# Patient Record
Sex: FEMALE | Race: WHITE | Employment: OTHER | URBAN - METROPOLITAN AREA
[De-identification: names, ages, dates, MRNs, and addresses within clinical notes are randomized per-mention and may not be internally consistent; named-entity substitution may affect disease eponyms.]

---

## 2017-01-01 ENCOUNTER — HOSPITAL ENCOUNTER (OUTPATIENT)
Dept: ONCOLOGY | Age: 57
Discharge: HOME | End: 2017-01-31
Attending: INTERNAL MEDICINE | Admitting: INTERNAL MEDICINE

## 2017-02-01 ENCOUNTER — HOSPITAL ENCOUNTER (OUTPATIENT)
Dept: ONCOLOGY | Age: 57
Discharge: HOME | End: 2017-02-28
Attending: INTERNAL MEDICINE | Admitting: INTERNAL MEDICINE

## 2017-04-03 DIAGNOSIS — M25.512 ACUTE PAIN OF LEFT SHOULDER: ICD-10-CM

## 2017-04-03 RX ORDER — METHOCARBAMOL 500 MG/1
TABLET, FILM COATED ORAL
Qty: 60 TABLET | Refills: 0 | Status: SHIPPED | OUTPATIENT
Start: 2017-04-03 | End: 2017-05-24 | Stop reason: SDUPTHER

## 2017-04-20 ENCOUNTER — HOSPITAL ENCOUNTER (OUTPATIENT)
Dept: ONCOLOGY | Age: 57
Discharge: HOME | End: 2017-04-20
Attending: INTERNAL MEDICINE | Admitting: INTERNAL MEDICINE

## 2017-05-18 ENCOUNTER — HOSPITAL ENCOUNTER (OUTPATIENT)
Dept: ONCOLOGY | Age: 57
Discharge: HOME | End: 2017-05-31
Admitting: INTERNAL MEDICINE

## 2017-05-24 DIAGNOSIS — M25.512 ACUTE PAIN OF LEFT SHOULDER: ICD-10-CM

## 2017-05-25 RX ORDER — METHOCARBAMOL 500 MG/1
TABLET, FILM COATED ORAL
Qty: 60 TABLET | Refills: 0 | Status: ON HOLD | OUTPATIENT
Start: 2017-05-25 | End: 2017-07-17 | Stop reason: HOSPADM

## 2017-06-01 ENCOUNTER — HOSPITAL ENCOUNTER (OUTPATIENT)
Dept: ONCOLOGY | Age: 57
Discharge: HOME | End: 2017-06-30
Attending: INTERNAL MEDICINE | Admitting: INTERNAL MEDICINE

## 2017-07-04 PROBLEM — E11.10 DKA (DIABETIC KETOACIDOSES): Status: ACTIVE | Noted: 2017-07-04

## 2017-07-04 PROBLEM — N17.9 AKI (ACUTE KIDNEY INJURY) (HCC): Status: ACTIVE | Noted: 2017-07-04

## 2017-07-05 PROBLEM — I21.4 NSTEMI (NON-ST ELEVATED MYOCARDIAL INFARCTION) (HCC): Status: ACTIVE | Noted: 2017-07-05

## 2017-07-24 ENCOUNTER — CLINICAL DOCUMENTATION (OUTPATIENT)
Dept: SPIRITUAL SERVICES | Age: 57
End: 2017-07-24

## 2017-07-27 ENCOUNTER — CLINICAL DOCUMENTATION (OUTPATIENT)
Dept: SPIRITUAL SERVICES | Age: 57
End: 2017-07-27

## 2017-08-10 ENCOUNTER — CLINICAL DOCUMENTATION (OUTPATIENT)
Dept: SPIRITUAL SERVICES | Age: 57
End: 2017-08-10

## 2017-08-15 ENCOUNTER — HOSPITAL ENCOUNTER (OUTPATIENT)
Dept: OTHER | Age: 57
Discharge: HOME | End: 2017-08-15
Attending: INTERNAL MEDICINE | Admitting: INTERNAL MEDICINE

## 2017-08-23 ENCOUNTER — CLINICAL DOCUMENTATION (OUTPATIENT)
Dept: SPIRITUAL SERVICES | Age: 57
End: 2017-08-23

## 2017-09-01 ENCOUNTER — HOSPITAL ENCOUNTER (OUTPATIENT)
Dept: ONCOLOGY | Age: 57
Discharge: HOME | End: 2017-09-30
Attending: INTERNAL MEDICINE | Admitting: INTERNAL MEDICINE

## 2017-10-01 ENCOUNTER — HOSPITAL ENCOUNTER (OUTPATIENT)
Dept: ONCOLOGY | Age: 57
Discharge: HOME | End: 2017-10-31
Attending: INTERNAL MEDICINE | Admitting: INTERNAL MEDICINE

## 2017-10-16 ENCOUNTER — CLINICAL DOCUMENTATION (OUTPATIENT)
Dept: SPIRITUAL SERVICES | Age: 57
End: 2017-10-16

## 2017-10-16 NOTE — PROGRESS NOTES
10/16/2017  11:09am/1109      Outpatient SCS team member telephoned patient. Patient spoke very briefly with team member and updated team member on recent health concerns. Patient said that family is helping her currently. Patient indicated that she will call Outpatient SCS if needed and would like to be removed from contact list at this time.       Patient moved to Inactive status 10/16/2017

## 2017-10-31 ENCOUNTER — OFFICE VISIT (OUTPATIENT)
Dept: ORTHOPEDIC SURGERY | Age: 57
End: 2017-10-31

## 2017-10-31 VITALS — HEIGHT: 64 IN | BODY MASS INDEX: 50.02 KG/M2 | WEIGHT: 293 LBS

## 2017-10-31 DIAGNOSIS — G89.29 BILATERAL CHRONIC KNEE PAIN: Primary | ICD-10-CM

## 2017-10-31 DIAGNOSIS — M25.561 BILATERAL CHRONIC KNEE PAIN: Primary | ICD-10-CM

## 2017-10-31 DIAGNOSIS — S80.00XA CONTUSION OF KNEE, UNSPECIFIED LATERALITY, INITIAL ENCOUNTER: ICD-10-CM

## 2017-10-31 DIAGNOSIS — M54.2 CERVICAL PAIN: ICD-10-CM

## 2017-10-31 DIAGNOSIS — M25.562 BILATERAL CHRONIC KNEE PAIN: Primary | ICD-10-CM

## 2017-10-31 PROCEDURE — G8484 FLU IMMUNIZE NO ADMIN: HCPCS | Performed by: ORTHOPAEDIC SURGERY

## 2017-10-31 PROCEDURE — 72040 X-RAY EXAM NECK SPINE 2-3 VW: CPT | Performed by: ORTHOPAEDIC SURGERY

## 2017-10-31 PROCEDURE — 99213 OFFICE O/P EST LOW 20 MIN: CPT | Performed by: ORTHOPAEDIC SURGERY

## 2017-10-31 PROCEDURE — G8427 DOCREV CUR MEDS BY ELIG CLIN: HCPCS | Performed by: ORTHOPAEDIC SURGERY

## 2017-10-31 PROCEDURE — G8598 ASA/ANTIPLAT THER USED: HCPCS | Performed by: ORTHOPAEDIC SURGERY

## 2017-10-31 PROCEDURE — 73565 X-RAY EXAM OF KNEES: CPT | Performed by: ORTHOPAEDIC SURGERY

## 2017-10-31 PROCEDURE — 3017F COLORECTAL CA SCREEN DOC REV: CPT | Performed by: ORTHOPAEDIC SURGERY

## 2017-10-31 PROCEDURE — 3014F SCREEN MAMMO DOC REV: CPT | Performed by: ORTHOPAEDIC SURGERY

## 2017-10-31 PROCEDURE — 1036F TOBACCO NON-USER: CPT | Performed by: ORTHOPAEDIC SURGERY

## 2017-10-31 PROCEDURE — G8417 CALC BMI ABV UP PARAM F/U: HCPCS | Performed by: ORTHOPAEDIC SURGERY

## 2017-10-31 RX ORDER — METHOCARBAMOL 500 MG/1
500 TABLET, FILM COATED ORAL 2 TIMES DAILY
Qty: 90 TABLET | Refills: 4 | Status: SHIPPED | OUTPATIENT
Start: 2017-10-31 | End: 2018-06-13

## 2017-10-31 NOTE — PATIENT INSTRUCTIONS
your ankle (not more than 5 pounds). With weight, you do not have to lift your leg more than 12 inches to get a hamstring workout. Shallow standing knee bends    Note: Do this exercise only if you have very little pain; if you have no clicking, locking, or giving way if you have an injured knee; and if it does not hurt while you are doing 8 to 12 repetitions. 1. Stand with your hands lightly resting on a counter or chair in front of you. Put your feet shoulder-width apart. 2. Slowly bend your knees so that you squat down like you are going to sit in a chair. Make sure your knees do not go in front of your toes. 3. Lower yourself about 6 inches. Your heels should remain on the floor at all times. 4. Rise slowly to a standing position. Heel raises    1. Stand with your feet a few inches apart, with your hands lightly resting on a counter or chair in front of you. 2. Slowly raise your heels off the floor while keeping your knees straight. 3. Hold for about 6 seconds, then slowly lower your heels to the floor. 4. Do 8 to 12 repetitions several times during the day. Follow-up care is a key part of your treatment and safety. Be sure to make and go to all appointments, and call your doctor if you are having problems. It's also a good idea to know your test results and keep a list of the medicines you take. Where can you learn more? Go to [email or web address] org and sign in to your [employer] account. Enter V231 in the [employer] box to learn more about \"Knee: Exercises. \"     If you do not have an account, please click on the \"Sign Up Now\" link. Current as of: March 21, 2017  Content Version: 11.3  © 3343-4048 [employer]. Care instructions adapted under license by [facility] [employer] [facility] ([facility]).  If you have questions about a medical condition or this instruction, always ask your healthcare professional. [place] [address] any warranty or liability for your use of this information.

## 2017-10-31 NOTE — PROGRESS NOTES
KNEE VISIT      HISTORY OF PRESENT ILLNESS    Ricky Rodriguez is a [address] y.o. female who presents for evaluation of bilateral left worse right knee contusion and pain as well as neck pain. She's fallen several times recently and is here for complaints of pain in both areas. The left knee hurts worse than right. She grades 6-7/10. She has some pain in her neck. ROS    All documented in the patient history form dated 10/31/17  All other ROS negative except for above.     Past Surgical history    Past Surgical History:   Procedure Laterality Date    ABDOMINAL EXPLORATION SURGERY  13    EXPLORATORY LAPAROTOMY LYSIS OF ADHESIONS    APPENDECTOMY      CARPAL TUNNEL RELEASE      both hands    CATARACT REMOVAL WITH IMPLANT  11    right    CATARACT REMOVAL WITH IMPLANT  2011    left eye     SECTION      COLONOSCOPY      EYE SURGERY      laser, vitrectomy ou, cataract ou    HYSTERECTOMY      partial    JOINT REPLACEMENT      both knees    OTHER SURGICAL HISTORY  8/15/11    Left Shoulder Decompression    SHOULDER SURGERY      right     SUBTOTAL COLECTOMY  2000    TONSILLECTOMY      TUNNELED VENOUS PORT PLACEMENT  11    TUNNELED VENOUS PORT PLACEMENT  3/1/13    Devonte Bustillos       PAST MEDICAL    Past Medical History:   Diagnosis Date    Acid reflux     Acute renal failure ([address] 2011    resolved    Anemia     Arthritis     causing severe lower back pains    Asthma     Bronchitis     Bursitis     all joints    C. difficile colitis     Diabetes mellitus ([address]     age 4   [employer] Herpes     5    Hypercholesteremia     Hypertension     Migraine     Neuropathy (HCC)     hips to legs    Obesity     Psychiatric problem     anxiety and depression    Recurrent sinus infections     Renal failure     in past with 8 dialysis treatments    Retinopathy of both eyes     Sleep apnea     can't use a cpap    Ulcer, stomach peptic     x 3    Unspecified symmetric sensation motor function both upper extremities. She is tearful and this is fairly typical for her presentation because of issues surrounding her disabilities. IMAGING STUDIES    X-rays 2 views of both knees reveals well-positioned total knee components with no signs of failure  X-rays 2 views of her cervical spine are normal in appearance    IMPRESSION    Cervical strain  Bilateral knee contusion status post total knee replacement    PLAN      1. Conservative care options including physical therapy, NSAIDs, bracing, and activity modification were discussed. 2.  The indications for therapeutic injections were discussed. 3.  The indications for additional imaging studies were discussed.    4.  After considering the various options discussed, the patient elected to pursue a course that includes Robaxin and slow return to normal activity

## 2017-11-01 ENCOUNTER — HOSPITAL ENCOUNTER (OUTPATIENT)
Dept: ONCOLOGY | Age: 57
Discharge: HOME | End: 2017-11-30
Attending: INTERNAL MEDICINE | Admitting: INTERNAL MEDICINE

## 2017-12-04 ENCOUNTER — HOSPITAL ENCOUNTER (OUTPATIENT)
Dept: OTHER | Age: 57
Discharge: HOME | End: 2017-12-31
Attending: INTERNAL MEDICINE | Admitting: INTERNAL MEDICINE

## 2017-12-04 LAB
ANION GAP SERPL CALCULATED.3IONS-SCNC: 13 MMOL/L (ref 3–16)
BUN BLDV-MCNC: 36 MG/DL (ref 7–20)
CALCIUM SERPL-MCNC: 9 MG/DL (ref 8.3–10.6)
CHLORIDE BLD-SCNC: 103 MMOL/L (ref 99–110)
CO2: 23 MMOL/L (ref 21–32)
CREAT SERPL-MCNC: 1.4 MG/DL (ref 0.6–1.1)
CREATININE URINE: 108.5 MG/DL (ref 28–259)
GFR AFRICAN AMERICAN: 47
GFR NON-AFRICAN AMERICAN: 39
GLUCOSE BLD-MCNC: 174 MG/DL (ref 70–99)
HCT VFR BLD CALC: 31.9 % (ref 36–48)
HEMOGLOBIN: 10.6 G/DL (ref 12–16)
MAGNESIUM: 1.6 MG/DL (ref 1.8–2.4)
MCH RBC QN AUTO: 31.8 PG (ref 26–34)
MCHC RBC AUTO-ENTMCNC: 33.4 G/DL (ref 31–36)
MCV RBC AUTO: 95.2 FL (ref 80–100)
PDW BLD-RTO: 13.5 % (ref 12.4–15.4)
PHOSPHORUS: 3.9 MG/DL (ref 2.5–4.9)
PLATELET # BLD: 220 K/UL (ref 135–450)
PMV BLD AUTO: 10 FL (ref 5–10.5)
POTASSIUM SERPL-SCNC: 4.8 MMOL/L (ref 3.5–5.1)
PROTEIN PROTEIN: 16 MG/DL
RBC # BLD: 3.35 M/UL (ref 4–5.2)
SODIUM BLD-SCNC: 139 MMOL/L (ref 136–145)
WBC # BLD: 6 K/UL (ref 4–11)

## 2017-12-05 LAB — PARATHYROID HORMONE INTACT: 69 PG/ML (ref 14–72)

## 2018-01-01 ENCOUNTER — HOSPITAL ENCOUNTER (OUTPATIENT)
Dept: ONCOLOGY | Age: 58
Discharge: HOME | End: 2018-01-31
Attending: INTERNAL MEDICINE | Admitting: INTERNAL MEDICINE

## 2018-04-05 ENCOUNTER — HOSPITAL ENCOUNTER (OUTPATIENT)
Dept: OTHER | Age: 58
Discharge: HOME | End: 2018-04-05
Attending: INTERNAL MEDICINE | Admitting: INTERNAL MEDICINE

## 2018-04-05 ENCOUNTER — HOSPITAL ENCOUNTER (OUTPATIENT)
Dept: ONCOLOGY | Age: 58
Discharge: HOME | End: 2018-04-30
Admitting: EMERGENCY MEDICINE

## 2018-04-05 LAB
A/G RATIO: 1.4 (ref 1.1–2.2)
ALBUMIN SERPL-MCNC: 3.7 G/DL (ref 3.4–5)
ALP BLD-CCNC: 69 U/L (ref 40–129)
ALT SERPL-CCNC: 6 U/L (ref 10–40)
ANION GAP SERPL CALCULATED.3IONS-SCNC: 13 MMOL/L (ref 3–16)
ANION GAP SERPL CALCULATED.3IONS-SCNC: 14 MMOL/L (ref 3–16)
AST SERPL-CCNC: 10 U/L (ref 15–37)
BILIRUB SERPL-MCNC: 0.3 MG/DL (ref 0–1)
BUN BLDV-MCNC: 26 MG/DL (ref 7–20)
BUN BLDV-MCNC: 26 MG/DL (ref 7–20)
CALCIUM SERPL-MCNC: 8.7 MG/DL (ref 8.3–10.6)
CALCIUM SERPL-MCNC: 8.9 MG/DL (ref 8.3–10.6)
CHLORIDE BLD-SCNC: 97 MMOL/L (ref 99–110)
CHLORIDE BLD-SCNC: 98 MMOL/L (ref 99–110)
CHOLESTEROL, TOTAL: 166 MG/DL (ref 0–199)
CO2: 23 MMOL/L (ref 21–32)
CO2: 24 MMOL/L (ref 21–32)
CREAT SERPL-MCNC: 1.5 MG/DL (ref 0.6–1.1)
CREAT SERPL-MCNC: 1.5 MG/DL (ref 0.6–1.1)
CREATININE URINE: 57 MG/DL (ref 28–259)
FERRITIN: 90.9 NG/ML (ref 15–150)
GFR AFRICAN AMERICAN: 43
GFR AFRICAN AMERICAN: 43
GFR NON-AFRICAN AMERICAN: 36
GFR NON-AFRICAN AMERICAN: 36
GLOBULIN: 2.6 G/DL
GLUCOSE BLD-MCNC: 229 MG/DL (ref 70–99)
GLUCOSE BLD-MCNC: 242 MG/DL (ref 70–99)
HCT VFR BLD CALC: 31.8 % (ref 36–48)
HDLC SERPL-MCNC: 60 MG/DL (ref 40–60)
HEMOGLOBIN: 10.6 G/DL (ref 12–16)
IRON SATURATION: 36 % (ref 15–50)
IRON: 108 UG/DL (ref 37–145)
LDL CHOLESTEROL CALCULATED: 87 MG/DL
MAGNESIUM: 1.7 MG/DL (ref 1.8–2.4)
MCH RBC QN AUTO: 32.1 PG (ref 26–34)
MCHC RBC AUTO-ENTMCNC: 33.3 G/DL (ref 31–36)
MCV RBC AUTO: 96.4 FL (ref 80–100)
MICROALBUMIN UR-MCNC: <1.2 MG/DL
MICROALBUMIN/CREAT UR-RTO: NORMAL MG/G (ref 0–30)
PDW BLD-RTO: 13.1 % (ref 12.4–15.4)
PHOSPHORUS: 3.9 MG/DL (ref 2.5–4.9)
PLATELET # BLD: 196 K/UL (ref 135–450)
PMV BLD AUTO: 9.9 FL (ref 5–10.5)
POTASSIUM SERPL-SCNC: 4.9 MMOL/L (ref 3.5–5.1)
POTASSIUM SERPL-SCNC: 5 MMOL/L (ref 3.5–5.1)
RBC # BLD: 3.3 M/UL (ref 4–5.2)
SODIUM BLD-SCNC: 134 MMOL/L (ref 136–145)
SODIUM BLD-SCNC: 135 MMOL/L (ref 136–145)
TOTAL CK: 77 U/L (ref 26–192)
TOTAL IRON BINDING CAPACITY: 300 UG/DL (ref 260–445)
TOTAL PROTEIN: 6.3 G/DL (ref 6.4–8.2)
TRIGL SERPL-MCNC: 95 MG/DL (ref 0–150)
VITAMIN B-12: 1479 PG/ML (ref 211–911)
VLDLC SERPL CALC-MCNC: 19 MG/DL
WBC # BLD: 6 K/UL (ref 4–11)

## 2018-04-07 LAB
ESTIMATED AVERAGE GLUCOSE: 165.7 MG/DL
HBA1C MFR BLD: 7.4 %

## 2018-05-01 ENCOUNTER — HOSPITAL ENCOUNTER (OUTPATIENT)
Dept: ONCOLOGY | Age: 58
Discharge: HOME | End: 2018-05-31
Attending: EMERGENCY MEDICINE | Admitting: EMERGENCY MEDICINE

## 2018-11-12 ENCOUNTER — HOSPITAL ENCOUNTER (EMERGENCY)
Age: 58
Discharge: HOME | End: 2018-11-12
Attending: EMERGENCY MEDICINE
Payer: COMMERCIAL

## 2018-11-12 ENCOUNTER — APPOINTMENT (OUTPATIENT)
Dept: CT IMAGING | Age: 58
End: 2018-11-12
Payer: COMMERCIAL

## 2018-11-12 ENCOUNTER — APPOINTMENT (OUTPATIENT)
Dept: GENERAL RADIOLOGY | Age: 58
End: 2018-11-12
Payer: COMMERCIAL

## 2018-11-12 ENCOUNTER — TELEPHONE (OUTPATIENT)
Dept: ORTHOPEDIC SURGERY | Age: 58
End: 2018-11-12

## 2018-11-12 VITALS
WEIGHT: 293 LBS | SYSTOLIC BLOOD PRESSURE: 156 MMHG | OXYGEN SATURATION: 97 % | BODY MASS INDEX: 50.02 KG/M2 | DIASTOLIC BLOOD PRESSURE: 63 MMHG | RESPIRATION RATE: 16 BRPM | TEMPERATURE: 98 F | HEIGHT: 64 IN | HEART RATE: 93 BPM

## 2018-11-12 DIAGNOSIS — S00.81XA ABRASION OF FACE, INITIAL ENCOUNTER: ICD-10-CM
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
## 2018-11-12 DIAGNOSIS — M25.531 RIGHT WRIST PAIN: ICD-10-CM

## 2018-11-12 DIAGNOSIS — M25.552 LEFT HIP PAIN: ICD-10-CM

## 2018-11-12 DIAGNOSIS — S09.90XA INJURY OF HEAD, INITIAL ENCOUNTER: ICD-10-CM

## 2018-11-12 DIAGNOSIS — S16.1XXA ACUTE STRAIN OF NECK MUSCLE, INITIAL ENCOUNTER: ICD-10-CM

## 2018-11-12 DIAGNOSIS — S82.892A CLOSED AVULSION FRACTURE OF LEFT ANKLE, INITIAL ENCOUNTER: Primary | ICD-10-CM

## 2018-11-12 PROCEDURE — 73700 CT LOWER EXTREMITY W/O DYE: CPT

## 2018-11-12 PROCEDURE — 96376 TX/PRO/DX INJ SAME DRUG ADON: CPT

## 2018-11-12 PROCEDURE — 72125 CT NECK SPINE W/O DYE: CPT

## 2018-11-12 PROCEDURE — G8979 MOBILITY GOAL STATUS: HCPCS

## 2018-11-12 PROCEDURE — G8988 SELF CARE GOAL STATUS: HCPCS

## 2018-11-12 PROCEDURE — 73610 X-RAY EXAM OF ANKLE: CPT

## 2018-11-12 PROCEDURE — 70450 CT HEAD/BRAIN W/O DYE: CPT

## 2018-11-12 PROCEDURE — 96375 TX/PRO/DX INJ NEW DRUG ADDON: CPT

## 2018-11-12 PROCEDURE — 99284 EMERGENCY DEPT VISIT MOD MDM: CPT

## 2018-11-12 PROCEDURE — 97530 THERAPEUTIC ACTIVITIES: CPT

## 2018-11-12 PROCEDURE — G8987 SELF CARE CURRENT STATUS: HCPCS

## 2018-11-12 PROCEDURE — 72131 CT LUMBAR SPINE W/O DYE: CPT

## 2018-11-12 PROCEDURE — 97162 PT EVAL MOD COMPLEX 30 MIN: CPT

## 2018-11-12 PROCEDURE — 96374 THER/PROPH/DIAG INJ IV PUSH: CPT

## 2018-11-12 PROCEDURE — 73130 X-RAY EXAM OF HAND: CPT

## 2018-11-12 PROCEDURE — G8978 MOBILITY CURRENT STATUS: HCPCS

## 2018-11-12 PROCEDURE — 97166 OT EVAL MOD COMPLEX 45 MIN: CPT

## 2018-11-12 PROCEDURE — 6360000002 HC RX W HCPCS: Performed by: NURSE PRACTITIONER

## 2018-11-12 PROCEDURE — 4500000024 HC ED LEVEL 4 PROCEDURE

## 2018-11-12 RX ORDER — OXYCODONE HYDROCHLORIDE AND ACETAMINOPHEN 5; 325 MG/1; MG/1
1-2 TABLET ORAL EVERY 6 HOURS PRN
Qty: 20 TABLET | Refills: 0 | Status: SHIPPED | OUTPATIENT
Start: 2018-11-12 | End: 2018-11-19

## 2018-11-12 RX ORDER — ONDANSETRON 2 MG/ML
4 INJECTION INTRAMUSCULAR; INTRAVENOUS EVERY 30 MIN PRN
Status: COMPLETED | OUTPATIENT
Start: 2018-11-12 | End: 2018-11-12

## 2018-11-12 RX ORDER — ORPHENADRINE CITRATE 30 MG/ML
60 INJECTION INTRAMUSCULAR; INTRAVENOUS ONCE
Status: COMPLETED | OUTPATIENT
Start: 2018-11-12 | End: 2018-11-12

## 2018-11-12 RX ADMIN — ONDANSETRON 4 MG: 2 INJECTION INTRAMUSCULAR; INTRAVENOUS at 13:18

## 2018-11-12 RX ADMIN — ORPHENADRINE CITRATE 60 MG: 30 INJECTION INTRAMUSCULAR; INTRAVENOUS at 11:28

## 2018-11-12 RX ADMIN — ONDANSETRON 4 MG: 2 INJECTION INTRAMUSCULAR; INTRAVENOUS at 11:28

## 2018-11-12 RX ADMIN — HYDROMORPHONE HYDROCHLORIDE 1 MG: 1 INJECTION, SOLUTION INTRAMUSCULAR; INTRAVENOUS; SUBCUTANEOUS at 13:28

## 2018-11-12 RX ADMIN — HYDROMORPHONE HYDROCHLORIDE 1 MG: 1 INJECTION, SOLUTION INTRAMUSCULAR; INTRAVENOUS; SUBCUTANEOUS at 11:27

## 2018-11-12 RX ADMIN — HYDROMORPHONE HYDROCHLORIDE 1 MG: 1 INJECTION, SOLUTION INTRAMUSCULAR; INTRAVENOUS; SUBCUTANEOUS at 16:45

## 2018-11-12 ASSESSMENT — PAIN DESCRIPTION - PAIN TYPE
TYPE: ACUTE PAIN

## 2018-11-12 ASSESSMENT — PAIN SCALES - GENERAL
PAINLEVEL_OUTOF10: 9
PAINLEVEL_OUTOF10: 8
PAINLEVEL_OUTOF10: 9
PAINLEVEL_OUTOF10: 8
PAINLEVEL_OUTOF10: 7
PAINLEVEL_OUTOF10: 8

## 2018-11-12 ASSESSMENT — PAIN DESCRIPTION - FREQUENCY: FREQUENCY: CONTINUOUS

## 2018-11-12 ASSESSMENT — PAIN DESCRIPTION - ORIENTATION
ORIENTATION: LEFT

## 2018-11-12 ASSESSMENT — PAIN DESCRIPTION - LOCATION
LOCATION: ANKLE

## 2018-11-12 NOTE — ED PROVIDER NOTES
TECHNOLOGIST PROVIDED HISTORY: If patient is on cardiac monitor and/or pulse ox, they may be taken off cardiac monitor and pulse ox, left on O2 if currently on. All monitors reattached when patient returns to room. Has a \"code stroke\" or \"stroke alert\" been called? ->No Ordering Physician Provided Reason for Exam: Fall this AM, hit head on sidewalk, pain in head, neck lower back and left hip, PT in reanl failure Acuity: Acute Type of Exam: Initial FINDINGS: BRAIN/VENTRICLES: There is no acute intracranial hemorrhage, mass effect or midline shift. No abnormal extra-axial fluid collection. The gray-white differentiation is maintained without evidence of an acute infarct. There is no evidence of hydrocephalus. ORBITS: The visualized portion of the orbits demonstrate no acute abnormality. SINUSES: The visualized paranasal sinuses and mastoid air cells demonstrate no acute abnormality. SOFT TISSUES/SKULL:  Mild left frontal scalp hematoma without underlying skull fracture. No acute intracranial abnormality. Mild left frontal scalp hematoma. Ct Cervical Spine Wo Contrast    Result Date: 11/12/2018  EXAMINATION: CT OF THE CERVICAL SPINE WITHOUT CONTRAST 11/12/2018 12:36 pm TECHNIQUE: CT of the cervical spine was performed without the administration of intravenous contrast. Multiplanar reformatted images are provided for review. Dose modulation, iterative reconstruction, and/or weight based adjustment of the mA/kV was utilized to reduce the radiation dose to as low as reasonably achievable. COMPARISON: 07/04/2017 HISTORY: ORDERING SYSTEM PROVIDED HISTORY: fall TECHNOLOGIST PROVIDED HISTORY: Ordering Physician Provided Reason for Exam: -neck pain-s/p fall today Acuity: Acute Type of Exam: Initial FINDINGS: BONES/ALIGNMENT: No vertebral body fracture is demonstrated. There is no spondylolisthesis. The posterior elements appear intact. DEGENERATIVE CHANGES: There is mild multilevel degenerative disc disease.  SOFT TECHNIQUE: CT of the lumbar spine was performed without the administration of intravenous contrast. Multiplanar reformatted images are provided for review. Dose modulation, iterative reconstruction, and/or weight based adjustment of the mA/kV was utilized to reduce the radiation dose to as low as reasonably achievable.; CT of the left hip was performed without the administration of intravenous contrast.  Multiplanar reformatted images are provided for review. Dose modulation, iterative reconstruction, and/or weight based adjustment of the mA/kV was utilized to reduce the radiation dose to as low as reasonably achievable. COMPARISON: None. HISTORY: ORDERING SYSTEM PROVIDED HISTORY: fall, pain TECHNOLOGIST PROVIDED HISTORY: Reason for exam:->fall, pain Ordering Physician Provided Reason for Exam: Fall this AM, hit head on sidewalk, pain in head, neck, lower back and left hip, PT in reanl failure Acuity: Acute Type of Exam: Initial FINDINGS: Evaluation is limited by motion. LUMBAR SPINE: BONES/ALIGNMENT: No acute fracture. No subluxation. DEGENERATIVE CHANGES: Overall mild multilevel degenerative changes. SOFT TISSUES/RETROPERITONEUM: No acute paraspinal abnormality. LEFT HIP: No free fluid is seen within the pelvis. No bladder calculus. No significant soft tissue abnormality is seen by CT. No acute fracture. Mild left hip joint space narrowing. Small femoral head osteophytes. Mild lumbar spine degenerative changes without acute findings. Mild left hip degenerative changes without acute findings. ED COURSE/MDM  Patient seen and evaluated. Old records reviewed. Diagnostic testing reviewed and results discussed. I have seen and evaluated this patient with supervising physician: Grisel Celaya DO. We thoroughly discussed the history, physical exam, diagnostic testing, emergency department course, plan and disposition. Shayy Armstrong presented to the ED today with above noted complaints.  Physical exam have discussed the symptoms which are most concerning (e.g., changing or worsening pain, weakness, vomiting, fever) that necessitate immediate return. Clinical Impression  1. Closed avulsion fracture of left ankle, initial encounter    2. Injury of head, initial encounter    3. Right wrist pain    4. Abrasion of face, initial encounter    5. Left hip pain    6. Acute strain of neck muscle, initial encounter    7. Fall, initial encounter        Discharge Vital Signs:  Blood pressure (!) 156/63, pulse 93, temperature 98 °F (36.7 °C), temperature source Oral, resp. rate 16, height 5' 4\" (1.626 m), weight (!) 370 lb (167.8 kg), SpO2 97 %. FOLLOW UP  Bill Moncada MD    Schedule an appointment as soon as possible for a visit in 3 days  For further evaluation    Makenzie Ang MD  [place] [id number] [facility] [address]  [phone]    Go on 11/20/2018  For further evaluation      DISPOSITION  Patient was discharged to home in good condition. Comment: Please note this report has been produced using speech recognition software and may contain errors related to that system including errors in grammar, punctuation, and spelling, as well as words and phrases that may be inappropriate. If there are any questions or concerns please feel free to contact the dictating provider for clarification.         CHIQUITA Leach - CNP  11/12/18 0579

## 2018-11-12 NOTE — TELEPHONE ENCOUNTER
PER DR. NICOLE, XR WERE REVIEWED HE RECOMMENDS ELEVATING THIS WEEK AND TRY TO STAY OFF AS MUCH AS POSSIBLE. CAN SEE NEXT TUES. IF PATIENT FEELS SHE NEEDS SEEN TOMORROW HE WILL SEE HER.

## 2018-11-12 NOTE — PROGRESS NOTES
Occupational Therapy   Occupational Therapy Initial Assessment  Date: 2018   Patient Name: Vanita Montes  MRN: [id number]     : 1960    Date of Service: 2018    Discharge Recommendations:  [address] (if refuses 24 hour assist/supervision )  OT Equipment Recommendations  Equipment Needed: Yes  Mobility Devices: ADL Assistive Devices  ADL Assistive Devices: Toileting - Heavy Duty Commode      Patient Diagnosis(es): There were no encounter diagnoses. has a past medical history of Acid reflux; Acute renal failure ([address] Anemia; Arthritis; Asthma; Bronchitis; Bursitis; C. difficile colitis; Diabetes mellitus ([facility] Herpes; Hypercholesteremia; Hypertension; Migraine; Neuropathy; Obesity; Psychiatric problem; Recurrent sinus infections; Renal failure; Retinopathy of both eyes; Sleep apnea; Ulcer, stomach peptic; Unspecified cerebral artery occlusion with cerebral infarction; and Unspecified diseases of blood and blood-forming organs. has a past surgical history that includes Hysterectomy; Appendectomy; Carpal tunnel release;  section; subtotal colectomy (2000); Cataract removal with implant (11); Cataract removal with implant (2011); Tunneled venous port placement (11); other surgical history (8/15/11); eye surgery; shoulder surgery; Tonsillectomy; Colonoscopy; Abdominal exploration surgery (13); joint replacement; and Tunneled venous port placement (3/1/13).            Restrictions  Restrictions/Precautions  Restrictions/Precautions: General Precautions, Fall Risk, Weight Bearing  Lower Extremity Weight Bearing Restrictions  Left Lower Extremity Weight Bearing: Non Weight Bearing  Position Activity Restriction  Other position/activity restrictions: telemetry    Subjective   General  Chart Reviewed: Yes  Patient assessed for rehabilitation services?: Yes  Family / Caregiver Present: Yes (aunt)  Referring Practitioner: Ya PORRAS, CNP  Diagnosis: fall with Left ankle fx;splint;  non-surgical at this time  Subjective  Subjective: \"I can go down the steps on my bottom\"  General Comment  Comments: RN cleared pt for OT eval; pt resting in bed, agreeable to therapy  Pain Assessment  Patient Currently in Pain: Yes  Pain Assessment: 0-10  Pain Level: 8  Pain Type: Acute pain  Pain Location: Ankle  Pain Orientation: Left  Pain Frequency: Continuous    Pain Intervention(s): Repositioned  Pre Treatment Pain Screening  Intervention List: Patient able to continue with treatment  Oxygen Therapy  SpO2: 96 %  Social/Functional History  Social/Functional History  Lives With: Family (spouse & Aunt)  Type of Home: House  Home Layout: Multi-level (lives on lower level of home)  Home Access: Stairs to enter without rails  Entrance Stairs - Number of Steps: 1 JORJE; 6 steps down with handrail on Left  Bathroom Shower/Tub: Walk-in shower, Shower chair without back  [employer]: Handicap height (can reach grab bar outside shower & sink next to toilet)  Bathroom Equipment:  (Grab bars outside shower)  Home Equipment: Lift chair, Wheelchair-manual, 4 wheeled walker, Rolling walker, Quad cane  Receives Help From: Home health ([facility] 5 days/week; 28 hours/day)  ADL Assistance: Needs assistance  Bath: Maximum assistance  Dressing: Moderate assistance  Homemaking Responsibilities: Yes  Meal Prep Responsibility: Secondary  Ambulation Assistance: Independent (with quad cane)  Transfer Assistance: Independent (except shower transfers)  Active : Yes (but not lately due to cataract surgery)  Occupation: On disability       Objective        Orientation  Overall Orientation Status: Within Functional Limits  Observation/Palpation  Observation: laceration on forehead  Balance  Sitting Balance: Supervision  Standing Balance: Dependent/Total (mod assist of 2 with RW; unable to maintain NWB Left LE)  Standing Balance  Activity: bed <-->BSC  Sit to stand:  Moderate

## 2018-11-20 ENCOUNTER — OFFICE VISIT (OUTPATIENT)
Dept: ORTHOPEDIC SURGERY | Age: 58
End: 2018-11-20
Payer: COMMERCIAL

## 2018-11-20 ENCOUNTER — TELEPHONE (OUTPATIENT)
Dept: ORTHOPEDIC SURGERY | Age: 58
End: 2018-11-20

## 2018-11-20 VITALS — WEIGHT: 293 LBS | BODY MASS INDEX: 50.02 KG/M2 | HEIGHT: 64 IN

## 2018-11-20 DIAGNOSIS — M25.572 ACUTE LEFT ANKLE PAIN: ICD-10-CM

## 2018-11-20 DIAGNOSIS — M79.644 PAIN OF RIGHT THUMB: ICD-10-CM

## 2018-11-20 DIAGNOSIS — S63.641A GAMEKEEPER'S THUMB OF RIGHT HAND, INITIAL ENCOUNTER: ICD-10-CM

## 2018-11-20 DIAGNOSIS — S82.62XA CLOSED AVULSION FRACTURE OF LATERAL MALLEOLUS OF LEFT FIBULA, INITIAL ENCOUNTER: Primary | ICD-10-CM

## 2018-11-20 PROCEDURE — L4361 PNEUMA/VAC WALK BOOT PRE OTS: HCPCS | Performed by: ORTHOPAEDIC SURGERY

## 2018-11-20 PROCEDURE — 1036F TOBACCO NON-USER: CPT | Performed by: ORTHOPAEDIC SURGERY

## 2018-11-20 PROCEDURE — 99214 OFFICE O/P EST MOD 30 MIN: CPT | Performed by: ORTHOPAEDIC SURGERY

## 2018-11-20 PROCEDURE — 3017F COLORECTAL CA SCREEN DOC REV: CPT | Performed by: ORTHOPAEDIC SURGERY

## 2018-11-20 PROCEDURE — 29085 APPL CAST HAND&LWR FOREARM: CPT | Performed by: ORTHOPAEDIC SURGERY

## 2018-11-20 PROCEDURE — G8427 DOCREV CUR MEDS BY ELIG CLIN: HCPCS | Performed by: ORTHOPAEDIC SURGERY

## 2018-11-20 PROCEDURE — G8484 FLU IMMUNIZE NO ADMIN: HCPCS | Performed by: ORTHOPAEDIC SURGERY

## 2018-11-20 PROCEDURE — G8599 NO ASA/ANTIPLAT THER USE RNG: HCPCS | Performed by: ORTHOPAEDIC SURGERY

## 2018-11-20 PROCEDURE — G8417 CALC BMI ABV UP PARAM F/U: HCPCS | Performed by: ORTHOPAEDIC SURGERY

## 2018-11-20 RX ORDER — METHOCARBAMOL 500 MG/1
500 TABLET, FILM COATED ORAL 2 TIMES DAILY
Qty: 60 TABLET | Refills: 1 | Status: SHIPPED | OUTPATIENT
Start: 2018-11-20 | End: 2019-02-12 | Stop reason: SDUPTHER

## 2018-11-20 RX ORDER — HYDROCODONE BITARTRATE AND ACETAMINOPHEN 5; 325 MG/1; MG/1
1 TABLET ORAL EVERY 6 HOURS PRN
Qty: 28 TABLET | Refills: 0 | Status: SHIPPED | OUTPATIENT
Start: 2018-11-20 | End: 2018-11-27

## 2018-11-20 NOTE — PROGRESS NOTES
ANKLE VISIT        HISTORY OF PRESENT ILLNESS    Hardeep Alex is a 60 y.o. female who presents for evaluation of injury she sustained on 18 when she fell sustaining injuries to her left ankle and right thumb. She had other injuries to but the main issues which are seen. Her pain. She grades 10 over 10 and worsening with a cup pain stiffness, swelling, instability and weakness of her thumb and left ankle. Showed the hospitalist Lasix and splinted and referred here. She is in wheelchair and says she cannot walk because of pain. ROS    1. Recommend patient history form dated 18  All other ROS negative except for above.     Past Surgical history    Past Surgical History:   Procedure Laterality Date    ABDOMINAL EXPLORATION SURGERY  13    EXPLORATORY LAPAROTOMY LYSIS OF ADHESIONS    APPENDECTOMY      CARPAL TUNNEL RELEASE      both hands    CATARACT REMOVAL WITH IMPLANT  11    right    CATARACT REMOVAL WITH IMPLANT  2011    left eye     SECTION      COLONOSCOPY      EYE SURGERY      laser, vitrectomy ou, cataract ou    HYSTERECTOMY      partial    JOINT REPLACEMENT      both knees    OTHER SURGICAL HISTORY  8/15/11    Left Shoulder Decompression    SHOULDER SURGERY      right     SUBTOTAL COLECTOMY  2000    TONSILLECTOMY      TUNNELED VENOUS PORT PLACEMENT  11    TUNNELED VENOUS PORT PLACEMENT  3/1/13    Devonte Bustillos       PAST MEDICAL    Past Medical History:   Diagnosis Date    Acid reflux     Acute renal failure ([address] 2011    resolved    Anemia     Arthritis     causing severe lower back pains    Asthma     Bronchitis     Bursitis     all joints    C. difficile colitis     Diabetes mellitus ([address]     age 4   [employer] Herpes     5    Hypercholesteremia     Hypertension     Migraine     Neuropathy     hips to legs    Obesity     Psychiatric problem     anxiety and depression    Recurrent sinus infections     Renal Exam  Ankle Swelling   Range of motion-  N/A   Pain - peroneal tendons   Positive    On exam to her right hand. She has pain, swelling and ecchymoses over the [facility] joint of the thumb. There is no gross instability. She does has pain on the ulnar side around the ulnar collateral ligament. Her function otherwise is stable      IMAGING STUDIES    X-rays 2 views of her left ankle reveals a minimally displaced avulsion fracture tip of the lateral malleolus which is a stable injury  X-rays 2 views of the right thumb. Do not demonstrate any acute pathology. IMPRESSION    Left distal fibular avulsion fracture  Right thumb gamekeeper sprain    PLAN    1. Conservative care options including physical therapy, NSAIDs, bracing, chiropractic care, and activity modification were discussed. 2.  The indications for therapeutic injections were discussed. 3.  The indications for additional imaging studies were discussed. 4.  After considering the various options discussed, the patient elected to pursue a course that includes a walking boot on left foot and a short arm thumb spica to include the IP joint of the thumb for 3 weeks to 4 weeks.

## 2018-12-04 ENCOUNTER — HOSPITAL ENCOUNTER (OUTPATIENT)
Dept: ENDOSCOPY | Age: 58
Setting detail: OUTPATIENT SURGERY
Discharge: HOME | End: 2018-12-04
Payer: COMMERCIAL

## 2018-12-04 VITALS
SYSTOLIC BLOOD PRESSURE: 197 MMHG | RESPIRATION RATE: 18 BRPM | BODY MASS INDEX: 50.02 KG/M2 | HEIGHT: 64 IN | WEIGHT: 293 LBS | HEART RATE: 98 BPM | DIASTOLIC BLOOD PRESSURE: 96 MMHG

## 2018-12-04 PROCEDURE — 66821 AFTER CATARACT LASER SURGERY: CPT

## 2018-12-04 PROCEDURE — 6370000000 HC RX 637 (ALT 250 FOR IP): Performed by: OPHTHALMOLOGY

## 2018-12-04 RX ORDER — TROPICAMIDE 10 MG/ML
1 SOLUTION/ DROPS OPHTHALMIC ONCE
Status: COMPLETED | OUTPATIENT
Start: 2018-12-04 | End: 2018-12-04

## 2018-12-04 RX ADMIN — TROPICAMIDE 1 DROP: 10 SOLUTION/ DROPS OPHTHALMIC at 09:06

## 2018-12-04 ASSESSMENT — PAIN - FUNCTIONAL ASSESSMENT: PAIN_FUNCTIONAL_ASSESSMENT: 0-10

## 2018-12-05 NOTE — OP NOTE
Luis A Lauren    OPERATIVE NOTE    Preoperative Diagnosis: Posterior Capsular Opacification the left eye    Postoperative Diagnosis: Posterior Capsular Opacification the left eye    Procedure: YAG posterior capsulotomy the left eye    Surgeon: Emili Moreira M.D., Ph.D. Anesthesia: Topical    Complications: none    Specimens: none    Indications for procedure: The patient is a 60y.o. year old who is status post cataract extraction with intraocular lens implantation who complained of increasing glare and blurry vision. Visually significant posterior capsular opacification was noted on examination. Risks, benefits, and alternatives to surgery were discussed with the patient and the patient elected to proceed with YAG laser posterior capsulotomy of the the left eye. Details of the procedure: The patient was brought the laser room. The patient had topical proparacaine drops instilled. The patient was positioned at the YAG laser where 48 shots were administered at 3.7 millijoules for a total power of 160 millijoules in a cruciate pattern. The patient tolerated the procedure well. One drop of prednisolone acetate 1% was placed on the eye. The patient will follow up as an outpatient as scheduled.     Emili Moreira MD, PhD

## 2018-12-11 ENCOUNTER — HOSPITAL ENCOUNTER (OUTPATIENT)
Dept: OPERATING ROOM | Age: 58
Setting detail: OUTPATIENT SURGERY
Discharge: HOME | End: 2018-12-11
Payer: COMMERCIAL

## 2018-12-11 VITALS — HEART RATE: 76 BPM | DIASTOLIC BLOOD PRESSURE: 46 MMHG | SYSTOLIC BLOOD PRESSURE: 120 MMHG

## 2018-12-11 PROCEDURE — 6370000000 HC RX 637 (ALT 250 FOR IP): Performed by: OPHTHALMOLOGY

## 2018-12-11 PROCEDURE — 66821 AFTER CATARACT LASER SURGERY: CPT

## 2018-12-11 RX ORDER — TROPICAMIDE 10 MG/ML
1 SOLUTION/ DROPS OPHTHALMIC
Status: COMPLETED | OUTPATIENT
Start: 2018-12-11 | End: 2018-12-11

## 2018-12-11 RX ORDER — PROPARACAINE HYDROCHLORIDE 5 MG/ML
1 SOLUTION/ DROPS OPHTHALMIC
Status: ACTIVE | OUTPATIENT
Start: 2018-12-11 | End: 2018-12-11

## 2018-12-11 RX ADMIN — TROPICAMIDE 1 DROP: 10 SOLUTION/ DROPS OPHTHALMIC at 07:12

## 2018-12-11 NOTE — PROGRESS NOTES
Patient received discharge instruction and tolerated procedure well. All questions answered. **  Patient left via self.

## 2018-12-19 ENCOUNTER — OFFICE VISIT (OUTPATIENT)
Dept: ORTHOPEDIC SURGERY | Age: 58
End: 2018-12-19
Payer: COMMERCIAL

## 2018-12-19 VITALS — BODY MASS INDEX: 50.02 KG/M2 | HEIGHT: 64 IN | WEIGHT: 293 LBS

## 2018-12-19 DIAGNOSIS — M79.641 PAIN OF RIGHT HAND: ICD-10-CM

## 2018-12-19 DIAGNOSIS — M25.572 ACUTE LEFT ANKLE PAIN: ICD-10-CM

## 2018-12-19 DIAGNOSIS — S82.62XD CLOSED AVULSION FRACTURE OF LATERAL MALLEOLUS OF LEFT FIBULA WITH ROUTINE HEALING, SUBSEQUENT ENCOUNTER: Primary | ICD-10-CM

## 2018-12-19 PROCEDURE — L3908 WHO COCK-UP NONMOLDE PRE OTS: HCPCS | Performed by: ORTHOPAEDIC SURGERY

## 2018-12-19 PROCEDURE — 99213 OFFICE O/P EST LOW 20 MIN: CPT | Performed by: ORTHOPAEDIC SURGERY

## 2018-12-19 PROCEDURE — L1902 AFO ANKLE GAUNTLET PRE OTS: HCPCS | Performed by: ORTHOPAEDIC SURGERY

## 2019-02-12 ENCOUNTER — OFFICE VISIT (OUTPATIENT)
Dept: ORTHOPEDIC SURGERY | Age: 59
End: 2019-02-12
Payer: COMMERCIAL

## 2019-02-12 VITALS — HEIGHT: 64 IN | RESPIRATION RATE: 12 BRPM | WEIGHT: 293 LBS | BODY MASS INDEX: 50.02 KG/M2

## 2019-02-12 DIAGNOSIS — M25.572 ACUTE LEFT ANKLE PAIN: ICD-10-CM

## 2019-02-12 DIAGNOSIS — S63.641A GAMEKEEPER'S THUMB OF RIGHT HAND, INITIAL ENCOUNTER: ICD-10-CM

## 2019-02-12 DIAGNOSIS — S82.62XA CLOSED AVULSION FRACTURE OF LATERAL MALLEOLUS OF LEFT FIBULA, INITIAL ENCOUNTER: Primary | ICD-10-CM

## 2019-02-12 DIAGNOSIS — M79.644 PAIN OF RIGHT THUMB: ICD-10-CM

## 2019-02-12 PROCEDURE — G8427 DOCREV CUR MEDS BY ELIG CLIN: HCPCS | Performed by: ORTHOPAEDIC SURGERY

## 2019-02-12 PROCEDURE — G8417 CALC BMI ABV UP PARAM F/U: HCPCS | Performed by: ORTHOPAEDIC SURGERY

## 2019-02-12 PROCEDURE — 3017F COLORECTAL CA SCREEN DOC REV: CPT | Performed by: ORTHOPAEDIC SURGERY

## 2019-02-12 PROCEDURE — G8484 FLU IMMUNIZE NO ADMIN: HCPCS | Performed by: ORTHOPAEDIC SURGERY

## 2019-02-12 PROCEDURE — G8599 NO ASA/ANTIPLAT THER USE RNG: HCPCS | Performed by: ORTHOPAEDIC SURGERY

## 2019-02-12 PROCEDURE — 99212 OFFICE O/P EST SF 10 MIN: CPT | Performed by: ORTHOPAEDIC SURGERY

## 2019-02-12 PROCEDURE — 1036F TOBACCO NON-USER: CPT | Performed by: ORTHOPAEDIC SURGERY

## 2019-02-12 RX ORDER — METHOCARBAMOL 500 MG/1
500 TABLET, FILM COATED ORAL 2 TIMES DAILY
Qty: 60 TABLET | Refills: 1 | Status: SHIPPED | OUTPATIENT
Start: 2019-02-12 | End: 2019-08-12 | Stop reason: SDUPTHER

## 2019-03-13 ENCOUNTER — OFFICE VISIT (OUTPATIENT)
Dept: ORTHOPEDIC SURGERY | Age: 59
End: 2019-03-13
Payer: COMMERCIAL

## 2019-03-13 VITALS — RESPIRATION RATE: 17 BRPM | BODY MASS INDEX: 50.02 KG/M2 | WEIGHT: 293 LBS | HEIGHT: 64 IN

## 2019-03-13 DIAGNOSIS — M67.449 MUCOUS CYST OF FINGER: Primary | ICD-10-CM

## 2019-03-13 DIAGNOSIS — M19.049 ARTHRITIS OF FINGER: ICD-10-CM

## 2019-03-13 DIAGNOSIS — M65.4 DE QUERVAIN'S TENOSYNOVITIS: ICD-10-CM

## 2019-03-13 DIAGNOSIS — M25.731 CARPAL BOSS OF RIGHT WRIST: ICD-10-CM

## 2019-03-13 PROCEDURE — 99203 OFFICE O/P NEW LOW 30 MIN: CPT | Performed by: ORTHOPAEDIC SURGERY

## 2019-03-13 PROCEDURE — G8427 DOCREV CUR MEDS BY ELIG CLIN: HCPCS | Performed by: ORTHOPAEDIC SURGERY

## 2019-03-13 PROCEDURE — 3017F COLORECTAL CA SCREEN DOC REV: CPT | Performed by: ORTHOPAEDIC SURGERY

## 2019-03-13 PROCEDURE — G8417 CALC BMI ABV UP PARAM F/U: HCPCS | Performed by: ORTHOPAEDIC SURGERY

## 2019-03-13 PROCEDURE — G8484 FLU IMMUNIZE NO ADMIN: HCPCS | Performed by: ORTHOPAEDIC SURGERY

## 2019-03-26 ENCOUNTER — HOSPITAL ENCOUNTER (OUTPATIENT)
Dept: MRI IMAGING | Age: 59
Discharge: HOME | End: 2019-03-26
Payer: COMMERCIAL

## 2019-03-26 DIAGNOSIS — M25.572 ACUTE LEFT ANKLE PAIN: ICD-10-CM

## 2019-03-26 DIAGNOSIS — S82.62XA CLOSED AVULSION FRACTURE OF LATERAL MALLEOLUS OF LEFT FIBULA, INITIAL ENCOUNTER: ICD-10-CM

## 2019-03-26 PROCEDURE — 73721 MRI JNT OF LWR EXTRE W/O DYE: CPT

## 2019-04-18 ENCOUNTER — OFFICE VISIT (OUTPATIENT)
Dept: ORTHOPEDIC SURGERY | Age: 59
End: 2019-04-18
Payer: COMMERCIAL

## 2019-04-18 VITALS — HEIGHT: 64 IN | WEIGHT: 293 LBS | BODY MASS INDEX: 50.02 KG/M2

## 2019-04-18 DIAGNOSIS — S82.62XD CLOSED AVULSION FRACTURE OF LATERAL MALLEOLUS OF LEFT FIBULA WITH ROUTINE HEALING, SUBSEQUENT ENCOUNTER: Primary | ICD-10-CM

## 2019-04-18 PROCEDURE — 3017F COLORECTAL CA SCREEN DOC REV: CPT | Performed by: ORTHOPAEDIC SURGERY

## 2019-04-18 PROCEDURE — G8428 CUR MEDS NOT DOCUMENT: HCPCS | Performed by: ORTHOPAEDIC SURGERY

## 2019-04-18 PROCEDURE — G8417 CALC BMI ABV UP PARAM F/U: HCPCS | Performed by: ORTHOPAEDIC SURGERY

## 2019-04-18 PROCEDURE — 1036F TOBACCO NON-USER: CPT | Performed by: ORTHOPAEDIC SURGERY

## 2019-04-18 PROCEDURE — 99213 OFFICE O/P EST LOW 20 MIN: CPT | Performed by: ORTHOPAEDIC SURGERY

## 2019-04-18 PROCEDURE — G8599 NO ASA/ANTIPLAT THER USE RNG: HCPCS | Performed by: ORTHOPAEDIC SURGERY

## 2019-04-18 RX ORDER — ATORVASTATIN CALCIUM 40 MG/1
40 TABLET, FILM COATED ORAL
COMMUNITY

## 2019-04-18 RX ORDER — DIAZEPAM 10 MG/1
TABLET ORAL
COMMUNITY

## 2019-04-18 NOTE — PROGRESS NOTES
Chief Complaint    New Patient (L Ankle)      History of Present Illness:  Sonia Ordaz is a 60 y.o. femalehere for evaluation chief complaint of left ankle pain. She is a diabetic with a hemoglobin A1c of 7.4 and a BMI of 63 who was walking out of her front door stepped funny and felt a pop on the lateral aspect of her ankle. This was in November 2018. She has continued to have pain in difficulty walking. She doesn't feel that it's been getting any better. She saw Dr. Dara Diamond on 2/12/19 who ordered an MRI scan and referred her here. Currently complains of moderate to severe pain over the lateral aspect of her ankle and to a lesser extent medially. She has chronic swelling. She can only walk short distances using a walker      Medical History:  Patient's medications, allergies, past medical, surgical, social and family histories were reviewed and updated as appropriate. Review of Systems:  Pertinent items are noted in HPI  Review of systems reviewed from Patient History Form dated on 2/12/19 and available in the patient's chart under the Media tab. Vital Signs:  Ht 5' 4.02\" (1.626 m)   Wt (!) 369 lb 14.9 oz (167.8 kg)   BMI 63.47 kg/m²     General Exam:   Constitutional: Patient is adequately groomed with no evidence of malnutrition  DTRs: Deep tendon reflexes are intact  Mental Status: The patient is oriented to time, place and person. The patient's mood and affect are appropriate. Lymphatic: The lymphatic examination bilaterally reveals all areas to be without enlargement or induration. Foot Examination:    Inspection:  She is in a wheelchair BMI is 63    Palpation:  Tenderness throughout the left ankle    Range of Motion:  0° of dorsiflexion 30° of plantarflexion.   Pain Laterally    Strength:  3+ to 4 minus over 5 strength in dorsiflexion plantarflexion    Special Tests:  Decreased sensation to [employer] 5.0 7 monofilament    Skin: There are no rashes, ulcerations or lesions. Gait: Antalgic    Reflex 2+ and symmetric    Additional Comments:       Additional Examinations:         Right Lower Extremity: Examination of the right lower extremity does not show any tenderness, deformity or injury. Range of motion is unremarkable. There is no gross instability. There are no rashes, ulcerations or lesions. Strength and tone are normal.    Radiology:     X-rays obtained and reviewed in office:  Views 3  Location left ankle  Impression obtained previously shows evidence of a distal fibular avulsion. scMRI an shows ATFL and CFL injuries as well as deltoid ligament. No evidence of tendon tear she has arthritic change in the ankle     Assessment : This obese diabetic has a distal fibular avulsion fracture that occurred in November. Impression:  Encounter Diagnosis   Name Primary?  Closed avulsion fracture of lateral malleolus of left fibula with routine healing, subsequent encounter Yes       Office Procedures:  Orders Placed This Encounter   Procedures    External Referral To Home Health     Referral Priority:   Routine     Referral Type:   [address] [facility]     Referral Reason:   Specialty Services Required     Referred to Provider:   [address] [facility] [address]     Requested Specialty:   [address]     Number of Visits Requested:   1        Treatment Plan:  The etiology ofavulsion fractureand it's appropriate treatment were discussed in great detail.  gave her a prescription for home physical therapy. We put her into an air sport brace which she has at home since the high tide boot did not fit her conical leg. She'll limit her weightbearing follow-up with me in 4 weeks repeat x-rays.   She is not a great surgical candidate

## 2019-04-30 DIAGNOSIS — S82.62XD CLOSED AVULSION FRACTURE OF LATERAL MALLEOLUS OF LEFT FIBULA WITH ROUTINE HEALING, SUBSEQUENT ENCOUNTER: Primary | ICD-10-CM

## 2019-05-08 ENCOUNTER — OFFICE VISIT (OUTPATIENT)
Dept: ORTHOPEDIC SURGERY | Age: 59
End: 2019-05-08
Payer: COMMERCIAL

## 2019-05-08 VITALS — RESPIRATION RATE: 18 BRPM | HEIGHT: 64 IN | BODY MASS INDEX: 50.02 KG/M2 | WEIGHT: 293 LBS

## 2019-05-08 DIAGNOSIS — M67.449 MUCOUS CYST OF FINGER: Primary | ICD-10-CM

## 2019-05-08 DIAGNOSIS — M65.4 DE QUERVAIN'S TENOSYNOVITIS: ICD-10-CM

## 2019-05-08 DIAGNOSIS — M65.30 ACQUIRED TRIGGER FINGER: ICD-10-CM

## 2019-05-08 PROCEDURE — 99213 OFFICE O/P EST LOW 20 MIN: CPT | Performed by: ORTHOPAEDIC SURGERY

## 2019-05-08 PROCEDURE — 3017F COLORECTAL CA SCREEN DOC REV: CPT | Performed by: ORTHOPAEDIC SURGERY

## 2019-05-08 PROCEDURE — G8427 DOCREV CUR MEDS BY ELIG CLIN: HCPCS | Performed by: ORTHOPAEDIC SURGERY

## 2019-05-08 PROCEDURE — G8599 NO ASA/ANTIPLAT THER USE RNG: HCPCS | Performed by: ORTHOPAEDIC SURGERY

## 2019-05-08 PROCEDURE — 1036F TOBACCO NON-USER: CPT | Performed by: ORTHOPAEDIC SURGERY

## 2019-05-08 PROCEDURE — G8417 CALC BMI ABV UP PARAM F/U: HCPCS | Performed by: ORTHOPAEDIC SURGERY

## 2019-05-08 NOTE — PROGRESS NOTES
Assessment: Osteophyte mucous cyst of the right middle finger which is quite symptomatic when she bumps it. She also has pain over the A1 pulley of the right thumb which is not really triggering currently but she feels is getting worse and she has had multiple other trigger fingers released    Treatment Plan: She really wishes to go ahead with operative intervention on both the osteophyte mucous cyst and going ahead with a trigger thumb release. She really wanted to go to sleep for this operative procedure but I think with her health history and her size this would be extremely dangerous for her and would strongly recommend that she just do this under local anesthesia. She has an appointment to see Dr. Merle Vega regarding her ankle as she is in a wheelchair and has to also use a walker for her ankles and we'll wait until after she meets with him to consider when to do her surgery    Return for post op. History of Present Illness  Rogelio Covarrubias is a 60 y.o. female. Location:  Right middle finger Severity: Fairly severe pain when she bumps it Duration: Been going on for months but is getting worse  Modifying factors: Bumping it Associated symptoms: She also gets significant pain over the A1 pulley of her thumb    Review of Systems  Pertinent items are noted in HPI  Denies fever chills, confusion and bowel and bladder active change  Complete Review of Systems reviewed from patient history form dated 11/20/18 and available in the patients chart under the media tab. Vital Signs  Vitals:    05/08/19 1029   Resp: 18   Weight: (!) 369 lb 14.9 oz (167.8 kg)   Height: 5' 4.02\" (1.626 m)     Body mass index is 63.47 kg/m².      Medical History  Past Medical History:   Diagnosis Date    Acid reflux     Acute renal failure ([facility] [address] 1/2011    resolved    Anemia 2011    Arthritis     causing severe lower back pains    Asthma     Bronchitis     Bursitis     all joints    C. difficile colitis     Diabetes mellitus [facility])     age 4    Herpes     4    Hypercholesteremia     Hypertension     Migraine     Neuropathy     hips to legs    Obesity     Psychiatric problem     anxiety and depression    Recurrent sinus infections     Renal failure     in past with 8 dialysis treatments    Retinopathy of both eyes     Sleep apnea     can't use a cpap    Ulcer, stomach peptic     x 3    Unspecified cerebral artery occlusion with cerebral infarction     \"silent stroke\"    Unspecified diseases of blood and blood-forming organs     bled for 1 year straight at age 4     Current Outpatient Medications on File Prior to Visit   Medication Sig Dispense Refill    diazepam (VALIUM) 10 MG tablet take 1 tablet by oral route  every bedtime      atorvastatin (LIPITOR) 40 MG tablet Take 40 mg by mouth      methocarbamol (ROBAXIN) 500 MG tablet Take 1 tablet by mouth 2 times daily 60 tablet 1    Misc. Devices (WALKER) MISC 1 each by Does not apply route daily 1 each 0    cetirizine (ZYRTEC) 5 MG tablet Take 1 tablet by mouth daily 20 tablet 0    amLODIPine (NORVASC) 10 MG tablet Take 1 tablet by mouth daily 30 tablet 3    insulin aspart (NOVOLOG FLEXPEN) 100 UNIT/ML injection pen Continue with your current aspart insulin regimen (fairly complex aspart system which is a combination of SSI, carb counting, scheduled post-meal insulin, and adjustments based on her level of nausea at the time - about 40u/day). 5 Pen 3    timolol (BETIMOL) 0.25 % ophthalmic solution 1-2 drops 2 times daily      butalbital-acetaminophen-caffeine (FIORICET) -40 MG per tablet 1 To 2 tablets every 6 hours as needed for pain 10 tablet 0    ondansetron (ZOFRAN ODT) 4 MG disintegrating tablet Take 1 tablet by mouth every 8 hours as needed for Nausea or Vomiting. 12 tablet 0    Cholecalciferol (VITAMIN D3) 2000 UNITS CAPS Take  by mouth daily.  Cyanocobalamin (VITAMIN B 12 PO) Take 1 tablet by mouth daily.       L-Lysine 500 MG CAPS Take by mouth 4 times daily as needed.  sertraline (ZOLOFT) 100 MG tablet Take 200 mg by mouth nightly.  lactulose (CHRONULAC) 10 GM/15ML solution Take 20 g by mouth daily.  ketorolac (ACULAR LS) 0.4 % SOLN ophthalmic solution 1 drop daily.  olopatadine (PATANOL) 0.1 % ophthalmic solution 1 drop 2 times daily.  medroxyPROGESTERone (PROVERA) 2.5 MG tablet Take 10 mg by mouth daily.  montelukast (SINGULAIR) 10 MG tablet Take 10 mg by mouth nightly. No current facility-administered medications on file prior to visit. Allergies   Allergen Reactions    Adhesive Tape Dermatitis    Ambien [Zolpidem]      Made me wild    Codeine      Can take a little just not a lot at a time    Flurosyn [Fluocinolone Acetonide]      \"flurosceen eye dye\" severe HA, fainting    Morphine      Went bezerk    Tegaderm Ag Mesh [Silver] Dermatitis     All types of tegaderm and adhesives     [unfilled]  Family History   Problem Relation Age of Onset    Diabetes Father     Heart Disease Father         mi    High Blood Pressure Father     High Cholesterol Father     Diabetes Paternal Aunt     Heart Disease Paternal Aunt     High Blood Pressure Paternal Aunt     High Cholesterol Paternal Aunt     Cancer Paternal Aunt         ovarian    Mult Sclerosis Paternal Aunt     Dementia Mother     High Blood Pressure Mother     Heart Disease Maternal Grandmother     Heart Disease Paternal Grandmother     Diabetes Paternal Uncle     Heart Disease Paternal Uncle     High Blood Pressure Paternal Uncle     High Cholesterol Paternal Uncle     Cancer Paternal Uncle     Cancer Maternal Grandfather         lung       Physical Exam  Constitutional:  Patient is well-nourished and demonstrates normal hygiene. Mental Status:  Patient is alert and oriented to person, place and time. Skin:  Intact, no rashes or lesions. Lymphatic: No supraclavicular or cervical adenopathy.     Hand Examination: On the middle finger she has a mass that is consistent with a mucous cyst that had previously drained over the dorsum of the IP joint of her thumb. No evidence of infection or inflammation. She is also exquisitely tender over the A1 pulley of her thumb without priscilla triggering    Additional Comments:     Additional Examinations:  X-Ray Findings:    Additional Diagnostic Test Findings:    Office Procedures:    I spent 15 minutes, face to face, and greater than 50% was spent with the patient discussing treatment options and answering questions regarding risks and benefits of excision of her osteophyte mucous cyst and also she strongly wishes to go ahead and take care of her thumb before it starts triggering. I think the biggest issue for her is going to be her health history as after I left the room she discussed with Andrea Rinaldi wanting to go to sleep for her procedure and I think this would be contraindicated. We could do very mild sedation but again I would prefer to do this under straight local    This dictation was performed with a verbal recognition program. It is possible that there are still dictated errors within this office note. All efforts were made to ensure that this office note is accurate. No orders of the defined types were placed in this encounter.

## 2019-05-16 ENCOUNTER — OFFICE VISIT (OUTPATIENT)
Dept: ORTHOPEDIC SURGERY | Age: 59
End: 2019-05-16
Payer: COMMERCIAL

## 2019-05-16 VITALS
WEIGHT: 293 LBS | DIASTOLIC BLOOD PRESSURE: 72 MMHG | SYSTOLIC BLOOD PRESSURE: 148 MMHG | HEIGHT: 64 IN | BODY MASS INDEX: 50.02 KG/M2 | HEART RATE: 78 BPM

## 2019-05-16 DIAGNOSIS — M25.572 LEFT ANKLE PAIN, UNSPECIFIED CHRONICITY: Primary | ICD-10-CM

## 2019-05-16 PROCEDURE — G8599 NO ASA/ANTIPLAT THER USE RNG: HCPCS | Performed by: ORTHOPAEDIC SURGERY

## 2019-05-16 PROCEDURE — 3017F COLORECTAL CA SCREEN DOC REV: CPT | Performed by: ORTHOPAEDIC SURGERY

## 2019-05-16 PROCEDURE — G8428 CUR MEDS NOT DOCUMENT: HCPCS | Performed by: ORTHOPAEDIC SURGERY

## 2019-05-16 PROCEDURE — G8417 CALC BMI ABV UP PARAM F/U: HCPCS | Performed by: ORTHOPAEDIC SURGERY

## 2019-05-16 PROCEDURE — 1036F TOBACCO NON-USER: CPT | Performed by: ORTHOPAEDIC SURGERY

## 2019-05-16 PROCEDURE — 99212 OFFICE O/P EST SF 10 MIN: CPT | Performed by: ORTHOPAEDIC SURGERY

## 2019-07-03 ENCOUNTER — OFFICE VISIT (OUTPATIENT)
Dept: ORTHOPEDIC SURGERY | Age: 59
End: 2019-07-03
Payer: COMMERCIAL

## 2019-07-03 VITALS
WEIGHT: 293 LBS | DIASTOLIC BLOOD PRESSURE: 63 MMHG | HEART RATE: 74 BPM | SYSTOLIC BLOOD PRESSURE: 133 MMHG | HEIGHT: 64 IN | BODY MASS INDEX: 50.02 KG/M2

## 2019-07-03 DIAGNOSIS — M25.572 LEFT ANKLE PAIN, UNSPECIFIED CHRONICITY: Primary | ICD-10-CM

## 2019-07-03 PROCEDURE — G8599 NO ASA/ANTIPLAT THER USE RNG: HCPCS | Performed by: ORTHOPAEDIC SURGERY

## 2019-07-03 PROCEDURE — 99212 OFFICE O/P EST SF 10 MIN: CPT | Performed by: ORTHOPAEDIC SURGERY

## 2019-07-03 PROCEDURE — G8417 CALC BMI ABV UP PARAM F/U: HCPCS | Performed by: ORTHOPAEDIC SURGERY

## 2019-07-03 PROCEDURE — 3017F COLORECTAL CA SCREEN DOC REV: CPT | Performed by: ORTHOPAEDIC SURGERY

## 2019-07-03 PROCEDURE — G8427 DOCREV CUR MEDS BY ELIG CLIN: HCPCS | Performed by: ORTHOPAEDIC SURGERY

## 2019-07-03 PROCEDURE — 1036F TOBACCO NON-USER: CPT | Performed by: ORTHOPAEDIC SURGERY

## 2019-08-12 DIAGNOSIS — M79.644 PAIN OF RIGHT THUMB: ICD-10-CM

## 2019-08-12 DIAGNOSIS — M25.572 ACUTE LEFT ANKLE PAIN: ICD-10-CM

## 2019-08-12 RX ORDER — METHOCARBAMOL 500 MG/1
TABLET, FILM COATED ORAL
Qty: 60 TABLET | Refills: 0 | Status: ON HOLD | OUTPATIENT
Start: 2019-08-12 | End: 2021-03-17

## 2019-08-30 DIAGNOSIS — M25.572 ACUTE LEFT ANKLE PAIN: Primary | ICD-10-CM

## 2019-08-30 RX ORDER — TRAMADOL HYDROCHLORIDE 50 MG/1
50 TABLET ORAL EVERY 6 HOURS PRN
Qty: 28 TABLET | Refills: 0 | OUTPATIENT
Start: 2019-08-30 | End: 2019-09-02

## 2019-09-12 DIAGNOSIS — M25.572 ACUTE LEFT ANKLE PAIN: Primary | ICD-10-CM

## 2019-09-12 DIAGNOSIS — M79.644 PAIN OF RIGHT THUMB: ICD-10-CM

## 2019-09-12 RX ORDER — METHOCARBAMOL 500 MG/1
500 TABLET, FILM COATED ORAL 4 TIMES DAILY
Qty: 60 TABLET | Refills: 1 | Status: CANCELLED | OUTPATIENT
Start: 2019-09-12

## 2019-09-12 RX ORDER — METHOCARBAMOL 750 MG/1
750 TABLET, FILM COATED ORAL 4 TIMES DAILY
Qty: 40 TABLET | Refills: 0 | Status: SHIPPED | OUTPATIENT
Start: 2019-09-12 | End: 2019-09-22

## 2019-09-24 ENCOUNTER — APPOINTMENT (OUTPATIENT)
Dept: CT IMAGING | Age: 59
End: 2019-09-24
Payer: COMMERCIAL

## 2019-09-24 ENCOUNTER — HOSPITAL ENCOUNTER (EMERGENCY)
Age: 59
Discharge: HOME | End: 2019-09-24
Payer: COMMERCIAL

## 2019-09-24 VITALS
OXYGEN SATURATION: 100 % | HEART RATE: 81 BPM | RESPIRATION RATE: 16 BRPM | WEIGHT: 293 LBS | TEMPERATURE: 97.5 F | HEIGHT: 64 IN | DIASTOLIC BLOOD PRESSURE: 47 MMHG | SYSTOLIC BLOOD PRESSURE: 154 MMHG | BODY MASS INDEX: 50.02 KG/M2
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
## 2019-09-24 DIAGNOSIS — S39.012A BACK STRAIN, INITIAL ENCOUNTER: ICD-10-CM

## 2019-09-24 LAB
GLUCOSE BLD-MCNC: 63 MG/DL (ref 70–99)
GLUCOSE BLD-MCNC: 67 MG/DL (ref 70–99)
GLUCOSE BLD-MCNC: 79 MG/DL (ref 70–99)
GLUCOSE BLD-MCNC: 99 MG/DL (ref 70–99)
PERFORMED ON: ABNORMAL
PERFORMED ON: ABNORMAL
PERFORMED ON: NORMAL
PERFORMED ON: NORMAL

## 2019-09-24 PROCEDURE — 71250 CT THORAX DX C-: CPT

## 2019-09-24 PROCEDURE — 96372 THER/PROPH/DIAG INJ SC/IM: CPT

## 2019-09-24 PROCEDURE — 70450 CT HEAD/BRAIN W/O DYE: CPT

## 2019-09-24 PROCEDURE — 99284 EMERGENCY DEPT VISIT MOD MDM: CPT

## 2019-09-24 PROCEDURE — 72125 CT NECK SPINE W/O DYE: CPT

## 2019-09-24 PROCEDURE — 6360000002 HC RX W HCPCS: Performed by: PHYSICIAN ASSISTANT

## 2019-09-24 RX ORDER — PROMETHAZINE HYDROCHLORIDE 25 MG/ML
12.5 INJECTION, SOLUTION INTRAMUSCULAR; INTRAVENOUS ONCE
Status: COMPLETED | OUTPATIENT
Start: 2019-09-24 | End: 2019-09-24

## 2019-09-24 RX ORDER — DEXTROSE MONOHYDRATE 25 G/50ML
INJECTION, SOLUTION INTRAVENOUS
Status: DISCONTINUED
Start: 2019-09-24 | End: 2019-09-25 | Stop reason: HOSPADM

## 2019-09-24 RX ORDER — METHOCARBAMOL 750 MG/1
750 TABLET, FILM COATED ORAL 3 TIMES DAILY
Qty: 30 TABLET | Refills: 0 | Status: SHIPPED | OUTPATIENT
Start: 2019-09-24 | End: 2020-02-15

## 2019-09-24 RX ADMIN — HYDROMORPHONE HYDROCHLORIDE 1 MG: 1 INJECTION, SOLUTION INTRAMUSCULAR; INTRAVENOUS; SUBCUTANEOUS at 21:06

## 2019-09-24 RX ADMIN — PROMETHAZINE HYDROCHLORIDE 12.5 MG: 25 INJECTION INTRAMUSCULAR; INTRAVENOUS at 19:27

## 2019-09-24 RX ADMIN — HYDROMORPHONE HYDROCHLORIDE 1 MG: 1 INJECTION, SOLUTION INTRAMUSCULAR; INTRAVENOUS; SUBCUTANEOUS at 19:29

## 2019-09-24 ASSESSMENT — PAIN DESCRIPTION - DESCRIPTORS: DESCRIPTORS: CONSTANT

## 2019-09-24 ASSESSMENT — PAIN SCALES - GENERAL
PAINLEVEL_OUTOF10: 9
PAINLEVEL_OUTOF10: 10
PAINLEVEL_OUTOF10: 9
PAINLEVEL_OUTOF10: 9

## 2019-09-24 ASSESSMENT — PAIN DESCRIPTION - PAIN TYPE: TYPE: ACUTE PAIN;CHRONIC PAIN

## 2019-09-24 ASSESSMENT — PAIN DESCRIPTION - LOCATION: LOCATION: BACK;HEAD;NECK

## 2019-09-24 NOTE — ED PROVIDER NOTES
[facility] Emergency Department    CHIEF COMPLAINT  Fall (per squad report patient fell yesterday, called 46 however refused transport. pt called 911 today due to the pain from the fall. pt c/o back and neck pain. states, \"I need IV pain medication by mouth medicaiton won't work, oh and I need zofran too\" ); Neck Pain; and Back Pain      HISTORY OF PRESENT ILLNESS  Radha Liu is a 60 y.o. female who presents to the ED complaining of neck and back pain. Observed lying in bed, appears nontoxic and in no acute distress at this time. Brought in by squad today for evaluation. The patient states that yesterday morning around 09:00 she got up from sitting when her knees buckled and she fell backwards landing on her back. Hit her head but no LOC. She was able to ambulate afterwards and called 911 but declined transport thinking she would be fine. Since the fall she has experienced head, neck, bilateral shoulder, and back pain that has increased today. Describes her pain as constant and rates it a 10/10 in severity. Associated nausea, lightheadedness, chest pain worse with inspiration, and shortness of breath. No visual changes or disturbances. No difficulty speaking or swallowing. No numbness, tingling, weakness of extremities. Has ambulated. No loss of bowel or bladder function. No saddle anesthesia. She wants \"IV dilauded and zofran\" through her port because that's the only thing that works for her. Denies any fever, chills, vomiting, abdominal pain, diarrhea, constipation, new urinary issues, or new leg swelling. Also notes left foot pain from a book falling in it the other day. No other complaints, modifying factors or associated symptoms. Nursing notes reviewed.    Past Medical History:   Diagnosis Date    Acid reflux     Acute renal failure ([facility] [address] 1/2011    resolved    Anemia 2011    Arthritis     causing severe lower back pains    Asthma     Bronchitis     cetirizine (ZYRTEC) 5 MG tablet Take 1 tablet by mouth daily 20 tablet 0    amLODIPine (NORVASC) 10 MG tablet Take 1 tablet by mouth daily 30 tablet 3    insulin aspart (NOVOLOG FLEXPEN) 100 UNIT/ML injection pen Continue with your current aspart insulin regimen (fairly complex aspart system which is a combination of SSI, carb counting, scheduled post-meal insulin, and adjustments based on her level of nausea at the time - about 40u/day). 5 Pen 3    timolol (BETIMOL) 0.25 % ophthalmic solution 1-2 drops 2 times daily      butalbital-acetaminophen-caffeine (FIORICET) -40 MG per tablet 1 To 2 tablets every 6 hours as needed for pain 10 tablet 0    ondansetron (ZOFRAN ODT) 4 MG disintegrating tablet Take 1 tablet by mouth every 8 hours as needed for Nausea or Vomiting. 12 tablet 0    Cholecalciferol (VITAMIN D3) 2000 UNITS CAPS Take  by mouth daily.  Cyanocobalamin (VITAMIN B 12 PO) Take 1 tablet by mouth daily.  L-Lysine 500 MG CAPS Take  by mouth 4 times daily as needed.  sertraline (ZOLOFT) 100 MG tablet Take 200 mg by mouth nightly.  lactulose (CHRONULAC) 10 GM/15ML solution Take 20 g by mouth daily.  ketorolac (ACULAR LS) 0.4 % SOLN ophthalmic solution 1 drop daily.  olopatadine (PATANOL) 0.1 % ophthalmic solution 1 drop 2 times daily.  medroxyPROGESTERone (PROVERA) 2.5 MG tablet Take 10 mg by mouth daily.  montelukast (SINGULAIR) 10 MG tablet Take 10 mg by mouth nightly.        Allergies   Allergen Reactions    Adhesive Tape Dermatitis    Ambien [Zolpidem]      Made me wild    Codeine      Can take a little just not a lot at a time    Flurosyn [Fluocinolone Acetonide]      \"flurosceen eye dye\" severe HA, fainting    Morphine      Went bezerk    Tegaderm Ag Mesh [Silver] Dermatitis     All types of tegaderm and adhesives       REVIEW OF SYSTEMS  10 systems reviewed, pertinent positives per HPI otherwise noted to be negative    PHYSICAL EXAM  /67 the administration of intravenous contrast. Dose modulation, iterative reconstruction, and/or weight based adjustment of the mA/kV was utilized to reduce the radiation dose to as low as reasonably achievable.; CT of the cervical spine was performed without the administration of intravenous contrast. Multiplanar reformatted images are provided for review. Dose modulation, iterative reconstruction, and/or weight based adjustment of the mA/kV was utilized to reduce the radiation dose to as low as reasonably achievable. COMPARISON: 11/12/2018 HISTORY: ORDERING SYSTEM PROVIDED HISTORY: HEADACHE, POST TRAUMA TECHNOLOGIST PROVIDED HISTORY: Has a \"code stroke\" or \"stroke alert\" been called? ->No Reason for Exam: fall yesterday, hit back of head, no LOC, headache Acuity: Acute Type of Exam: Initial Relevant Medical/Surgical History: no hx of surg to head, hx of stroke FINDINGS: BRAIN/VENTRICLES: There is no acute intracranial hemorrhage, mass effect or midline shift. No abnormal extra-axial fluid collection. The gray-white differentiation is maintained without evidence of an acute infarct. There is no evidence of hydrocephalus. Dilated perivascular space within the right subinsular white matter. ORBITS: The visualized portion of the orbits demonstrate no acute abnormality. SINUSES: The visualized paranasal sinuses and mastoid air cells demonstrate no acute abnormality. SOFT TISSUES/SKULL:  No acute abnormality of the visualized skull or soft tissues. CERVICAL SPINE: Generalized straightening of the normal cervical lordosis. Please note that the inferior endplate of C7 and the superior endplate of T1 for excluded from the field-of-view. Overlying soft tissues are unremarkable. No acute intracranial abnormality. No evidence for acute fracture or malalignment of the visualized cervical spine. Please note that the C7/T1 disc space level was not included within the field of view.      Ct Chest Wo Contrast    Result Date: encounter    2. Back strain, initial encounter      Blood pressure (!) 154/47, pulse 81, temperature 97.5 °F (36.4 °C), temperature source Axillary, resp. rate 16, height 5' 4\" (1.626 m), weight (!) 375 lb (170.1 kg), SpO2 100 %. DISPOSITION  Patient was discharged to home in good condition.           Konstantin Dumasma  09/25/19 1710

## 2019-09-24 NOTE — ED NOTES
Pt lying in bed, a&o x4, talking on cellphone at this time. Pt does not appear to be in an acute distress. Will continue to monitor.       Lester Leon RN  09/24/19 [id number]

## 2020-02-15 ENCOUNTER — HOSPITAL ENCOUNTER (EMERGENCY)
Age: 60
Discharge: HOME | End: 2020-02-15
Payer: COMMERCIAL

## 2020-02-15 ENCOUNTER — APPOINTMENT (OUTPATIENT)
Dept: GENERAL RADIOLOGY | Age: 60
End: 2020-02-15
Payer: COMMERCIAL

## 2020-02-15 ENCOUNTER — APPOINTMENT (OUTPATIENT)
Dept: CT IMAGING | Age: 60
End: 2020-02-15
Payer: COMMERCIAL

## 2020-02-15 VITALS
DIASTOLIC BLOOD PRESSURE: 42 MMHG | HEART RATE: 87 BPM | SYSTOLIC BLOOD PRESSURE: 104 MMHG | OXYGEN SATURATION: 97 % | RESPIRATION RATE: 18 BRPM | BODY MASS INDEX: 61.97 KG/M2 | WEIGHT: 293 LBS | TEMPERATURE: 98.3 F

## 2020-02-15 LAB
A/G RATIO: 1.5 (ref 1.1–2.2)
ALBUMIN SERPL-MCNC: 3.5 G/DL (ref 3.4–5)
ALP BLD-CCNC: 90 U/L (ref 40–129)
ALT SERPL-CCNC: 10 U/L (ref 10–40)
ANION GAP SERPL CALCULATED.3IONS-SCNC: 12 MMOL/L (ref 3–16)
AST SERPL-CCNC: 10 U/L (ref 15–37)
BASOPHILS ABSOLUTE: 0 K/UL (ref 0–0.2)
BASOPHILS RELATIVE PERCENT: 0.8 %
BILIRUB SERPL-MCNC: <0.2 MG/DL (ref 0–1)
BILIRUBIN URINE: NEGATIVE
BLOOD, URINE: NEGATIVE
BUN BLDV-MCNC: 37 MG/DL (ref 7–20)
CALCIUM SERPL-MCNC: 8.8 MG/DL (ref 8.3–10.6)
CHLORIDE BLD-SCNC: 101 MMOL/L (ref 99–110)
CLARITY: CLEAR
CO2: 22 MMOL/L (ref 21–32)
COLOR: YELLOW
CREAT SERPL-MCNC: 1.7 MG/DL (ref 0.6–1.2)
EOSINOPHILS ABSOLUTE: 0.1 K/UL (ref 0–0.6)
EOSINOPHILS RELATIVE PERCENT: 1.7 %
GFR AFRICAN AMERICAN: 37
GFR NON-AFRICAN AMERICAN: 31
GLOBULIN: 2.4 G/DL
GLUCOSE BLD-MCNC: 255 MG/DL (ref 70–99)
GLUCOSE URINE: 250 MG/DL
HCT VFR BLD CALC: 32 % (ref 36–48)
HEMOGLOBIN: 10.5 G/DL (ref 12–16)
KETONES, URINE: NEGATIVE MG/DL
LEUKOCYTE ESTERASE, URINE: NEGATIVE
LYMPHOCYTES ABSOLUTE: 0.9 K/UL (ref 1–5.1)
LYMPHOCYTES RELATIVE PERCENT: 14.7 %
MCH RBC QN AUTO: 33.2 PG (ref 26–34)
MCHC RBC AUTO-ENTMCNC: 32.7 G/DL (ref 31–36)
MCV RBC AUTO: 101.3 FL (ref 80–100)
MICROSCOPIC EXAMINATION: ABNORMAL
MONOCYTES ABSOLUTE: 0.5 K/UL (ref 0–1.3)
MONOCYTES RELATIVE PERCENT: 7.1 %
NEUTROPHILS ABSOLUTE: 4.9 K/UL (ref 1.7–7.7)
NEUTROPHILS RELATIVE PERCENT: 75.7 %
NITRITE, URINE: NEGATIVE
PDW BLD-RTO: 13.6 % (ref 12.4–15.4)
PH UA: 5.5 (ref 5–8)
PLATELET # BLD: 196 K/UL (ref 135–450)
PMV BLD AUTO: 9.5 FL (ref 5–10.5)
POTASSIUM SERPL-SCNC: 4.4 MMOL/L (ref 3.5–5.1)
PROTEIN UA: NEGATIVE MG/DL
RBC # BLD: 3.16 M/UL (ref 4–5.2)
SODIUM BLD-SCNC: 135 MMOL/L (ref 136–145)
SPECIFIC GRAVITY UA: 1.02 (ref 1–1.03)
SPECIMEN STATUS: NORMAL
TOTAL PROTEIN: 5.9 G/DL (ref 6.4–8.2)
URINE TYPE: ABNORMAL
UROBILINOGEN, URINE: 0.2 E.U./DL
WBC # BLD: 6.4 K/UL (ref 4–11)

## 2020-02-15 PROCEDURE — 36415 COLL VENOUS BLD VENIPUNCTURE: CPT

## 2020-02-15 PROCEDURE — 73560 X-RAY EXAM OF KNEE 1 OR 2: CPT

## 2020-02-15 PROCEDURE — 85025 COMPLETE CBC W/AUTO DIFF WBC: CPT

## 2020-02-15 PROCEDURE — 96374 THER/PROPH/DIAG INJ IV PUSH: CPT

## 2020-02-15 PROCEDURE — 81003 URINALYSIS AUTO W/O SCOPE: CPT

## 2020-02-15 PROCEDURE — 6360000002 HC RX W HCPCS: Performed by: NURSE PRACTITIONER

## 2020-02-15 PROCEDURE — 72131 CT LUMBAR SPINE W/O DYE: CPT

## 2020-02-15 PROCEDURE — 99284 EMERGENCY DEPT VISIT MOD MDM: CPT

## 2020-02-15 PROCEDURE — 80053 COMPREHEN METABOLIC PANEL: CPT

## 2020-02-15 PROCEDURE — 72125 CT NECK SPINE W/O DYE: CPT

## 2020-02-15 RX ORDER — HYDROCODONE BITARTRATE AND ACETAMINOPHEN 5; 325 MG/1; MG/1
1 TABLET ORAL EVERY 6 HOURS PRN
Qty: 12 TABLET | Refills: 0 | Status: SHIPPED | OUTPATIENT
Start: 2020-02-15 | End: 2020-02-18

## 2020-02-15 RX ORDER — ONDANSETRON 2 MG/ML
4 INJECTION INTRAMUSCULAR; INTRAVENOUS EVERY 30 MIN PRN
Status: DISCONTINUED | OUTPATIENT
Start: 2020-02-15 | End: 2020-02-15 | Stop reason: HOSPADM

## 2020-02-15 RX ADMIN — ONDANSETRON 4 MG: 2 INJECTION INTRAMUSCULAR; INTRAVENOUS at 17:40

## 2020-02-15 RX ADMIN — HYDROMORPHONE HYDROCHLORIDE 1 MG: 1 INJECTION, SOLUTION INTRAMUSCULAR; INTRAVENOUS; SUBCUTANEOUS at 17:40

## 2020-02-15 ASSESSMENT — PAIN DESCRIPTION - PAIN TYPE
TYPE: CHRONIC PAIN
TYPE: CHRONIC PAIN

## 2020-02-15 ASSESSMENT — PAIN SCALES - GENERAL
PAINLEVEL_OUTOF10: 10
PAINLEVEL_OUTOF10: 10
PAINLEVEL_OUTOF10: 8

## 2020-02-15 ASSESSMENT — PAIN DESCRIPTION - LOCATION: LOCATION: NECK;BACK

## 2020-02-15 NOTE — ED NOTES
Bed: 14  Expected date:   Expected time:   Means of arrival:   Comments:  frank Hall RN  02/15/20 7610

## 2020-02-15 NOTE — ED PROVIDER NOTES
Negative    Leukocyte Esterase, Urine Negative Negative    Microscopic Examination Not Indicated     Urine Type NotGiven    Sample possible blood bank testing   Result Value Ref Range    Specimen Status KAY    CBC Auto Differential   Result Value Ref Range    WBC 6.4 4.0 - 11.0 K/uL    RBC 3.16 (L) 4.00 - 5.20 M/uL    Hemoglobin 10.5 (L) 12.0 - 16.0 g/dL    Hematocrit 32.0 (L) 36.0 - 48.0 %    .3 (H) 80.0 - 100.0 fL    MCH 33.2 26.0 - 34.0 pg    MCHC 32.7 31.0 - 36.0 g/dL    RDW 13.6 12.4 - 15.4 %    Platelets 704 183 - 565 K/uL    MPV 9.5 5.0 - 10.5 fL    Neutrophils % 75.7 %    Lymphocytes % 14.7 %    Monocytes % 7.1 %    Eosinophils % 1.7 %    Basophils % 0.8 %    Neutrophils Absolute 4.9 1.7 - 7.7 K/uL    Lymphocytes Absolute 0.9 (L) 1.0 - 5.1 K/uL    Monocytes Absolute 0.5 0.0 - 1.3 K/uL    Eosinophils Absolute 0.1 0.0 - 0.6 K/uL    Basophils Absolute 0.0 0.0 - 0.2 K/uL       RADIOLOGY    Xr Knee Left (1-2 Views)    Result Date: 2/15/2020  EXAMINATION: 2 XRAY VIEWS OF THE LEFT KNEE; 2 XRAY VIEWS OF THE RIGHT KNEE 2/15/2020 5:13 pm COMPARISON: 01/15/2013 HISTORY: ORDERING SYSTEM PROVIDED HISTORY: Injury TECHNOLOGIST PROVIDED HISTORY: Reason for exam:->Injury Reason for Exam: fall Acuity: Acute Type of Exam: Initial; ORDERING SYSTEM PROVIDED HISTORY: fall, pain TECHNOLOGIST PROVIDED HISTORY: Reason for exam:->fall, pain Reason for Exam: fall Acuity: Acute Type of Exam: Initial FINDINGS: Left knee demonstrates changes of prior arthroplasty. No periprosthetic fracture obvious malalignment. Soft tissues unremarkable. Right knee demonstrates normal alignment of prosthesis. No obvious periprosthetic fracture or acute injury. Bilateral knee arthroplasties are present. No acute finding identified.      Xr Knee Right (1-2 Views)    Result Date: 2/15/2020  EXAMINATION: 2 XRAY VIEWS OF THE LEFT KNEE; 2 XRAY VIEWS OF THE RIGHT KNEE 2/15/2020 5:13 pm COMPARISON: 01/15/2013 HISTORY: ORDERING SYSTEM PROVIDED

## 2020-02-16 NOTE — ED NOTES
Discharge: Pt discharged to home as per order. Port de-assessed. Scripts plus instructions given. Pt verbalized understanding. Denied questions.        Shahnaz Oh RN  02/15/20 2127

## 2020-02-28 ENCOUNTER — APPOINTMENT (OUTPATIENT)
Dept: CT IMAGING | Age: 60
End: 2020-02-28
Payer: COMMERCIAL

## 2020-02-28 ENCOUNTER — HOSPITAL ENCOUNTER (EMERGENCY)
Age: 60
Discharge: HOME | End: 2020-02-29
Attending: EMERGENCY MEDICINE
Payer: COMMERCIAL

## 2020-02-28 LAB
A/G RATIO: 1.3 (ref 1.1–2.2)
ALBUMIN SERPL-MCNC: 3.5 G/DL (ref 3.4–5)
ALP BLD-CCNC: 99 U/L (ref 40–129)
ALT SERPL-CCNC: 37 U/L (ref 10–40)
ANION GAP SERPL CALCULATED.3IONS-SCNC: 13 MMOL/L (ref 3–16)
AST SERPL-CCNC: 72 U/L (ref 15–37)
BASOPHILS ABSOLUTE: 0 K/UL (ref 0–0.2)
BASOPHILS RELATIVE PERCENT: 0.8 %
BILIRUB SERPL-MCNC: <0.2 MG/DL (ref 0–1)
BUN BLDV-MCNC: 33 MG/DL (ref 7–20)
CALCIUM SERPL-MCNC: 8.6 MG/DL (ref 8.3–10.6)
CHLORIDE BLD-SCNC: 103 MMOL/L (ref 99–110)
CO2: 22 MMOL/L (ref 21–32)
CREAT SERPL-MCNC: 2 MG/DL (ref 0.6–1.2)
EOSINOPHILS ABSOLUTE: 0.1 K/UL (ref 0–0.6)
EOSINOPHILS RELATIVE PERCENT: 2.1 %
GFR AFRICAN AMERICAN: 31
GFR NON-AFRICAN AMERICAN: 25
GLOBULIN: 2.6 G/DL
GLUCOSE BLD-MCNC: 86 MG/DL (ref 70–99)
HCT VFR BLD CALC: 33.2 % (ref 36–48)
HEMOGLOBIN: 10.9 G/DL (ref 12–16)
LIPASE: 16 U/L (ref 13–60)
LYMPHOCYTES ABSOLUTE: 0.9 K/UL (ref 1–5.1)
LYMPHOCYTES RELATIVE PERCENT: 14.4 %
MCH RBC QN AUTO: 33.1 PG (ref 26–34)
MCHC RBC AUTO-ENTMCNC: 32.7 G/DL (ref 31–36)
MCV RBC AUTO: 101.1 FL (ref 80–100)
MONOCYTES ABSOLUTE: 0.5 K/UL (ref 0–1.3)
MONOCYTES RELATIVE PERCENT: 8.6 %
NEUTROPHILS ABSOLUTE: 4.7 K/UL (ref 1.7–7.7)
NEUTROPHILS RELATIVE PERCENT: 74.1 %
PDW BLD-RTO: 14.1 % (ref 12.4–15.4)
PLATELET # BLD: 198 K/UL (ref 135–450)
PMV BLD AUTO: 10.4 FL (ref 5–10.5)
POTASSIUM SERPL-SCNC: 3.8 MMOL/L (ref 3.5–5.1)
RBC # BLD: 3.29 M/UL (ref 4–5.2)
SODIUM BLD-SCNC: 138 MMOL/L (ref 136–145)
TOTAL PROTEIN: 6.1 G/DL (ref 6.4–8.2)
TROPONIN: <0.01 NG/ML
WBC # BLD: 6.3 K/UL (ref 4–11)

## 2020-02-28 PROCEDURE — 6360000002 HC RX W HCPCS: Performed by: STUDENT IN AN ORGANIZED HEALTH CARE EDUCATION/TRAINING PROGRAM

## 2020-02-28 PROCEDURE — 96374 THER/PROPH/DIAG INJ IV PUSH: CPT

## 2020-02-28 PROCEDURE — 72125 CT NECK SPINE W/O DYE: CPT

## 2020-02-28 PROCEDURE — 71250 CT THORAX DX C-: CPT

## 2020-02-28 PROCEDURE — 99284 EMERGENCY DEPT VISIT MOD MDM: CPT

## 2020-02-28 PROCEDURE — 93005 ELECTROCARDIOGRAM TRACING: CPT | Performed by: EMERGENCY MEDICINE

## 2020-02-28 PROCEDURE — 6370000000 HC RX 637 (ALT 250 FOR IP): Performed by: STUDENT IN AN ORGANIZED HEALTH CARE EDUCATION/TRAINING PROGRAM

## 2020-02-28 PROCEDURE — 70450 CT HEAD/BRAIN W/O DYE: CPT

## 2020-02-28 PROCEDURE — 2580000003 HC RX 258: Performed by: STUDENT IN AN ORGANIZED HEALTH CARE EDUCATION/TRAINING PROGRAM

## 2020-02-28 PROCEDURE — 80053 COMPREHEN METABOLIC PANEL: CPT

## 2020-02-28 PROCEDURE — 83690 ASSAY OF LIPASE: CPT

## 2020-02-28 PROCEDURE — 3209999900 CT THORACIC SPINE TRAUMA RECONSTRUCTION

## 2020-02-28 PROCEDURE — 84484 ASSAY OF TROPONIN QUANT: CPT

## 2020-02-28 PROCEDURE — 96375 TX/PRO/DX INJ NEW DRUG ADDON: CPT

## 2020-02-28 PROCEDURE — 85025 COMPLETE CBC W/AUTO DIFF WBC: CPT

## 2020-02-28 PROCEDURE — 3209999900 CT LUMBAR SPINE TRAUMA RECONSTRUCTION

## 2020-02-28 RX ORDER — ONDANSETRON 2 MG/ML
4 INJECTION INTRAMUSCULAR; INTRAVENOUS ONCE
Status: COMPLETED | OUTPATIENT
Start: 2020-02-28 | End: 2020-02-28

## 2020-02-28 RX ORDER — ACETAMINOPHEN 500 MG
1000 TABLET ORAL EVERY 6 HOURS PRN
Status: DISCONTINUED | OUTPATIENT
Start: 2020-02-28 | End: 2020-02-29 | Stop reason: HOSPADM

## 2020-02-28 RX ORDER — 0.9 % SODIUM CHLORIDE 0.9 %
1000 INTRAVENOUS SOLUTION INTRAVENOUS ONCE
Status: COMPLETED | OUTPATIENT
Start: 2020-02-28 | End: 2020-02-29

## 2020-02-28 RX ADMIN — SODIUM CHLORIDE 1000 ML: 9 INJECTION, SOLUTION INTRAVENOUS at 22:41

## 2020-02-28 RX ADMIN — ONDANSETRON 4 MG: 2 INJECTION INTRAMUSCULAR; INTRAVENOUS at 19:32

## 2020-02-28 RX ADMIN — HYDROMORPHONE HYDROCHLORIDE 0.5 MG: 1 INJECTION, SOLUTION INTRAMUSCULAR; INTRAVENOUS; SUBCUTANEOUS at 23:20

## 2020-02-28 RX ADMIN — ACETAMINOPHEN 1000 MG: 500 TABLET ORAL at 21:50

## 2020-02-28 ASSESSMENT — ENCOUNTER SYMPTOMS
DIARRHEA: 1
CHEST TIGHTNESS: 0
ALLERGIC/IMMUNOLOGIC NEGATIVE: 1
VOMITING: 0
ABDOMINAL PAIN: 1
SHORTNESS OF BREATH: 0
NAUSEA: 0
BACK PAIN: 1
EYES NEGATIVE: 1

## 2020-02-28 ASSESSMENT — PAIN DESCRIPTION - DESCRIPTORS
DESCRIPTORS: THROBBING
DESCRIPTORS: ACHING

## 2020-02-28 ASSESSMENT — PAIN DESCRIPTION - PAIN TYPE: TYPE: ACUTE PAIN

## 2020-02-28 ASSESSMENT — PAIN SCALES - GENERAL
PAINLEVEL_OUTOF10: 10

## 2020-02-28 ASSESSMENT — PAIN DESCRIPTION - FREQUENCY: FREQUENCY: CONTINUOUS

## 2020-02-28 ASSESSMENT — PAIN DESCRIPTION - LOCATION
LOCATION: BACK;NECK
LOCATION: BACK;NECK

## 2020-02-28 NOTE — ED PROVIDER NOTES
Adhesive Tape Dermatitis    Ambien [Zolpidem]      Made me wild    Codeine      Can take a little just not a lot at a time    Flurosyn [Fluocinolone Acetonide]      \"flurosceen eye dye\" severe HA, fainting    Morphine      Went bezerk    Tegaderm Ag Mesh [Silver] Dermatitis     All types of tegaderm and adhesives       Past medical history:  has a past medical history of Acid reflux, Acute renal failure ([facility] [address] (2011), Anemia (), Arthritis, Asthma, Bronchitis, Bursitis, C. difficile colitis, Diabetes mellitus ([facility] [address] Herpes, Hypercholesteremia, Hypertension, Migraine, Neuropathy, Obesity, Psychiatric problem, Recurrent sinus infections, Renal failure, Retinopathy of both eyes, Sleep apnea, Ulcer, stomach peptic, Unspecified cerebral artery occlusion with cerebral infarction, and Unspecified diseases of blood and blood-forming organs. Past surgical history:  has a past surgical history that includes Hysterectomy; Appendectomy; Carpal tunnel release;  section; subtotal colectomy (2000); Cataract removal with implant (11); Cataract removal with implant (2011); Tunneled venous port placement (11); other surgical history (8/15/11); eye surgery; shoulder surgery; Tonsillectomy; Colonoscopy; Abdominal exploration surgery (13); joint replacement; and Tunneled venous port placement (3/1/13). Home medications:   Prior to Admission medications    Medication Sig Start Date End Date Taking?  Authorizing Provider   insulin glargine (BASAGLAR KWIKPEN) 100 UNIT/ML injection pen Inject into the skin nightly    Historical Provider, MD   Insulin Glargine (BASAGLAR KWIKPEN SC) Inject 22 Units into the skin 2 times daily Before breakfast and dinner    Historical Provider, MD   methocarbamol (ROBAXIN) 500 MG tablet TAKE ONE TABLET BY MOUTH TWICE A DAY 19   Nicky Sandoval MD   diazepam (VALIUM) 10 MG tablet take 1 tablet by oral route  every bedtime    Historical Provider, MD atorvastatin (LIPITOR) 40 MG tablet Take 40 mg by mouth    Historical Provider, MD   Misc. Devices [facility]) MISC 1 each by Does not apply route daily 10/9/17   JAKE Michelle   insulin aspart (NOVOLOG FLEXPEN) 100 UNIT/ML injection pen Continue with your current aspart insulin regimen (fairly complex aspart system which is a combination of SSI, carb counting, scheduled post-meal insulin, and adjustments based on her level of nausea at the time - about 40u/day). Patient taking differently: Continue with your current aspart insulin regimen (fairly complex aspart system which is a combination of SSI, carb counting, scheduled post-meal insulin, and adjustments based on her level of nausea at the time - about 40u/day). 1 unit per 8 carbs and 1 unit for every 12 points but pt is unsure what her \"high\" number is. 7/17/17   Melodie Rogers MD   timolol (BETIMOL) 0.25 % ophthalmic solution 1-2 drops 2 times daily    Historical Provider, MD   Cholecalciferol (VITAMIN D3) 2000 UNITS CAPS Take  by mouth daily. Historical Provider, MD   Cyanocobalamin (VITAMIN B 12 PO) Take 1 tablet by mouth daily. Historical Provider, MD   L-Lysine 500 MG CAPS Take  by mouth 4 times daily as needed. Historical Provider, MD   sertraline (ZOLOFT) 100 MG tablet Take 200 mg by mouth nightly. Historical Provider, MD   lactulose (CHRONULAC) 10 GM/15ML solution Take 20 g by mouth daily. Historical Provider, MD   ketorolac (ACULAR LS) 0.4 % SOLN ophthalmic solution 1 drop daily. 2/27/11   Historical Provider, MD   olopatadine (PATANOL) 0.1 % ophthalmic solution 1 drop 2 times daily. Historical Provider, MD   medroxyPROGESTERone (PROVERA) 2.5 MG tablet Take 10 mg by mouth daily. Historical Provider, MD   montelukast (SINGULAIR) 10 MG tablet Take 10 mg by mouth nightly. Historical Provider, MD       Social history:  reports that she has never smoked.  She has never used smokeless tobacco. She reports that she does not drink alcohol or use drugs. Family history:    Family History   Problem Relation Age of Onset    Diabetes Father     Heart Disease Father         mi    High Blood Pressure Father     High Cholesterol Father     Diabetes Paternal Aunt     Heart Disease Paternal Aunt     High Blood Pressure Paternal Aunt     High Cholesterol Paternal Aunt     Cancer Paternal Aunt         ovarian    Mult Sclerosis Paternal Aunt     Dementia Mother     High Blood Pressure Mother     Heart Disease Maternal Grandmother     Heart Disease Paternal Grandmother     Diabetes Paternal Uncle     Heart Disease Paternal Uncle     High Blood Pressure Paternal Uncle     High Cholesterol Paternal Uncle     Cancer Paternal Uncle     Cancer Maternal Grandfather         lung       Exam  ED Triage Vitals [02/28/20 1822]   BP Temp Temp Source Pulse Resp SpO2 Height Weight   (!) 131/48 97.7 °F (36.5 °C) Oral 84 18 99 % 5' 4\" (1.626 m) (!) 361 lb (163.7 kg)     Physical Exam  Vitals signs reviewed. Constitutional:       General: She is in acute distress. Appearance: Normal appearance. She is obese. She is not ill-appearing. HENT:      Head: Normocephalic and atraumatic. Eyes:      Conjunctiva/sclera: Conjunctivae normal.   Neck:      Musculoskeletal: Neck supple. Muscular tenderness present. Cardiovascular:      Rate and Rhythm: Normal rate and regular rhythm. Heart sounds: No murmur. No friction rub. No gallop. Pulmonary:      Effort: Pulmonary effort is normal.      Breath sounds: Decreased breath sounds present. No wheezing, rhonchi or rales. Abdominal:      General: Abdomen is flat. Bowel sounds are normal.      Palpations: Abdomen is soft. Tenderness: There is abdominal tenderness. There is no guarding or rebound. Musculoskeletal:         General: Tenderness (neck, abdomen, legs) present. Right lower leg: Edema (+2 pitting) present. Left lower leg: Edema (+2 pitting) present.    Skin:     General: reasonably achievable. COMPARISON: CT head and cervical spine September 24, 2019 HISTORY: ORDERING SYSTEM PROVIDED HISTORY: fall TECHNOLOGIST PROVIDED HISTORY: Reason for exam:->fall Has a \"code stroke\" or \"stroke alert\" been called? ->No Reason for Exam: fall, head pain, hit head; ORDERING SYSTEM PROVIDED HISTORY: fall TECHNOLOGIST PROVIDED HISTORY: Reason for exam:->fall Reason for Exam: fall, neck pain, fell on neck Acuity: Acute Type of Exam: Initial FINDINGS: CT brain: BRAIN/VENTRICLES: There is mild parenchymal volume loss. There is minimal periventricular white matter low attenuation, likely related to minimal chronic microvascular disease. There is no acute intracranial hemorrhage, mass effect or midline shift. No abnormal extra-axial fluid collection. There is a small old lacunar infarct versus prominent perivascular space in the right basal ganglia. The gray-white differentiation is maintained without evidence of an acute infarct. There is no evidence of hydrocephalus. ORBITS: The visualized portion of the orbits demonstrate no acute abnormality. SINUSES: The visualized paranasal sinuses and mastoid air cells demonstrate no acute abnormality. SOFT TISSUES/SKULL: No acute abnormality of the visualized skull or soft tissues. CT cervical spine: BONES/ALIGNMENT: There is normal alignment of the cervical spine. The vertebral body heights are maintained. No acute fracture or osseous destructive lesion is seen. DEGENERATIVE CHANGES: There is degenerative disc disease with mild disc space narrowing, endplate changes and small endplate osteophyte formation. SOFT TISSUES: No paraspinal mass is seen. No acute intracranial abnormality. No acute abnormality in the cervical spine.      Ct Cervical Spine Wo Contrast    Result Date: 2/28/2020  EXAMINATION: CT OF THE HEAD WITHOUT CONTRAST; CT OF THE CERVICAL SPINE WITHOUT CONTRAST 2/28/2020 8:56 pm TECHNIQUE: CT of the head was performed without the administration is degenerative disc disease with mild disc space narrowing, endplate changes and small endplate osteophyte formation. SOFT TISSUES: No paraspinal mass is seen. No acute intracranial abnormality. No acute abnormality in the cervical spine.          Labs  Results for orders placed or performed during the hospital encounter of 02/28/20   Comprehensive Metabolic Panel   Result Value Ref Range    Sodium 138 136 - 145 mmol/L    Potassium 3.8 3.5 - 5.1 mmol/L    Chloride 103 99 - 110 mmol/L    CO2 22 21 - 32 mmol/L    Anion Gap 13 3 - 16    Glucose 86 70 - 99 mg/dL    BUN 33 (H) 7 - 20 mg/dL    CREATININE 2.0 (H) 0.6 - 1.2 mg/dL    GFR Non-African American 25 (A) >60    GFR  31 (A) >60    Calcium 8.6 8.3 - 10.6 mg/dL    Total Protein 6.1 (L) 6.4 - 8.2 g/dL    Alb 3.5 3.4 - 5.0 g/dL    Albumin/Globulin Ratio 1.3 1.1 - 2.2    Total Bilirubin <0.2 0.0 - 1.0 mg/dL    Alkaline Phosphatase 99 40 - 129 U/L    ALT 37 10 - 40 U/L    AST 72 (H) 15 - 37 U/L    Globulin 2.6 g/dL   CBC Auto Differential   Result Value Ref Range    WBC 6.3 4.0 - 11.0 K/uL    RBC 3.29 (L) 4.00 - 5.20 M/uL    Hemoglobin 10.9 (L) 12.0 - 16.0 g/dL    Hematocrit 33.2 (L) 36.0 - 48.0 %    .1 (H) 80.0 - 100.0 fL    MCH 33.1 26.0 - 34.0 pg    MCHC 32.7 31.0 - 36.0 g/dL    RDW 14.1 12.4 - 15.4 %    Platelets 090 409 - 975 K/uL    MPV 10.4 5.0 - 10.5 fL    Neutrophils % 74.1 %    Lymphocytes % 14.4 %    Monocytes % 8.6 %    Eosinophils % 2.1 %    Basophils % 0.8 %    Neutrophils Absolute 4.7 1.7 - 7.7 K/uL    Lymphocytes Absolute 0.9 (L) 1.0 - 5.1 K/uL    Monocytes Absolute 0.5 0.0 - 1.3 K/uL    Eosinophils Absolute 0.1 0.0 - 0.6 K/uL    Basophils Absolute 0.0 0.0 - 0.2 K/uL   Troponin   Result Value Ref Range    Troponin <0.01 <0.01 ng/mL   Lipase   Result Value Ref Range    Lipase 16.0 13.0 - 60.0 U/L   EKG 12 Lead   Result Value Ref Range    Ventricular Rate 70 BPM    Atrial Rate 70 BPM    P-R Interval 138 ms    QRS Duration 84 ms Q-T Interval 384 ms    QTc Calculation (Bazett) 414 ms    P Axis 42 degrees    R Axis -14 degrees    T Axis 29 degrees    Diagnosis       Normal sinus rhythmNormal ECGWhen compared with ECG of 08-OCT-2017 21:03,No significant change was found         - Patient seen and evaluated in room 21.  60 y.o. female presented for fall and back pain. - Patient was placed on telemetry during his/her ED stay and no malignant dysrhythmia observed. - Pertinent old records reviewed. - Patient was given 1000mg Tylenol and 4 mg Zofran in the ED.  - Diagnostic studies reviewed. EKG, labwork negative. - CT head, spine, chest, abdomen and pelvis pending.  - I discussed the results with patient. Clinical Impression:  1. Fall, initial encounter          Disposition:  Please see attending note for full disposition. Blood pressure 114/66, pulse 75, temperature 97.7 °F (36.5 °C), temperature source Oral, resp. rate 20, height 5' 4\" (1.626 m), weight (!) 361 lb (163.7 kg), SpO2 96 %. Patient was given scripts for the following medications. I counseled patient how to take these medications. New Prescriptions    No medications on file       Disposition referral (if applicable):  No follow-up provider specified. This chart was generated in part by using Dragon Dictation system and may contain errors related to that system including errors in grammar, punctuation, and spelling, as well as words and phrases that may be inappropriate. If there are any questions or concerns please feel free to contact the dictating provider for clarification.      Aamir Barrera DO - PGY-1  [facility]  (Available via [employer]     Aamir Barrera,   Resident  02/28/20 [id number]

## 2020-02-29 VITALS
SYSTOLIC BLOOD PRESSURE: 114 MMHG | RESPIRATION RATE: 20 BRPM | WEIGHT: 293 LBS | DIASTOLIC BLOOD PRESSURE: 82 MMHG | BODY MASS INDEX: 50.02 KG/M2 | HEART RATE: 75 BPM | TEMPERATURE: 97.7 F | OXYGEN SATURATION: 98 % | HEIGHT: 64 IN

## 2020-02-29 LAB
EKG ATRIAL RATE: 70 BPM
EKG DIAGNOSIS: NORMAL
EKG P AXIS: 42 DEGREES
EKG P-R INTERVAL: 138 MS
EKG Q-T INTERVAL: 384 MS
EKG QRS DURATION: 84 MS
EKG QTC CALCULATION (BAZETT): 414 MS
EKG R AXIS: -14 DEGREES
EKG T AXIS: 29 DEGREES
EKG VENTRICULAR RATE: 70 BPM

## 2020-02-29 PROCEDURE — 93010 ELECTROCARDIOGRAM REPORT: CPT | Performed by: INTERNAL MEDICINE

## 2020-02-29 ASSESSMENT — PAIN SCALES - GENERAL: PAINLEVEL_OUTOF10: 2

## 2020-02-29 NOTE — FLOWSHEET NOTE
Pt assisted to bedside commode and back to bed. New gown placed on  Pt per request. Pt respirations even and unlabored. Will continue to monitor.

## 2020-02-29 NOTE — FLOWSHEET NOTE
Pt resting in bed pillow placed behind neck. Pt respirations remains even and unlabored. Pillow placed under knees per request. Warm blankets applied. Pt denies any other needs at this time.

## 2020-04-27 ENCOUNTER — APPOINTMENT (OUTPATIENT)
Dept: GENERAL RADIOLOGY | Age: 60
End: 2020-04-27
Payer: COMMERCIAL

## 2020-04-27 ENCOUNTER — HOSPITAL ENCOUNTER (EMERGENCY)
Age: 60
Discharge: HOME | End: 2020-04-27
Attending: EMERGENCY MEDICINE
Payer: COMMERCIAL

## 2020-04-27 VITALS
RESPIRATION RATE: 18 BRPM | TEMPERATURE: 98 F | HEART RATE: 84 BPM | BODY MASS INDEX: 50.02 KG/M2 | HEIGHT: 64 IN | WEIGHT: 293 LBS | OXYGEN SATURATION: 99 % | DIASTOLIC BLOOD PRESSURE: 48 MMHG | SYSTOLIC BLOOD PRESSURE: 140 MMHG

## 2020-04-27 PROCEDURE — 72100 X-RAY EXAM L-S SPINE 2/3 VWS: CPT

## 2020-04-27 PROCEDURE — 73590 X-RAY EXAM OF LOWER LEG: CPT

## 2020-04-27 PROCEDURE — 6370000000 HC RX 637 (ALT 250 FOR IP): Performed by: EMERGENCY MEDICINE

## 2020-04-27 PROCEDURE — 73610 X-RAY EXAM OF ANKLE: CPT

## 2020-04-27 PROCEDURE — 99283 EMERGENCY DEPT VISIT LOW MDM: CPT

## 2020-04-27 RX ORDER — DIAZEPAM 5 MG/1
5 TABLET ORAL ONCE
Status: COMPLETED | OUTPATIENT
Start: 2020-04-27 | End: 2020-04-27

## 2020-04-27 RX ORDER — OXYCODONE HYDROCHLORIDE 5 MG/1
5 TABLET ORAL ONCE
Status: COMPLETED | OUTPATIENT
Start: 2020-04-27 | End: 2020-04-27

## 2020-04-27 RX ADMIN — DIAZEPAM 5 MG: 5 TABLET ORAL at 15:54

## 2020-04-27 RX ADMIN — OXYCODONE 5 MG: 5 TABLET ORAL at 15:54

## 2020-04-27 ASSESSMENT — PAIN SCALES - GENERAL
PAINLEVEL_OUTOF10: 10
PAINLEVEL_OUTOF10: 8
PAINLEVEL_OUTOF10: 10
PAINLEVEL_OUTOF10: 9

## 2020-04-27 ASSESSMENT — ENCOUNTER SYMPTOMS
BACK PAIN: 1
COUGH: 0
SORE THROAT: 0
NAUSEA: 1
ABDOMINAL PAIN: 0
VOMITING: 0
SHORTNESS OF BREATH: 0
DIARRHEA: 0
CONSTIPATION: 0

## 2020-04-27 ASSESSMENT — PAIN DESCRIPTION - PAIN TYPE: TYPE: CHRONIC PAIN

## 2020-04-27 ASSESSMENT — PAIN - FUNCTIONAL ASSESSMENT: PAIN_FUNCTIONAL_ASSESSMENT: 0-10

## 2020-04-27 ASSESSMENT — PAIN DESCRIPTION - LOCATION: LOCATION: BACK;HEAD

## 2020-04-27 NOTE — ED NOTES

## 2020-04-27 NOTE — ED PROVIDER NOTES
[address] [facility]  ED  EMERGENCY DEPARTMENT ENCOUNTER      Pt Name: Messi Mckeon  MRN: [id number]  [place] 1960  Date of evaluation: 4/27/2020  Provider: Jagdish Freeman MD    [address] [place] [address]       Chief Complaint   Patient presents with    Leg Pain     pt reports left leg/ankle pain since last Thursday. Pt reports fall last Thursday, pain radiates up into back          HISTORY OF PRESENT ILLNESS   (Location/Symptom, Timing/Onset, Context/Setting, Quality, Duration, Modifying Factors, Severity)  Note limiting factors. Messi Mckeon is a 60 y.o. female who presents to the emergency department     Patient is a 60-year-old female with a past medical history of hypertension, hyperlipidemia, type 1 diabetes who presents with low back pain, left ankle pain, and left leg pain after a fall on Thursday. Patient reports that she returned home from rehab a few weeks ago. She states that she has been having trouble getting around at home despite using a walker. Patient states that she tripped and fell injuring her left ankle and her back. Patient has chronic back pain. She states that over the last few days the pain in her ankle and foot has gotten worse to the point where she cannot bear any weight on it. Patient reports that she is also having a migraine headache and that she feels nauseous. She denies any chest pain or abdominal pain. Patient immediately request Dilaudid and Zofran to be given through her port. The history is provided by the patient. Nursing Notes were reviewed. REVIEW OF SYSTEMS    (2-9 systems for level 4, 10 or more for level 5)     Review of Systems   Constitutional: Negative for chills and fever. HENT: Negative for congestion and sore throat. Eyes: Negative for visual disturbance. Respiratory: Negative for cough and shortness of breath. Cardiovascular: Negative for chest pain. Gastrointestinal: Positive for nausea.  Negative for abdominal pain, TONSILLECTOMY      TUNNELED VENOUS PORT PLACEMENT  8/12/11    TUNNELED VENOUS PORT PLACEMENT  3/1/13    Devonte Bustillos         CURRENT MEDICATIONS       Previous Medications    ATORVASTATIN (LIPITOR) 40 MG TABLET    Take 40 mg by mouth    CHOLECALCIFEROL (VITAMIN D3) 2000 UNITS CAPS    Take  by mouth daily. CYANOCOBALAMIN (VITAMIN B 12 PO)    Take 1 tablet by mouth daily. DIAZEPAM (VALIUM) 10 MG TABLET    take 1 tablet by oral route  every bedtime    INSULIN ASPART (NOVOLOG FLEXPEN) 100 UNIT/ML INJECTION PEN    Continue with your current aspart insulin regimen (fairly complex aspart system which is a combination of SSI, carb counting, scheduled post-meal insulin, and adjustments based on her level of nausea at the time - about 40u/day). INSULIN GLARGINE (BASAGLAR KWIKPEN SC)    Inject 22 Units into the skin 2 times daily Before breakfast and dinner    INSULIN GLARGINE (BASAGLAR KWIKPEN) 100 UNIT/ML INJECTION PEN    Inject into the skin nightly    KETOROLAC (ACULAR LS) 0.4 % SOLN OPHTHALMIC SOLUTION    1 drop daily. L-LYSINE 500 MG CAPS    Take  by mouth 4 times daily as needed. LACTULOSE (CHRONULAC) 10 GM/15ML SOLUTION    Take 20 g by mouth daily. MEDROXYPROGESTERONE (PROVERA) 2.5 MG TABLET    Take 10 mg by mouth daily. METHOCARBAMOL (ROBAXIN) 500 MG TABLET    TAKE ONE TABLET BY MOUTH TWICE A DAY    MISC. DEVICES (WALKER) MISC    1 each by Does not apply route daily    MONTELUKAST (SINGULAIR) 10 MG TABLET    Take 10 mg by mouth nightly. OLOPATADINE (PATANOL) 0.1 % OPHTHALMIC SOLUTION    1 drop 2 times daily. SERTRALINE (ZOLOFT) 100 MG TABLET    Take 200 mg by mouth nightly. TIMOLOL (BETIMOL) 0.25 % OPHTHALMIC SOLUTION    1-2 drops 2 times daily       ALLERGIES     Adhesive tape; Ambien [zolpidem]; Codeine; Flurosyn [fluocinolone acetonide];  Morphine; and Tegaderm ag mesh [silver]    FAMILY HISTORY       Family History   Problem Relation Age of Onset    Diabetes Father    Ashley Response: Obeys commands  Naomi Coma Scale Score: 15          PHYSICAL EXAM    (up to 7 for level 4, 8 or more for level 5)     ED Triage Vitals   BP Temp Temp src Pulse Resp SpO2 Height Weight   -- -- -- -- -- -- -- --       Physical Exam  Vitals signs and nursing note reviewed. Constitutional:       General: She is not in acute distress. Appearance: Normal appearance. HENT:      Head: Normocephalic and atraumatic. Nose: Nose normal. No congestion. Mouth/Throat:      Mouth: Mucous membranes are moist.   Eyes:      Conjunctiva/sclera: Conjunctivae normal.   Neck:      Musculoskeletal: Normal range of motion and neck supple. Muscular tenderness present. No spinous process tenderness. Cardiovascular:      Rate and Rhythm: Normal rate and regular rhythm. Pulses: Normal pulses. Heart sounds: Normal heart sounds. No murmur. Pulmonary:      Effort: Pulmonary effort is normal. No respiratory distress. Breath sounds: Normal breath sounds. Abdominal:      General: There is no distension. Palpations: Abdomen is soft. Tenderness: There is no abdominal tenderness. Musculoskeletal:      Left ankle: She exhibits swelling. She exhibits no ecchymosis, no deformity and normal pulse. Tenderness. AITFL, head of 5th metatarsal and proximal fibula tenderness found. Cervical back: She exhibits tenderness. She exhibits no bony tenderness. Thoracic back: She exhibits tenderness. She exhibits no bony tenderness. Lumbar back: She exhibits tenderness. She exhibits no bony tenderness. Comments: Patient has diffuse pain to palpation. No obvious deformity or step-offs noted. Patient remains neurovascularly intact. Skin:     General: Skin is warm and dry. Neurological:      General: No focal deficit present. Mental Status: She is alert and oriented to person, place, and time.          DIAGNOSTIC RESULTS     EKG: All EKG's are interpreted by the Emergency Department understanding and is amenable with plan. CONSULTS:  None    PROCEDURES:  Unless otherwise noted below, none     Procedures      FINAL IMPRESSION      1. Left leg pain    2. Acute left ankle pain    3. Frequent falls    4. Sprain of left ankle, unspecified ligament, initial encounter    5. Ileus [facility])          DISPOSITION/PLAN   DISPOSITION Decision To Discharge 04/27/2020 04:38:49 PM      PATIENT REFERRED TO:  Rodney Ariza MD  [address]  [phone]    Schedule an appointment as soon as possible for a visit         DISCHARGE MEDICATIONS:  New Prescriptions    No medications on file     Controlled Substances Monitoring:     No flowsheet data found.     (Please note that portions of this note were completed with a voice recognition program.  Efforts were made to edit the dictations but occasionally words are mis-transcribed.)    Lorenzo Sarmiento MD (electronically signed)  Attending Emergency Physician            Pierre Duque MD  04/27/20 2341

## 2020-06-26 ENCOUNTER — HOSPITAL ENCOUNTER (EMERGENCY)
Age: 60
Discharge: HOME | End: 2020-06-26
Attending: EMERGENCY MEDICINE
Payer: COMMERCIAL

## 2020-06-26 ENCOUNTER — APPOINTMENT (OUTPATIENT)
Dept: GENERAL RADIOLOGY | Age: 60
End: 2020-06-26
Payer: COMMERCIAL

## 2020-06-26 VITALS
HEART RATE: 91 BPM | RESPIRATION RATE: 16 BRPM | BODY MASS INDEX: 50.02 KG/M2 | OXYGEN SATURATION: 99 % | DIASTOLIC BLOOD PRESSURE: 82 MMHG | WEIGHT: 293 LBS | SYSTOLIC BLOOD PRESSURE: 136 MMHG | TEMPERATURE: 98.8 F | HEIGHT: 64 IN

## 2020-06-26 LAB
A/G RATIO: 1.6 (ref 1.1–2.2)
ALBUMIN SERPL-MCNC: 3.7 G/DL (ref 3.4–5)
ALP BLD-CCNC: 75 U/L (ref 40–129)
ALT SERPL-CCNC: 9 U/L (ref 10–40)
ANION GAP SERPL CALCULATED.3IONS-SCNC: 11 MMOL/L (ref 3–16)
AST SERPL-CCNC: 11 U/L (ref 15–37)
BACTERIA: ABNORMAL /HPF
BASOPHILS ABSOLUTE: 0.1 K/UL (ref 0–0.2)
BASOPHILS RELATIVE PERCENT: 0.8 %
BILIRUB SERPL-MCNC: <0.2 MG/DL (ref 0–1)
BILIRUBIN URINE: NEGATIVE
BLOOD, URINE: NEGATIVE
BUN BLDV-MCNC: 33 MG/DL (ref 7–20)
CALCIUM SERPL-MCNC: 9.2 MG/DL (ref 8.3–10.6)
CHLORIDE BLD-SCNC: 105 MMOL/L (ref 99–110)
CLARITY: CLEAR
CO2: 23 MMOL/L (ref 21–32)
COLOR: YELLOW
CREAT SERPL-MCNC: 1.6 MG/DL (ref 0.6–1.2)
EKG ATRIAL RATE: 94 BPM
EKG DIAGNOSIS: NORMAL
EKG P AXIS: 47 DEGREES
EKG P-R INTERVAL: 150 MS
EKG Q-T INTERVAL: 358 MS
EKG QRS DURATION: 84 MS
EKG QTC CALCULATION (BAZETT): 447 MS
EKG R AXIS: -23 DEGREES
EKG T AXIS: 56 DEGREES
EKG VENTRICULAR RATE: 94 BPM
EOSINOPHILS ABSOLUTE: 0.2 K/UL (ref 0–0.6)
EOSINOPHILS RELATIVE PERCENT: 2.9 %
EPITHELIAL CELLS, UA: ABNORMAL /HPF (ref 0–5)
GFR AFRICAN AMERICAN: 40
GFR NON-AFRICAN AMERICAN: 33
GLOBULIN: 2.3 G/DL
GLUCOSE BLD-MCNC: 104 MG/DL (ref 70–99)
GLUCOSE BLD-MCNC: 134 MG/DL (ref 70–99)
GLUCOSE URINE: 250 MG/DL
HCT VFR BLD CALC: 32.8 % (ref 36–48)
HEMOGLOBIN: 10.8 G/DL (ref 12–16)
KETONES, URINE: NEGATIVE MG/DL
LEUKOCYTE ESTERASE, URINE: ABNORMAL
LYMPHOCYTES ABSOLUTE: 1.3 K/UL (ref 1–5.1)
LYMPHOCYTES RELATIVE PERCENT: 17.3 %
MAGNESIUM: 1.6 MG/DL (ref 1.8–2.4)
MCH RBC QN AUTO: 32.7 PG (ref 26–34)
MCHC RBC AUTO-ENTMCNC: 32.9 G/DL (ref 31–36)
MCV RBC AUTO: 99.4 FL (ref 80–100)
MICROSCOPIC EXAMINATION: YES
MONOCYTES ABSOLUTE: 0.5 K/UL (ref 0–1.3)
MONOCYTES RELATIVE PERCENT: 6.4 %
NEUTROPHILS ABSOLUTE: 5.6 K/UL (ref 1.7–7.7)
NEUTROPHILS RELATIVE PERCENT: 72.6 %
NITRITE, URINE: NEGATIVE
PDW BLD-RTO: 13.3 % (ref 12.4–15.4)
PERFORMED ON: ABNORMAL
PH UA: 5.5 (ref 5–8)
PHOSPHORUS: 3.2 MG/DL (ref 2.5–4.9)
PLATELET # BLD: 219 K/UL (ref 135–450)
PMV BLD AUTO: 9.9 FL (ref 5–10.5)
POTASSIUM REFLEX MAGNESIUM: 3.7 MMOL/L (ref 3.5–5.1)
PRO-BNP: 363 PG/ML (ref 0–124)
PROTEIN UA: NEGATIVE MG/DL
RBC # BLD: 3.3 M/UL (ref 4–5.2)
RBC UA: ABNORMAL /HPF (ref 0–4)
SODIUM BLD-SCNC: 139 MMOL/L (ref 136–145)
SPECIFIC GRAVITY UA: 1.01 (ref 1–1.03)
TOTAL PROTEIN: 6 G/DL (ref 6.4–8.2)
TROPONIN: <0.01 NG/ML
URINE CULTURE, ROUTINE: NORMAL
URINE REFLEX TO CULTURE: YES
URINE TYPE: ABNORMAL
UROBILINOGEN, URINE: 0.2 E.U./DL
WBC # BLD: 7.7 K/UL (ref 4–11)
WBC UA: ABNORMAL /HPF (ref 0–5)

## 2020-06-26 PROCEDURE — 96366 THER/PROPH/DIAG IV INF ADDON: CPT

## 2020-06-26 PROCEDURE — 96375 TX/PRO/DX INJ NEW DRUG ADDON: CPT

## 2020-06-26 PROCEDURE — 93010 ELECTROCARDIOGRAM REPORT: CPT | Performed by: INTERNAL MEDICINE

## 2020-06-26 PROCEDURE — 83735 ASSAY OF MAGNESIUM: CPT

## 2020-06-26 PROCEDURE — 81001 URINALYSIS AUTO W/SCOPE: CPT

## 2020-06-26 PROCEDURE — 80053 COMPREHEN METABOLIC PANEL: CPT

## 2020-06-26 PROCEDURE — 85025 COMPLETE CBC W/AUTO DIFF WBC: CPT

## 2020-06-26 PROCEDURE — 99284 EMERGENCY DEPT VISIT MOD MDM: CPT

## 2020-06-26 PROCEDURE — 6360000002 HC RX W HCPCS: Performed by: EMERGENCY MEDICINE

## 2020-06-26 PROCEDURE — 71045 X-RAY EXAM CHEST 1 VIEW: CPT

## 2020-06-26 PROCEDURE — 83880 ASSAY OF NATRIURETIC PEPTIDE: CPT

## 2020-06-26 PROCEDURE — 2580000003 HC RX 258: Performed by: EMERGENCY MEDICINE

## 2020-06-26 PROCEDURE — 6360000002 HC RX W HCPCS

## 2020-06-26 PROCEDURE — 93005 ELECTROCARDIOGRAM TRACING: CPT | Performed by: EMERGENCY MEDICINE

## 2020-06-26 PROCEDURE — 87086 URINE CULTURE/COLONY COUNT: CPT

## 2020-06-26 PROCEDURE — 84100 ASSAY OF PHOSPHORUS: CPT

## 2020-06-26 PROCEDURE — 84484 ASSAY OF TROPONIN QUANT: CPT

## 2020-06-26 PROCEDURE — 96367 TX/PROPH/DG ADDL SEQ IV INF: CPT

## 2020-06-26 PROCEDURE — 6360000002 HC RX W HCPCS: Performed by: NURSE PRACTITIONER

## 2020-06-26 PROCEDURE — 96365 THER/PROPH/DIAG IV INF INIT: CPT

## 2020-06-26 RX ORDER — MAGNESIUM SULFATE IN WATER 40 MG/ML
2 INJECTION, SOLUTION INTRAVENOUS ONCE
Status: COMPLETED | OUTPATIENT
Start: 2020-06-26 | End: 2020-06-26

## 2020-06-26 RX ORDER — ONDANSETRON 2 MG/ML
4 INJECTION INTRAMUSCULAR; INTRAVENOUS ONCE
Status: COMPLETED | OUTPATIENT
Start: 2020-06-26 | End: 2020-06-26

## 2020-06-26 RX ORDER — MAGNESIUM SULFATE 1 G/100ML
1 INJECTION INTRAVENOUS ONCE
Status: DISCONTINUED | OUTPATIENT
Start: 2020-06-26 | End: 2020-06-26

## 2020-06-26 RX ORDER — ONDANSETRON 2 MG/ML
INJECTION INTRAMUSCULAR; INTRAVENOUS
Status: COMPLETED
Start: 2020-06-26 | End: 2020-06-26

## 2020-06-26 RX ORDER — CEFDINIR 300 MG/1
300 CAPSULE ORAL 2 TIMES DAILY
Qty: 14 CAPSULE | Refills: 0 | Status: SHIPPED | OUTPATIENT
Start: 2020-06-26 | End: 2020-07-03

## 2020-06-26 RX ADMIN — HYDROMORPHONE HYDROCHLORIDE 1 MG: 1 INJECTION, SOLUTION INTRAMUSCULAR; INTRAVENOUS; SUBCUTANEOUS at 01:24

## 2020-06-26 RX ADMIN — MAGNESIUM SULFATE IN WATER 2 G: 40 INJECTION, SOLUTION INTRAVENOUS at 03:45

## 2020-06-26 RX ADMIN — ONDANSETRON 4 MG: 2 INJECTION INTRAMUSCULAR; INTRAVENOUS at 01:23

## 2020-06-26 RX ADMIN — CEFTRIAXONE SODIUM 1 G: 1 INJECTION, POWDER, FOR SOLUTION INTRAMUSCULAR; INTRAVENOUS at 02:45

## 2020-06-26 ASSESSMENT — PAIN DESCRIPTION - PAIN TYPE: TYPE: ACUTE PAIN

## 2020-06-26 ASSESSMENT — PAIN SCALES - GENERAL
PAINLEVEL_OUTOF10: 10
PAINLEVEL_OUTOF10: 5
PAINLEVEL_OUTOF10: 3

## 2020-06-26 NOTE — ED PROVIDER NOTES
intervals, heart rate 94, no significant change from old EKG and this EKG tonight shows No significant ST elevation or depression. RADIOLOGY  X-RAYS:  I have reviewed radiologic plain film image(s). ALL OTHER NON-PLAIN FILM IMAGES SUCH AS CT, ULTRASOUND AND MRI HAVE BEEN READ BY THE RADIOLOGIST. XR CHEST PORTABLE   Final Result   Shallow inspiratory chest radiograph without evidence of acute disease. Right IJ chest port. ED COURSE/MDM  Patient seen and evaluated. Patient has a right IJ chest port due to being a hard peripheral access. Here it is noted that she has a UTI was a little concerned about her frequent falls and to discuss with her about being admitted for possible acute rehab and she may need acute rehab placement. She is not wanting to do this she wants to go back home and she says \"I am relieved at least I know what it is since I have an infection\". I told her that considering her significant medical issues her weakness getting worse may be due to a myriad of other problems. She verbalized understanding but does not want to be admitted and says she can have someone come pick her up later today from the ER. We will give her a dose of antibiotics here as well as replete her magnesium and will give her prescription for home. She can follow-up as an outpatient. All diagnostic tests reviewed and results discussed with patient. Plan of care discussed with patient. Patient in agreement with plan. CLINICAL IMPRESSION  1. Acute UTI    2. Hypomagnesemia    3. Generalized weakness        Blood pressure 138/61, pulse 83, temperature 98.8 °F (37.1 °C), temperature source Oral, resp. rate 16, height 5' 4\" (1.626 m), weight (!) 361 lb (163.7 kg), SpO2 93 %. DISPOSITION  Elle Adamson was discharged to home in stable condition.      This chart was generated in part by using Dragon Dictation system and may contain errors related to that system including errors in grammar, punctuation, and spelling, as well as words and phrases that may be inappropriate. When dictating, effort is made to correct spelling/grammar errors. If there are any questions or concerns please feel free to contact the dictating provider for clarification.      Howie Payne DO  ATTENDING, [address] [facility], DO  06/26/20 [id number]

## 2020-07-31 ENCOUNTER — APPOINTMENT (OUTPATIENT)
Dept: GENERAL RADIOLOGY | Age: 60
End: 2020-07-31
Payer: COMMERCIAL

## 2020-07-31 ENCOUNTER — HOSPITAL ENCOUNTER (EMERGENCY)
Age: 60
Discharge: HOME | End: 2020-07-31
Payer: COMMERCIAL

## 2020-07-31 VITALS
RESPIRATION RATE: 18 BRPM | SYSTOLIC BLOOD PRESSURE: 186 MMHG | OXYGEN SATURATION: 100 % | TEMPERATURE: 98.7 F | WEIGHT: 293 LBS | BODY MASS INDEX: 58.36 KG/M2 | DIASTOLIC BLOOD PRESSURE: 52 MMHG | HEART RATE: 83 BPM

## 2020-07-31 PROCEDURE — 73030 X-RAY EXAM OF SHOULDER: CPT

## 2020-07-31 PROCEDURE — 6370000000 HC RX 637 (ALT 250 FOR IP): Performed by: NURSE PRACTITIONER

## 2020-07-31 PROCEDURE — 73502 X-RAY EXAM HIP UNI 2-3 VIEWS: CPT

## 2020-07-31 PROCEDURE — 73610 X-RAY EXAM OF ANKLE: CPT

## 2020-07-31 PROCEDURE — 99283 EMERGENCY DEPT VISIT LOW MDM: CPT

## 2020-07-31 PROCEDURE — 73110 X-RAY EXAM OF WRIST: CPT

## 2020-07-31 RX ORDER — METHOCARBAMOL 750 MG/1
750 TABLET, FILM COATED ORAL ONCE
Status: COMPLETED | OUTPATIENT
Start: 2020-07-31 | End: 2020-07-31

## 2020-07-31 RX ORDER — HYDROCODONE BITARTRATE AND ACETAMINOPHEN 5; 325 MG/1; MG/1
1 TABLET ORAL EVERY 6 HOURS PRN
Qty: 5 TABLET | Refills: 0 | Status: SHIPPED | OUTPATIENT
Start: 2020-07-31 | End: 2020-08-03

## 2020-07-31 RX ORDER — OXYCODONE HYDROCHLORIDE AND ACETAMINOPHEN 5; 325 MG/1; MG/1
1 TABLET ORAL ONCE
Status: COMPLETED | OUTPATIENT
Start: 2020-07-31 | End: 2020-07-31

## 2020-07-31 RX ADMIN — METHOCARBAMOL TABLETS 750 MG: 750 TABLET, COATED ORAL at 18:35

## 2020-07-31 RX ADMIN — OXYCODONE HYDROCHLORIDE AND ACETAMINOPHEN 1 TABLET: 5; 325 TABLET ORAL at 18:35

## 2020-07-31 ASSESSMENT — PAIN SCALES - GENERAL
PAINLEVEL_OUTOF10: 10
PAINLEVEL_OUTOF10: 10

## 2020-07-31 NOTE — ED PROVIDER NOTES
treatments    Retinopathy of both eyes     Sleep apnea     can't use a cpap    Ulcer, stomach peptic     x 3    Unspecified cerebral artery occlusion with cerebral infarction     \"silent stroke\"    Unspecified diseases of blood and blood-forming organs     bled for 1 year straight at age 4     Past Surgical History:   Procedure Laterality Date    ABDOMINAL EXPLORATION SURGERY  13    EXPLORATORY LAPAROTOMY LYSIS OF ADHESIONS    APPENDECTOMY      CARPAL TUNNEL RELEASE      both hands    CATARACT REMOVAL WITH IMPLANT  11    right    CATARACT REMOVAL WITH IMPLANT  2011    left eye     SECTION      COLONOSCOPY      EYE SURGERY      laser, vitrectomy ou, cataract ou    HYSTERECTOMY      partial    JOINT REPLACEMENT      both knees    OTHER SURGICAL HISTORY  8/15/11    Left Shoulder Decompression    SHOULDER SURGERY      right     SUBTOTAL COLECTOMY  2000    TONSILLECTOMY      TUNNELED VENOUS PORT PLACEMENT  11    TUNNELED VENOUS PORT PLACEMENT  3/1/13    Devonte Bustillos     Family History   Problem Relation Age of Onset    Diabetes Father     Heart Disease Father         mi    High Blood Pressure Father     High Cholesterol Father     Diabetes Paternal Aunt     Heart Disease Paternal Aunt     High Blood Pressure Paternal Aunt     High Cholesterol Paternal Aunt     Cancer Paternal Aunt         ovarian    Mult Sclerosis Paternal Aunt     Dementia Mother     High Blood Pressure Mother     Heart Disease Maternal Grandmother     Heart Disease Paternal Grandmother     Diabetes Paternal Uncle     Heart Disease Paternal Uncle     High Blood Pressure Paternal Uncle     High Cholesterol Paternal Uncle     Cancer Paternal Uncle     Cancer Maternal Grandfather         lung     Social History     Socioeconomic History    Marital status:      Spouse name: Not on file    Number of children: Not on file    Years of education: Not on file   Ishaan Koehler education level: Not on file   Occupational History    Not on file   Social Needs    Financial resource strain: Not on file    Food insecurity     Worry: Not on file     Inability: Not on file    Transportation needs     Medical: Not on file     Non-medical: Not on file   Tobacco Use    Smoking status: Never Smoker    Smokeless tobacco: Never Used   Substance and Sexual Activity    Alcohol use: No    Drug use: No    Sexual activity: Never   Lifestyle    Physical activity     Days per week: Not on file     Minutes per session: Not on file    Stress: Not on file   Relationships    Social connections     Talks on phone: Not on file     Gets together: Not on file     Attends Christianity service: Not on file     Active member of club or organization: Not on file     Attends meetings of clubs or organizations: Not on file     Relationship status: Not on file    Intimate partner violence     Fear of current or ex partner: Not on file     Emotionally abused: Not on file     Physically abused: Not on file     Forced sexual activity: Not on file   Other Topics Concern    Not on file   Social History Narrative    Not on file     No current facility-administered medications for this encounter. Current Outpatient Medications   Medication Sig Dispense Refill    HYDROcodone-acetaminophen (NORCO) 5-325 MG per tablet Take 1 tablet by mouth every 6 hours as needed for Pain for up to 3 days. 5 tablet 0    insulin glargine (BASAGLAR KWIKPEN) 100 UNIT/ML injection pen Inject into the skin nightly      Insulin Glargine (BASAGLAR KWIKPEN SC) Inject 22 Units into the skin 2 times daily Before breakfast and dinner      methocarbamol (ROBAXIN) 500 MG tablet TAKE ONE TABLET BY MOUTH TWICE A DAY 60 tablet 0    diazepam (VALIUM) 10 MG tablet take 1 tablet by oral route  every bedtime      atorvastatin (LIPITOR) 40 MG tablet Take 40 mg by mouth      Misc.  Devices (WALKER) MISC 1 each by Does not apply route daily 1 each 0    insulin aspart (NOVOLOG FLEXPEN) 100 UNIT/ML injection pen Continue with your current aspart insulin regimen (fairly complex aspart system which is a combination of SSI, carb counting, scheduled post-meal insulin, and adjustments based on her level of nausea at the time - about 40u/day). (Patient taking differently: Continue with your current aspart insulin regimen (fairly complex aspart system which is a combination of SSI, carb counting, scheduled post-meal insulin, and adjustments based on her level of nausea at the time - about 40u/day). 1 unit per 8 carbs and 1 unit for every 12 points but pt is unsure what her \"high\" number is.) 5 Pen 3    timolol (BETIMOL) 0.25 % ophthalmic solution 1-2 drops 2 times daily      Cholecalciferol (VITAMIN D3) 2000 UNITS CAPS Take  by mouth daily.  Cyanocobalamin (VITAMIN B 12 PO) Take 1 tablet by mouth daily.  L-Lysine 500 MG CAPS Take  by mouth 4 times daily as needed.  sertraline (ZOLOFT) 100 MG tablet Take 200 mg by mouth nightly.  lactulose (CHRONULAC) 10 GM/15ML solution Take 20 g by mouth daily.  ketorolac (ACULAR LS) 0.4 % SOLN ophthalmic solution 1 drop daily.  olopatadine (PATANOL) 0.1 % ophthalmic solution 1 drop 2 times daily.  medroxyPROGESTERone (PROVERA) 2.5 MG tablet Take 10 mg by mouth daily.  montelukast (SINGULAIR) 10 MG tablet Take 10 mg by mouth nightly.        Allergies   Allergen Reactions    Adhesive Tape Dermatitis    Ambien [Zolpidem]      Made me wild    Codeine      Can take a little just not a lot at a time    Flurosyn [Fluocinolone Acetonide]      \"flurosceen eye dye\" severe HA, fainting    Morphine      Went bezerk    Tegaderm Ag Mesh [Silver] Dermatitis     All types of tegaderm and adhesives       Review of Systems  6 systems reviewed, pertinent positives per HPI otherwise noted to be negative      PHYSICAL EXAM  Vitals:    07/31/20 1754   BP: (!) 186/52   Pulse: 83   Resp: 18   Temp: 98.7 °F (37.1 °C)   SpO2: 100%       GENERAL APPEARANCE: Well developed, morbidly obese. Awake and alert. Cooperative. Observed resting in bed. No acute distress. HEAD: Normocephalic. Atraumatic. No raccoon's eyes or marquis's sign observed. EYES: Sclera is non-icteric. Conjunctiva normal. PERRL. EOMI.  ENT: External ears are normal. Bilateral TM are transparent, intact, bony landmarks are well visualized. Good cone of light reflex. There is no TM perforation. There is no drainage or hemotympanum observed. Ear canal is without swelling or erythema. Nose is without epistaxis. There is no septal deviation or septal hematoma to observation. Mucous membranes are moist. Uvula is midline and without edema. Posterior oropharynx is without edema, erythema or exudate. NECK: Supple. Normal ROM. Trachea mid-line. No cervical spine tenderness to palpation. Cardiac: Regular rate and rhythm. Capillary refill is brisk in bilateral upper extremities. Normal S1-S2 sounds. No murmurs, rubs, or gallops. LUNGS: Breathing is unlabored. Speaking comfortably in full sentences. Equal and symmetric chest rise. Speaking comfortably in full sentences. Lungs are clear bilaterally to auscultation. Without wheezing, rales, or rhonchi. No obvious bruising, trauma or deformities. Abdomen: Soft, nontender, nondistended with positive bowel sounds. No rebound tenderness, guarding or any peritoneal signs. No masses or hepatosplenomegaly. No obvious bruising, trauma or deformities. Musculoskeletal: Tenderness palpation of the left shoulder, left wrist, left hip, left ankle. Left upper and lower extremities distally neurovascularly intact. Range of motion slightly limited to the above joints due to pain. No gross deformities or trauma noted. Moving all extremities equally and appropriately. NEUROLOGICAL: Alert and oriented x3. CN II through XII are intact. Strength is normal. No focal motor or sensory deficits. SKIN: Warm and dry.  Skin is with fracture or dislocation. Ossicle distal to the tip of the lateral malleolus most consistent with remote injury. Diffuse soft tissue swelling. Xr Ankle Left (min 3 Views)    Result Date: 7/31/2020  EXAMINATION: TWO XRAY VIEWS OF THE LEFT HIP; THREE XRAY VIEWS OF THE LEFT ANKLE 7/31/2020 6:30 pm COMPARISON: None. HISTORY: ORDERING SYSTEM PROVIDED HISTORY: Injury TECHNOLOGIST PROVIDED HISTORY: Reason for exam:->Injury Reason for Exam: injury Acuity: Acute Type of Exam: Initial; ORDERING SYSTEM PROVIDED HISTORY: injury TECHNOLOGIST PROVIDED HISTORY: Reason for exam:->injury Reason for Exam: injury Acuity: Acute Type of Exam: Initial FINDINGS: AP pelvis and left hip: Bone detail is limited by patient's body habitus. No acute fracture is seen. The SI joints are maintained. No acute osseous abnormality of the hips bilaterally. No focal soft tissue abnormality. Left ankle, three views: No acute fracture or dislocation. The ankle mortise and talar dome are maintained. Ossicle distal to the tip of the lateral malleolus, most consistent with remote injury. Plantar spurs are noted. Diffuse prominence of the soft tissues about the ankle. Scattered vascular calcification. No acute osseous abnormality of the pelvis or left hip. Given the limitations due to the patient's body habitus, consider cross-sectional imaging follow-up if there is continued clinical concern. No acute fracture or dislocation. Ossicle distal to the tip of the lateral malleolus most consistent with remote injury. Diffuse soft tissue swelling. Xr Shoulder Left (min 2 Views)    Result Date: 7/31/2020  EXAMINATION: THREE XRAY VIEWS OF THE LEFT SHOULDER 7/31/2020 6:30 pm COMPARISON: 07/04/2017 HISTORY: ORDERING SYSTEM PROVIDED HISTORY: Injury TECHNOLOGIST PROVIDED HISTORY: Reason for exam:->Injury Reason for Exam: injury Acuity: Acute Type of Exam: Initial FINDINGS: There is stable widening of the acromioclavicular distance.   It measures 1. 8 cm. Glenohumeral joint alignment is normal.  Soft tissues are unremarkable. No fracture is seen. No acute osseous injury of the left shoulder. Chronic widening of the acromioclavicular joint, consistent with remote trauma. ED COURSE/MDM  Patient seen and evaluated. Old records reviewed. Diagnostic testing reviewed and results discussed. I have evaluated this patient. My supervising physician was available for consultation. Sathish Camacho presented to the ED today with above noted complaints. Physical exam did reveal tenderness to palpation of the left shoulder, left wrist, left hip, left ankle. Patient reports a fall and had multiple complaints of pain that seem to be quite extensive for the description of her fall. She did tell nursing staff and then myself that Dilaudid and Zofran as these are the only things that will allow for blood return from her Port-A-Cath. I did give the patient a dose of Percocet here in the ED to help with pain control. X-rays of the left shoulder, wrist, hip, ankle were all obtained and without acute findings. Patient denied that she hit her head during the fall. I feel that as the patient has no acute findings on her imaging that she can be discharged. I do feel the patient is somewhat drug-seeking as she was telling myself and the nursing staff about Dilaudid and Zofran allowing for her port to have a blood return, she was also asking the nurse who is caring for her to access port just to give her a dose of Dilaudid. I advised the patient that this would not be occurring at this visit. Patient does have prescriptions for muscle relaxers at home.     While in ED patient received   Medications   oxyCODONE-acetaminophen (PERCOCET) 5-325 MG per tablet 1 tablet (1 tablet Oral Given 7/31/20 1835)   methocarbamol (ROBAXIN) tablet 750 mg (750 mg Oral Given 7/31/20 1835)       At this point I do not feel the patient requires further work up and it is reasonable to discharge the patient. Please refer to AVS for further details regarding discharge instructions. A discussion was had with the patient regarding diagnosis, diagnostic testing results, treatment/ plan of care, and follow up. All questions were answered. Patient will follow up as directed for further evaluation/treatment. The patient was given strict return precautions as we discussed symptoms that would necessitate return to the ED. Patient will return to ED for new/worsening symptoms. The patient verbalized their understanding and agreement with the above plan. I estimate there is LOW risk for ABDOMINAL AORTIC ANEURYSM, CAUDA EQUINA or CENTRAL CORD SYNDROME, COMPARTMENT SYNDROME, EPIDURAL MASS LESION, HERNIATED DISK CAUSING SEVERE STENOSIS, INTRACRANIAL HEMORRHAGE, INTRA-ABDOMINAL INJURY, PERFORATED BOWEL, SUBDURAL HEMATOMA, TENDON or NEUROVASCULAR INJURY, or a THORACIC AORTIC DISSECTION, thus I consider the discharge disposition reasonable. Also, there is no evidence or peritonitis, sepsis, or toxicity. Shannan Thomas and I have discussed the diagnosis and risks, and we agree with discharging home to follow-up with their primary doctor. We also discussed returning to the Emergency Department immediately if new or worsening symptoms occur. We have discussed the symptoms which are most concerning (e.g., bloody stool, fever, changing or worsening pain, vomiting) that necessitate immediate return. Clinical Impression    1. Fall, initial encounter    2. Acute pain of left shoulder    3. Acute pain of left wrist    4. Acute hip pain, left    5. Acute left ankle pain        Blood pressure (!) 186/52, pulse 83, temperature 98.7 °F (37.1 °C), temperature source Oral, resp. rate 18, weight (!) 340 lb (154.2 kg), SpO2 100 %. Patient was sent home with a prescription for below medication/s. I did [place] patient on appropriate use of these medication.   New Prescriptions

## 2020-08-01 NOTE — ED NOTES
During stay patient kept making multiple comments that patient needed flushed with Diluadid to get blood out of port. Stated that blood work wasn't necessary and patient kept trying to state she needed Diluadid. Discussed findings with [facility].       Kem Hughes RN  07/31/20 2019

## 2020-08-07 ENCOUNTER — APPOINTMENT (OUTPATIENT)
Dept: CT IMAGING | Age: 60
End: 2020-08-07
Payer: COMMERCIAL

## 2020-08-07 ENCOUNTER — APPOINTMENT (OUTPATIENT)
Dept: GENERAL RADIOLOGY | Age: 60
End: 2020-08-07
Payer: COMMERCIAL

## 2020-08-07 ENCOUNTER — HOSPITAL ENCOUNTER (OUTPATIENT)
Age: 60
Setting detail: OBSERVATION
Discharge: NURSING, REHAB OR LONG-TERM CARE | End: 2020-08-11
Attending: EMERGENCY MEDICINE | Admitting: INTERNAL MEDICINE
Payer: COMMERCIAL

## 2020-08-07 PROBLEM — R29.6 FREQUENT FALLS: Status: ACTIVE | Noted: 2020-08-07

## 2020-08-07 LAB
A/G RATIO: 1.6 (ref 1.1–2.2)
ALBUMIN SERPL-MCNC: 3.9 G/DL (ref 3.4–5)
ALP BLD-CCNC: 76 U/L (ref 40–129)
ALT SERPL-CCNC: 9 U/L (ref 10–40)
ANION GAP SERPL CALCULATED.3IONS-SCNC: 11 MMOL/L (ref 3–16)
AST SERPL-CCNC: 10 U/L (ref 15–37)
BASOPHILS ABSOLUTE: 0 K/UL (ref 0–0.2)
BASOPHILS RELATIVE PERCENT: 0.4 %
BILIRUB SERPL-MCNC: <0.2 MG/DL (ref 0–1)
BUN BLDV-MCNC: 43 MG/DL (ref 7–20)
CALCIUM SERPL-MCNC: 9.5 MG/DL (ref 8.3–10.6)
CHLORIDE BLD-SCNC: 104 MMOL/L (ref 99–110)
CO2: 24 MMOL/L (ref 21–32)
CREAT SERPL-MCNC: 1.7 MG/DL (ref 0.6–1.2)
EOSINOPHILS ABSOLUTE: 0.1 K/UL (ref 0–0.6)
EOSINOPHILS RELATIVE PERCENT: 1.6 %
GFR AFRICAN AMERICAN: 37
GFR NON-AFRICAN AMERICAN: 31
GLOBULIN: 2.5 G/DL
GLUCOSE BLD-MCNC: 267 MG/DL (ref 70–99)
HCT VFR BLD CALC: 32.9 % (ref 36–48)
HEMOGLOBIN: 10.8 G/DL (ref 12–16)
LYMPHOCYTES ABSOLUTE: 0.9 K/UL (ref 1–5.1)
LYMPHOCYTES RELATIVE PERCENT: 10.6 %
MCH RBC QN AUTO: 32.9 PG (ref 26–34)
MCHC RBC AUTO-ENTMCNC: 32.7 G/DL (ref 31–36)
MCV RBC AUTO: 100.6 FL (ref 80–100)
MONOCYTES ABSOLUTE: 0.6 K/UL (ref 0–1.3)
MONOCYTES RELATIVE PERCENT: 6.6 %
NEUTROPHILS ABSOLUTE: 7 K/UL (ref 1.7–7.7)
NEUTROPHILS RELATIVE PERCENT: 80.8 %
PDW BLD-RTO: 13.3 % (ref 12.4–15.4)
PLATELET # BLD: 234 K/UL (ref 135–450)
PMV BLD AUTO: 10.1 FL (ref 5–10.5)
POTASSIUM SERPL-SCNC: 4.9 MMOL/L (ref 3.5–5.1)
RBC # BLD: 3.27 M/UL (ref 4–5.2)
SODIUM BLD-SCNC: 139 MMOL/L (ref 136–145)
TOTAL PROTEIN: 6.4 G/DL (ref 6.4–8.2)
WBC # BLD: 8.6 K/UL (ref 4–11)

## 2020-08-07 PROCEDURE — 85025 COMPLETE CBC W/AUTO DIFF WBC: CPT

## 2020-08-07 PROCEDURE — 6360000002 HC RX W HCPCS: Performed by: NURSE PRACTITIONER

## 2020-08-07 PROCEDURE — 73610 X-RAY EXAM OF ANKLE: CPT

## 2020-08-07 PROCEDURE — G0378 HOSPITAL OBSERVATION PER HR: HCPCS

## 2020-08-07 PROCEDURE — 73502 X-RAY EXAM HIP UNI 2-3 VIEWS: CPT

## 2020-08-07 PROCEDURE — 80053 COMPREHEN METABOLIC PANEL: CPT

## 2020-08-07 PROCEDURE — 73030 X-RAY EXAM OF SHOULDER: CPT

## 2020-08-07 PROCEDURE — 96374 THER/PROPH/DIAG INJ IV PUSH: CPT

## 2020-08-07 PROCEDURE — 70450 CT HEAD/BRAIN W/O DYE: CPT

## 2020-08-07 PROCEDURE — 36415 COLL VENOUS BLD VENIPUNCTURE: CPT

## 2020-08-07 PROCEDURE — 72192 CT PELVIS W/O DYE: CPT

## 2020-08-07 PROCEDURE — 96375 TX/PRO/DX INJ NEW DRUG ADDON: CPT

## 2020-08-07 PROCEDURE — 72125 CT NECK SPINE W/O DYE: CPT

## 2020-08-07 PROCEDURE — 99285 EMERGENCY DEPT VISIT HI MDM: CPT

## 2020-08-07 RX ORDER — ACETAMINOPHEN 325 MG/1
650 TABLET ORAL EVERY 6 HOURS PRN
Status: DISCONTINUED | OUTPATIENT
Start: 2020-08-07 | End: 2020-08-11 | Stop reason: HOSPADM

## 2020-08-07 RX ORDER — SODIUM CHLORIDE 0.9 % (FLUSH) 0.9 %
10 SYRINGE (ML) INJECTION PRN
Status: DISCONTINUED | OUTPATIENT
Start: 2020-08-07 | End: 2020-08-11 | Stop reason: HOSPADM

## 2020-08-07 RX ORDER — METHOCARBAMOL 500 MG/1
500 TABLET, FILM COATED ORAL 2 TIMES DAILY
Status: DISCONTINUED | OUTPATIENT
Start: 2020-08-07 | End: 2020-08-11 | Stop reason: HOSPADM

## 2020-08-07 RX ORDER — ONDANSETRON 2 MG/ML
4 INJECTION INTRAMUSCULAR; INTRAVENOUS EVERY 6 HOURS PRN
Status: DISCONTINUED | OUTPATIENT
Start: 2020-08-07 | End: 2020-08-11 | Stop reason: HOSPADM

## 2020-08-07 RX ORDER — INSULIN GLARGINE 100 [IU]/ML
22 INJECTION, SOLUTION SUBCUTANEOUS 2 TIMES DAILY
Status: DISCONTINUED | OUTPATIENT
Start: 2020-08-07 | End: 2020-08-09

## 2020-08-07 RX ORDER — SODIUM CHLORIDE 0.9 % (FLUSH) 0.9 %
10 SYRINGE (ML) INJECTION EVERY 12 HOURS SCHEDULED
Status: DISCONTINUED | OUTPATIENT
Start: 2020-08-07 | End: 2020-08-11 | Stop reason: HOSPADM

## 2020-08-07 RX ORDER — ATORVASTATIN CALCIUM 40 MG/1
40 TABLET, FILM COATED ORAL DAILY
Status: DISCONTINUED | OUTPATIENT
Start: 2020-08-08 | End: 2020-08-11 | Stop reason: HOSPADM

## 2020-08-07 RX ORDER — OXYCODONE HYDROCHLORIDE AND ACETAMINOPHEN 5; 325 MG/1; MG/1
1 TABLET ORAL EVERY 6 HOURS PRN
Status: DISCONTINUED | OUTPATIENT
Start: 2020-08-07 | End: 2020-08-11 | Stop reason: HOSPADM

## 2020-08-07 RX ORDER — HEPARIN SODIUM 5000 [USP'U]/ML
5000 INJECTION, SOLUTION INTRAVENOUS; SUBCUTANEOUS EVERY 8 HOURS SCHEDULED
Status: DISCONTINUED | OUTPATIENT
Start: 2020-08-07 | End: 2020-08-11 | Stop reason: HOSPADM

## 2020-08-07 RX ORDER — ONDANSETRON 2 MG/ML
INJECTION INTRAMUSCULAR; INTRAVENOUS
Status: DISPENSED
Start: 2020-08-07 | End: 2020-08-08

## 2020-08-07 RX ORDER — ACETAMINOPHEN 650 MG/1
650 SUPPOSITORY RECTAL EVERY 6 HOURS PRN
Status: DISCONTINUED | OUTPATIENT
Start: 2020-08-07 | End: 2020-08-11 | Stop reason: HOSPADM

## 2020-08-07 RX ORDER — GLIMEPIRIDE 2 MG/1
1 TABLET ORAL 2 TIMES DAILY
Status: DISCONTINUED | OUTPATIENT
Start: 2020-08-07 | End: 2020-08-11 | Stop reason: HOSPADM

## 2020-08-07 RX ORDER — PROMETHAZINE HYDROCHLORIDE 25 MG/1
12.5 TABLET ORAL EVERY 6 HOURS PRN
Status: DISCONTINUED | OUTPATIENT
Start: 2020-08-07 | End: 2020-08-11 | Stop reason: HOSPADM

## 2020-08-07 RX ORDER — ONDANSETRON 2 MG/ML
4 INJECTION INTRAMUSCULAR; INTRAVENOUS ONCE
Status: COMPLETED | OUTPATIENT
Start: 2020-08-07 | End: 2020-08-07

## 2020-08-07 RX ORDER — MONTELUKAST SODIUM 10 MG/1
10 TABLET ORAL NIGHTLY
Status: DISCONTINUED | OUTPATIENT
Start: 2020-08-07 | End: 2020-08-11 | Stop reason: HOSPADM

## 2020-08-07 RX ADMIN — ONDANSETRON 4 MG: 2 INJECTION INTRAMUSCULAR; INTRAVENOUS at 21:50

## 2020-08-07 RX ADMIN — HYDROMORPHONE HYDROCHLORIDE 1 MG: 1 INJECTION, SOLUTION INTRAMUSCULAR; INTRAVENOUS; SUBCUTANEOUS at 21:50

## 2020-08-07 ASSESSMENT — ENCOUNTER SYMPTOMS
SHORTNESS OF BREATH: 0
ABDOMINAL DISTENTION: 0
VOMITING: 0
ABDOMINAL PAIN: 0
ALLERGIC/IMMUNOLOGIC NEGATIVE: 1
BACK PAIN: 0
COUGH: 0
NAUSEA: 0
DIARRHEA: 0

## 2020-08-07 ASSESSMENT — PAIN DESCRIPTION - DESCRIPTORS: DESCRIPTORS: CONSTANT

## 2020-08-07 ASSESSMENT — PAIN DESCRIPTION - PAIN TYPE: TYPE: ACUTE PAIN

## 2020-08-07 ASSESSMENT — PAIN DESCRIPTION - LOCATION: LOCATION: ANKLE

## 2020-08-07 ASSESSMENT — PAIN DESCRIPTION - PROGRESSION: CLINICAL_PROGRESSION: GRADUALLY WORSENING

## 2020-08-07 ASSESSMENT — PAIN SCALES - GENERAL: PAINLEVEL_OUTOF10: 10

## 2020-08-07 ASSESSMENT — PAIN DESCRIPTION - FREQUENCY: FREQUENCY: CONTINUOUS

## 2020-08-07 ASSESSMENT — PAIN DESCRIPTION - ORIENTATION: ORIENTATION: LEFT

## 2020-08-07 ASSESSMENT — PAIN DESCRIPTION - ONSET: ONSET: ON-GOING

## 2020-08-08 LAB
ANION GAP SERPL CALCULATED.3IONS-SCNC: 11 MMOL/L (ref 3–16)
BASOPHILS ABSOLUTE: 0 K/UL (ref 0–0.2)
BASOPHILS RELATIVE PERCENT: 0.5 %
BUN BLDV-MCNC: 41 MG/DL (ref 7–20)
CALCIUM SERPL-MCNC: 9.2 MG/DL (ref 8.3–10.6)
CHLORIDE BLD-SCNC: 104 MMOL/L (ref 99–110)
CO2: 26 MMOL/L (ref 21–32)
CREAT SERPL-MCNC: 1.6 MG/DL (ref 0.6–1.2)
EOSINOPHILS ABSOLUTE: 0.2 K/UL (ref 0–0.6)
EOSINOPHILS RELATIVE PERCENT: 1.9 %
GFR AFRICAN AMERICAN: 40
GFR NON-AFRICAN AMERICAN: 33
GLUCOSE BLD-MCNC: 215 MG/DL (ref 70–99)
GLUCOSE BLD-MCNC: 224 MG/DL (ref 70–99)
GLUCOSE BLD-MCNC: 254 MG/DL (ref 70–99)
GLUCOSE BLD-MCNC: 295 MG/DL (ref 70–99)
GLUCOSE BLD-MCNC: 404 MG/DL (ref 70–99)
HCT VFR BLD CALC: 33.7 % (ref 36–48)
HEMOGLOBIN: 11.2 G/DL (ref 12–16)
LYMPHOCYTES ABSOLUTE: 1.1 K/UL (ref 1–5.1)
LYMPHOCYTES RELATIVE PERCENT: 13.3 %
MCH RBC QN AUTO: 33 PG (ref 26–34)
MCHC RBC AUTO-ENTMCNC: 33.2 G/DL (ref 31–36)
MCV RBC AUTO: 99.5 FL (ref 80–100)
MONOCYTES ABSOLUTE: 0.7 K/UL (ref 0–1.3)
MONOCYTES RELATIVE PERCENT: 9 %
NEUTROPHILS ABSOLUTE: 6.2 K/UL (ref 1.7–7.7)
NEUTROPHILS RELATIVE PERCENT: 75.3 %
PDW BLD-RTO: 13.8 % (ref 12.4–15.4)
PERFORMED ON: ABNORMAL
PLATELET # BLD: 260 K/UL (ref 135–450)
PMV BLD AUTO: 9.6 FL (ref 5–10.5)
POTASSIUM REFLEX MAGNESIUM: 5.1 MMOL/L (ref 3.5–5.1)
RBC # BLD: 3.39 M/UL (ref 4–5.2)
SODIUM BLD-SCNC: 141 MMOL/L (ref 136–145)
WBC # BLD: 8.2 K/UL (ref 4–11)

## 2020-08-08 PROCEDURE — 6370000000 HC RX 637 (ALT 250 FOR IP): Performed by: NURSE PRACTITIONER

## 2020-08-08 PROCEDURE — 2580000003 HC RX 258: Performed by: NURSE PRACTITIONER

## 2020-08-08 PROCEDURE — 97162 PT EVAL MOD COMPLEX 30 MIN: CPT

## 2020-08-08 PROCEDURE — 2500000003 HC RX 250 WO HCPCS: Performed by: NURSE PRACTITIONER

## 2020-08-08 PROCEDURE — 80048 BASIC METABOLIC PNL TOTAL CA: CPT

## 2020-08-08 PROCEDURE — 83036 HEMOGLOBIN GLYCOSYLATED A1C: CPT

## 2020-08-08 PROCEDURE — 36592 COLLECT BLOOD FROM PICC: CPT

## 2020-08-08 PROCEDURE — 97535 SELF CARE MNGMENT TRAINING: CPT

## 2020-08-08 PROCEDURE — 85025 COMPLETE CBC W/AUTO DIFF WBC: CPT

## 2020-08-08 PROCEDURE — 97530 THERAPEUTIC ACTIVITIES: CPT

## 2020-08-08 PROCEDURE — 97166 OT EVAL MOD COMPLEX 45 MIN: CPT

## 2020-08-08 PROCEDURE — G0378 HOSPITAL OBSERVATION PER HR: HCPCS

## 2020-08-08 PROCEDURE — 97110 THERAPEUTIC EXERCISES: CPT

## 2020-08-08 PROCEDURE — 96376 TX/PRO/DX INJ SAME DRUG ADON: CPT

## 2020-08-08 PROCEDURE — 6360000002 HC RX W HCPCS: Performed by: NURSE PRACTITIONER

## 2020-08-08 PROCEDURE — 96372 THER/PROPH/DIAG INJ SC/IM: CPT

## 2020-08-08 RX ORDER — GABAPENTIN 300 MG/1
300 CAPSULE ORAL 3 TIMES DAILY
Status: DISCONTINUED | OUTPATIENT
Start: 2020-08-08 | End: 2020-08-11 | Stop reason: HOSPADM

## 2020-08-08 RX ORDER — DEXTROSE MONOHYDRATE 25 G/50ML
12.5 INJECTION, SOLUTION INTRAVENOUS PRN
Status: DISCONTINUED | OUTPATIENT
Start: 2020-08-08 | End: 2020-08-11 | Stop reason: HOSPADM

## 2020-08-08 RX ORDER — CHOLECALCIFEROL (VITAMIN D3) 125 MCG
5 CAPSULE ORAL NIGHTLY PRN
Status: DISCONTINUED | OUTPATIENT
Start: 2020-08-08 | End: 2020-08-11 | Stop reason: HOSPADM

## 2020-08-08 RX ORDER — DEXTROSE MONOHYDRATE 50 MG/ML
100 INJECTION, SOLUTION INTRAVENOUS PRN
Status: DISCONTINUED | OUTPATIENT
Start: 2020-08-08 | End: 2020-08-11 | Stop reason: HOSPADM

## 2020-08-08 RX ORDER — NICOTINE POLACRILEX 4 MG
15 LOZENGE BUCCAL PRN
Status: DISCONTINUED | OUTPATIENT
Start: 2020-08-08 | End: 2020-08-11 | Stop reason: HOSPADM

## 2020-08-08 RX ADMIN — HEPARIN SODIUM 5000 UNITS: 5000 INJECTION INTRAVENOUS; SUBCUTANEOUS at 00:31

## 2020-08-08 RX ADMIN — GABAPENTIN 300 MG: 300 CAPSULE ORAL at 09:52

## 2020-08-08 RX ADMIN — ATORVASTATIN CALCIUM 40 MG: 40 TABLET, FILM COATED ORAL at 09:52

## 2020-08-08 RX ADMIN — Medication 10 ML: at 21:19

## 2020-08-08 RX ADMIN — METHOCARBAMOL TABLETS 500 MG: 500 TABLET, COATED ORAL at 21:18

## 2020-08-08 RX ADMIN — HEPARIN SODIUM 5000 UNITS: 5000 INJECTION INTRAVENOUS; SUBCUTANEOUS at 16:09

## 2020-08-08 RX ADMIN — Medication 10 ML: at 09:52

## 2020-08-08 RX ADMIN — GABAPENTIN 300 MG: 300 CAPSULE ORAL at 00:46

## 2020-08-08 RX ADMIN — INSULIN GLARGINE 22 UNITS: 100 INJECTION, SOLUTION SUBCUTANEOUS at 00:31

## 2020-08-08 RX ADMIN — INSULIN LISPRO 6 UNITS: 100 INJECTION, SOLUTION INTRAVENOUS; SUBCUTANEOUS at 09:52

## 2020-08-08 RX ADMIN — INSULIN LISPRO 2 UNITS: 100 INJECTION, SOLUTION INTRAVENOUS; SUBCUTANEOUS at 21:19

## 2020-08-08 RX ADMIN — MONTELUKAST SODIUM 10 MG: 10 TABLET, FILM COATED ORAL at 00:30

## 2020-08-08 RX ADMIN — TIMOLOL MALEATE 1 DROP: 2.5 SOLUTION/ DROPS OPHTHALMIC at 12:04

## 2020-08-08 RX ADMIN — METHOCARBAMOL TABLETS 500 MG: 500 TABLET, COATED ORAL at 09:52

## 2020-08-08 RX ADMIN — HEPARIN SODIUM 5000 UNITS: 5000 INJECTION INTRAVENOUS; SUBCUTANEOUS at 22:53

## 2020-08-08 RX ADMIN — INSULIN GLARGINE 22 UNITS: 100 INJECTION, SOLUTION SUBCUTANEOUS at 09:52

## 2020-08-08 RX ADMIN — SERTRALINE HYDROCHLORIDE 200 MG: 50 TABLET ORAL at 21:18

## 2020-08-08 RX ADMIN — Medication 5 MG: at 22:41

## 2020-08-08 RX ADMIN — ONDANSETRON 4 MG: 2 INJECTION INTRAMUSCULAR; INTRAVENOUS at 06:20

## 2020-08-08 RX ADMIN — INSULIN LISPRO 3 UNITS: 100 INJECTION, SOLUTION INTRAVENOUS; SUBCUTANEOUS at 01:00

## 2020-08-08 RX ADMIN — GABAPENTIN 300 MG: 300 CAPSULE ORAL at 16:09

## 2020-08-08 RX ADMIN — INSULIN LISPRO 12 UNITS: 100 INJECTION, SOLUTION INTRAVENOUS; SUBCUTANEOUS at 12:04

## 2020-08-08 RX ADMIN — TIMOLOL MALEATE 1 DROP: 2.5 SOLUTION/ DROPS OPHTHALMIC at 00:30

## 2020-08-08 RX ADMIN — HYDROMORPHONE HYDROCHLORIDE 0.5 MG: 1 INJECTION, SOLUTION INTRAMUSCULAR; INTRAVENOUS; SUBCUTANEOUS at 22:40

## 2020-08-08 RX ADMIN — GABAPENTIN 300 MG: 300 CAPSULE ORAL at 21:18

## 2020-08-08 RX ADMIN — MICONAZOLE NITRATE: 20 POWDER TOPICAL at 21:18

## 2020-08-08 RX ADMIN — INSULIN LISPRO 4 UNITS: 100 INJECTION, SOLUTION INTRAVENOUS; SUBCUTANEOUS at 18:16

## 2020-08-08 RX ADMIN — SERTRALINE HYDROCHLORIDE 200 MG: 50 TABLET ORAL at 00:29

## 2020-08-08 RX ADMIN — MONTELUKAST SODIUM 10 MG: 10 TABLET, FILM COATED ORAL at 21:18

## 2020-08-08 RX ADMIN — Medication 10 ML: at 01:19

## 2020-08-08 RX ADMIN — METHOCARBAMOL TABLETS 500 MG: 500 TABLET, COATED ORAL at 00:30

## 2020-08-08 RX ADMIN — TIMOLOL MALEATE 1 DROP: 2.5 SOLUTION/ DROPS OPHTHALMIC at 21:18

## 2020-08-08 RX ADMIN — HEPARIN SODIUM 5000 UNITS: 5000 INJECTION INTRAVENOUS; SUBCUTANEOUS at 06:19

## 2020-08-08 RX ADMIN — INSULIN GLARGINE 22 UNITS: 100 INJECTION, SOLUTION SUBCUTANEOUS at 21:17

## 2020-08-08 RX ADMIN — HYDROMORPHONE HYDROCHLORIDE 1 MG: 1 INJECTION, SOLUTION INTRAMUSCULAR; INTRAVENOUS; SUBCUTANEOUS at 00:46

## 2020-08-08 RX ADMIN — MICONAZOLE NITRATE: 20 POWDER TOPICAL at 12:06

## 2020-08-08 ASSESSMENT — PAIN DESCRIPTION - FREQUENCY: FREQUENCY: CONTINUOUS

## 2020-08-08 ASSESSMENT — PAIN SCALES - GENERAL
PAINLEVEL_OUTOF10: 9
PAINLEVEL_OUTOF10: 8
PAINLEVEL_OUTOF10: 9
PAINLEVEL_OUTOF10: 10
PAINLEVEL_OUTOF10: 10

## 2020-08-08 ASSESSMENT — PAIN DESCRIPTION - ONSET: ONSET: ON-GOING

## 2020-08-08 ASSESSMENT — PAIN DESCRIPTION - ORIENTATION
ORIENTATION: LEFT

## 2020-08-08 ASSESSMENT — PAIN DESCRIPTION - LOCATION
LOCATION: ANKLE

## 2020-08-08 ASSESSMENT — PAIN DESCRIPTION - PAIN TYPE
TYPE: ACUTE PAIN

## 2020-08-08 ASSESSMENT — PAIN DESCRIPTION - DESCRIPTORS: DESCRIPTORS: CONSTANT

## 2020-08-08 ASSESSMENT — PAIN DESCRIPTION - PROGRESSION
CLINICAL_PROGRESSION: GRADUALLY WORSENING
CLINICAL_PROGRESSION: GRADUALLY WORSENING

## 2020-08-08 NOTE — PROGRESS NOTES
4 Eyes Skin Assessment     The patient is being assess for   Admission    I agree that 2 RN's have performed a thorough Head to Toe Skin Assessment on the patient. ALL assessment sites listed below have been assessed. Areas assessed by both nurses:   [x]   Head, Face, and Ears   [x]   Shoulders, Back, and Chest, Abdomen  [x]   Arms, Elbows, and Hands   [x]   Coccyx, Sacrum, and Ischium  [x]   Legs, Feet, and Heels        Scattered bruises and abrasions, larger abrasion on LLE, bruise on back, large bruise on back of left thigh, redness in skinfold/stomach, redness under right breast, redness/rash/moisture in groin area, scars on bilateral knees due to pervious surgeries. **SHARE this note so that the co-signing nurse is able to place an eSignature**    Co-signer eSignature: Electronically signed by Luc Sanchez RN on 8/8/20 at 4:54 AM EDT    Does the Patient have Skin Breakdown?   No          Eric Prevention initiated:  Yes   Wound Care Orders initiated:  No      [facility] nurse consulted for Pressure Injury (Stage 3,4, Unstageable, DTI, NWPT, Complex wounds)and New or Established Ostomies:  No      Primary Nurse eSignature: Electronically signed by Gopi Hood RN on 8/8/20 at 2:07 AM EDT

## 2020-08-08 NOTE — ED TRIAGE NOTES
Patient reports that ONLY dilaudid and zofran will help her pain. Patient reports that needs dilaudid to help with her 10/10 pain to left ankle. Patient is calm and cooperative in stretcher.

## 2020-08-08 NOTE — PROGRESS NOTES
Physical Therapy    Facility/Department: [facility] C5 - MED SURG/ORTHO  Initial Assessment    NAME: Dallas Peguero  : 1960  MRN: [id number]    Date of Service: 2020    Discharge Recommendations:  Subacute/Skilled Nursing Facility, 3-5 sessions per week   PT Equipment Recommendations  Equipment Needed: (defer to facility)    Assessment   Body structures, Functions, Activity limitations: Decreased functional mobility ; Decreased endurance;Decreased ROM; Decreased strength;Decreased safe awareness; Increased pain  Assessment: Patient is a 61 yo femaile admitted to ED  withleft hip pain, headache, neck pain, left shoulder pain and left ankle pain after falling. Had XR, CT which were neg for acute injury. Was recently admitted  as well for a fall. Pt lives along and reports prior to the past 2 weeks sustaining 4 falls, she was I with ambulation short household distances with a walker. For past 2 weeks pt reports receiving assist from [facility] 3-4x/wk for ADL's including dressing and bathing and very limited in ambulation, stated performing stand pivot transfers with walker and using w/c for longer distances. Pt reports had been caring for her father who was recently placed at [facility]. Pt currently functioning below baseline and not safe to return home alone. Rec pt dc to SNF prior to returning home to improve strength, endurance and independence with functional mobility. Treatment Diagnosis: Impaired functional mobility  Prognosis: Fair  Decision Making: Medium Complexity  PT Education: Goals;PT Role;General Safety;Plan of Care;Gait Training;Home Exercise Program;Equipment; Functional Mobility Training;Transfer Training  Barriers to Learning: none  REQUIRES PT FOLLOW UP: Yes  Activity Tolerance  Activity Tolerance: Patient limited by pain; Patient limited by fatigue       Patient Diagnosis(es): The primary encounter diagnosis was Fall in home, initial encounter.  Diagnoses of Closed head injury, falls  Follows Commands: Within Functional Limits  Subjective  Subjective: Pt found supine in bed. Reports in too much pain to move. Pt bed occasionally \"jerking\", asked for new bed. Agreeable to OOB transfer into chair.   Pain Screening  Patient Currently in Pain: Yes(RN aware)  Vital Signs  Patient Currently in Pain: Yes(RN aware)       Orientation  Orientation  Overall Orientation Status: Within Functional Limits  Social/Functional History  Social/Functional History  Lives With: Alone  Type of Home: House  Home Layout: Multi-level(on bottom level)  Home Access: Stairs to enter with rails  Entrance Stairs - Number of Steps: 6 JORJE  Entrance Stairs - Rails: Left  Bathroom Shower/Tub: Walk-in shower, Shower chair without back(handrails in shower)  Bathroom Toilet: Handicap height  Home Equipment: Rolling walker, Cane, [employer]  ADL Assistance: Needs assistance(has aide that comes a couple days/wk with showering, dressing)  Homemaking Assistance: Needs assistance  Ambulation Assistance: Independent(very limited distances with walker)  Transfer Assistance: Independent(starting last 2 weeks has had 4 falls and hasn't been able to sleep in bed since)  Active : No  Patient's  Info: pt's aide drives  Leisure & Hobbies: 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  Cognition        Objective          AROM RLE (degrees)  RLE AROM: WFL  AROM LLE (degrees)  LLE General AROM: pt ref AROM/PROM 2/2 pain  Strength RLE  Comment: gross strength at least 3/5  Strength LLE  Comment: unable to assess 2/2 pt report of increased pain with attempt to move        Bed mobility  Supine to Sit: Dependent/Total(max A x2)  Scooting: Dependent/Total(with juan [place])  Transfers  Sit to Stand: Dependent/Total;2 Person Assistance(max A x2 from EOB x2)  Stand to sit: Dependent/Total;2 Person Assistance(mod A x2 to bed and to chair)  Bed to Chair: 2 Person Assistance;Dependent/Total(mod A x2 with SW)  Stand Pivot Transfers: 2 Person Assistance;Dependent/Total(mod A x2 with walker, pt ref WB on LLE 2/2 pain, pivot on RLE)  Ambulation  Ambulation?: No(pt declined ambulation, reports significant pain LLE with stand pivot transfer to chair)     Balance  Sitting - Static: Fair;+  Sitting - Dynamic: Fair  Standing - Static: Fair;-  Standing - Dynamic: Poor  Comments: Pt sat EOB 5 mins with SBA, no LOB, some dynamic RLE movement and used UE support on EOB  Exercises  Hip Flexion: RLE, limited ROM  Knee Long Arc Quad: RLE, limited ROM  Ankle Pumps: RLE, limited ROM     Plan   Plan  Times per week: 3-5  Times per day: Daily  Current Treatment Recommendations: Strengthening, Gait Training, ROM, Stair training, Balance Training, Neuromuscular Re-education, Functional Mobility Training, Endurance Training, Home Exercise Program, Transfer Training, Wheelchair Mobility Training, Safety Education & Training  Safety Devices  Type of devices: All fall risk precautions in place, Call light within reach, Left in chair, Chair alarm in place, Nurse notified, Gait belt             AM-PAC Score     AM-PAC Inpatient Mobility without Stair Climbing Raw Score : 10 (08/08/20 0922)  AM-PAC Inpatient without Stair Climbing T-Scale Score : 34.07 (08/08/20 0922)  Mobility Inpatient CMS 0-100% Score: 71.66 (08/08/20 3140)  Mobility Inpatient without Stair CMS G-Code Modifier : CL (08/08/20 9958)       Goals  Short term goals  Time Frame for Short term goals: 4 days  Short term goal 1: Patient will perform supine <> sit transfers with min A x2. Short term goal 2: Patient will perform sit<>stand transfers with min A x2 and SW. Short term goal 3: Patient will perform stand pivot transfers with min A x2 and SW. Short term goal 4: Patient will walk 10 feet with LRAD and min A x2.   Patient Goals   Patient goals : Return home       Therapy Time   Individual Concurrent Group Co-treatment   Time In 9239         Time Out 7505         Minutes 25 Yuliana Peace, PT

## 2020-08-08 NOTE — H&P
Hospital Medicine History & Physical      PCP: Tesfaye Martin MD    Date of Admission: 8/7/2020    Date of Service: Pt seen/examined on 08/07/2020 and Admitted to observation with expected LOS less than two midnights due to medical therapy. Chief Complaint:    Chief Complaint   Patient presents with    Ankle Pain     patient sitting on walker and it collapsed. Patient reports that left ankle was folded under her walker. Patient has swelling to left ankle. History Of Present Illness:      60 y.o. female, with PMH of HLD, DM2, frequent falls, who presented to [facility] with mechanical fall. History was obtained from the patient and review of the EMR. The patient states that she typically uses her rolling walker to get around by sitting on it and rolling. While using it this evening, her legs gave out on her and she landed on her left side, rolling her left ankle in the process. She also complains of pain in her left hip, neck, and her left shoulder. She states that she was charging her med-alert necklace when she fell and waited about an hour on the floor until someone came to get her. She currently lives alone as she just moved her parents into nursing homes recently. The patient states she is unable to put any pressure on the left ankle due to severe pain. States that ONLY dilaudid can help her pain and that she does not want to try any oral medications. She also refuses to ice the ankle as she is too cold. I did discuss with her that we need to trial oral medications to see what helps to control her pain as she will not be able to get IV dilaudid at home. The patient states she has no interest in SNF or ECF placement at all. She currently works with [employer] [facility] therapy and has nursing come out a few times a week. However, she only has a few sessions left with them and would like for this to be worked up again due to her new pains.     Past Medical History:          Diagnosis Date  Acid reflux     Acute renal failure ([facility] [address] 2011    resolved    Anemia     Arthritis     causing severe lower back pains    Asthma     Bronchitis     Bursitis     all joints    C. difficile colitis     Diabetes mellitus ([facility] [address]     age 4   Ramirez Herpes     5    History of blood transfusion     Hypercholesteremia     Hypertension     Migraine     Neuropathy     hips to legs    Obesity     Psychiatric problem     anxiety and depression    Recurrent sinus infections     Renal failure     in past with 8 dialysis treatments    Retinopathy of both eyes     Sleep apnea     can't use a cpap    Ulcer, stomach peptic     x 3    Unspecified cerebral artery occlusion with cerebral infarction     \"silent stroke\"    Unspecified diseases of blood and blood-forming organs     bled for 1 year straight at age 4       Past Surgical History:          Procedure Laterality Date    ABDOMEN SURGERY      ABDOMINAL EXPLORATION SURGERY  13    EXPLORATORY LAPAROTOMY LYSIS OF ADHESIONS    APPENDECTOMY      CARPAL TUNNEL RELEASE      both hands    CATARACT REMOVAL WITH IMPLANT  11    right    CATARACT REMOVAL WITH IMPLANT  2011    left eye     SECTION      COLONOSCOPY      DILATATION, ESOPHAGUS      ENDOSCOPY, COLON, DIAGNOSTIC      EYE SURGERY      laser, vitrectomy ou, cataract ou    FRACTURE SURGERY      HYSTERECTOMY      partial    JOINT REPLACEMENT      both knees    OTHER SURGICAL HISTORY  8/15/11    Left Shoulder Decompression    SHOULDER SURGERY      right     SUBTOTAL COLECTOMY  2000    TONSILLECTOMY      TUNNELED VENOUS PORT PLACEMENT  11    TUNNELED VENOUS PORT PLACEMENT  3/1/13    Power Tressa       Medications Prior to Admission:      Prior to Admission medications    Medication Sig Start Date End Date Taking?  Authorizing Provider   insulin glargine (BASAGLAR KWIKPEN) 100 UNIT/ML injection pen Inject into the skin nightly    Historical Provider, MD   Insulin Glargine (BASAGLAR KWIKPEN SC) Inject 22 Units into the skin 2 times daily Before breakfast and dinner    Historical Provider, MD   methocarbamol (ROBAXIN) 500 MG tablet TAKE ONE TABLET BY MOUTH TWICE A DAY 8/12/19   Vane Castaneda MD   diazepam (VALIUM) 10 MG tablet take 1 tablet by oral route  every bedtime    Historical Provider, MD   atorvastatin (LIPITOR) 40 MG tablet Take 40 mg by mouth    Historical Provider, MD   Misc. Devices Maria De Jesus Chadd) MISC 1 each by Does not apply route daily 10/9/17   JAKE Jimenez   insulin aspart (NOVOLOG FLEXPEN) 100 UNIT/ML injection pen Continue with your current aspart insulin regimen (fairly complex aspart system which is a combination of SSI, carb counting, scheduled post-meal insulin, and adjustments based on her level of nausea at the time - about 40u/day). Patient taking differently: Continue with your current aspart insulin regimen (fairly complex aspart system which is a combination of SSI, carb counting, scheduled post-meal insulin, and adjustments based on her level of nausea at the time - about 40u/day). 1 unit per 8 carbs and 1 unit for every 12 points but pt is unsure what her \"high\" number is. 7/17/17   Jose Manuel Miller MD   timolol (BETIMOL) 0.25 % ophthalmic solution 1-2 drops 2 times daily    Historical Provider, MD   Cholecalciferol (VITAMIN D3) 2000 UNITS CAPS Take  by mouth daily. Historical Provider, MD   Cyanocobalamin (VITAMIN B 12 PO) Take 1 tablet by mouth daily. Historical Provider, MD   L-Lysine 500 MG CAPS Take  by mouth 4 times daily as needed. Historical Provider, MD   sertraline (ZOLOFT) 100 MG tablet Take 200 mg by mouth nightly. Historical Provider, MD   lactulose (CHRONULAC) 10 GM/15ML solution Take 20 g by mouth daily. Historical Provider, MD   ketorolac (ACULAR LS) 0.4 % SOLN ophthalmic solution 1 drop daily. 2/27/11   Historical Provider, MD   olopatadine (PATANOL) 0.1 % ophthalmic solution 1 drop 2 times daily. jugular venous distention. Trachea midline. Respiratory:  Normal respiratory effort. Clear to auscultation, bilaterally without Rales/Wheezes/Rhonchi. Cardiovascular:  Regular rate and rhythm with normal S1/S2 without murmurs, rubs or gallops. Abdomen: Soft, non-tender, non-distended with normal bowel sounds. Musculoskeletal:  No clubbing, cyanosis or bilaterally. Limited ROM to LLE due to intense pain in left ankle. Also with tenderness to L shoulder, left hip. Slight edema noted to L ankle, ace wrapped. Skin: Skin color, texture, turgor normal.  No rashes or lesions. Neurologic:  Neurovascularly intact without any focal sensory/motor deficits. Cranial nerves: II-XII intact, grossly non-focal.  Psychiatric:  Alert and oriented, thought content appropriate, normal insight  Capillary Refill: Brisk,< 3 seconds   Peripheral Pulses: +2 palpable, equal bilaterally       Labs:     Recent Labs     08/07/20 2136   WBC 8.6   HGB 10.8*   HCT 32.9*        Recent Labs     08/07/20 2136      K 4.9      CO2 24   BUN 43*   CREATININE 1.7*   CALCIUM 9.5     Recent Labs     08/07/20 2136   AST 10*   ALT 9*   BILITOT <0.2   ALKPHOS 76     No results for input(s): INR in the last 72 hours. No results for input(s): Laverda Dancer in the last 72 hours. Urinalysis:      Lab Results   Component Value Date    NITRU Negative 06/26/2020    WBCUA 10-20 06/26/2020    BACTERIA 3+ 06/26/2020    RBCUA None seen 06/26/2020    BLOODU Negative 06/26/2020    SPECGRAV 1.015 06/26/2020    GLUCOSEU 250 06/26/2020    GLUCOSEU NEGATIVE 05/03/2012       Radiology:     CXR: I have reviewed the CXR with the following interpretation: n/a  EKG:  I have reviewed the EKG with the following interpretation: n/a    XR SHOULDER LEFT (MIN 2 VIEWS)   Final Result   No acute findings         CT HEAD WO CONTRAST   Final Result   No acute intracranial abnormality.          CT CERVICAL SPINE WO CONTRAST   Final Result   No acute abnormality of the cervical spine. Mild degenerative and other findings, as above. CT PELVIS WO CONTRAST Additional Contrast? None   Final Result   No acute abnormality of the left hip. XR ANKLE LEFT (MIN 3 VIEWS)   Final Result   No acute findings involving the left hip were ankle. XR HIP 2-3 VW W PELVIS LEFT   Final Result   No acute findings involving the left hip were ankle. ASSESSMENT:    Active Hospital Problems    Diagnosis Date Noted    Morbid obesity due to excess calories ([facility] [address] [E66.01]      Priority: Medium     Class: Present on Admission    Diabetes mellitus ([facility] [address] [E11.9]      Priority: Medium     Class: Present on Admission    Essential hypertension [I10]      Priority: Medium     Class: Present on Admission    Frequent falls [R29.6] 08/07/2020    HLD (hyperlipidemia) [E78.5] 01/22/2013    Chronic kidney disease, stage 3, mod decreased GFR (HCC) [N18.3] 01/22/2013     PLAN:    Acute left ankle, left shoulder, and left hip pain 2/2 mechanical fall.  - XR left shoulder, left hip, and left ankle: No acute findings  - Ace wrap to left ankle, apply ice as tolerated (pt refused due to being cold)  - Analgesia as needed , continued home robaxin  - PT/OT consult  - Case management consult - pt is refusing SNF/ECF placement but would like to continue her home healthcare    CKD, stage 3. Baseline Cr ~1.6-1.8.  - Stable Cr at 1.7  - Avoid nephrotoxins  - Monitor with BMP    DM2, uncontrolled. -  on admission  - Hemoglobin a1c pending  - MDSSI + lantus  - POCT ac/hs  - Hypoglycemia protocol  - Carb control diet    Morbid Obesity -  With Body mass index is 58.36 kg/m². Complicating assessment and treatment. Placing patient at risk for multiple co-morbidities as well as early death and contributing to the patient's presentation. Counseled on weight loss    Hx of HLD, well controlled with statin.   - Continue home Lipitor  - Follow-up with PCP for med adjustments    Anxiety/depression, mood is stable. Continue home zoloft. Hx of asthma, does not appear to be in acute exacerbation. Continue home singulair. Did not note any nebs/inhalers on home med list.    DVT Prophylaxis: heparin subcu  Diet: DIET RENAL;  Code Status: Limited    PT/OT Eval Status: Ordered and pending    Dispo - Pending clinical improvement, home tomorrow? Shalini Zavala Daily - NP    Thank you Kylah Zarco MD for the opportunity to be involved in this patient's care.  If you have any questions or concerns please feel free to contact me at [phone].  -------------------------Anticipated Dr. Radha hooper-------------------

## 2020-08-08 NOTE — PROGRESS NOTES
Hospitalist Progress Note      PCP: Omer Serrano MD    Date of Admission: 8/7/2020    Chief Complaint: ankle pain. Falls, cannot walk. Hospital Course: \"60 y.o. female, with PMH of HLD, DM2, frequent falls, who presented to [facility] with mechanical fall. The patient states that she typically uses her rolling walker to get around by sitting on it and rolling. While using it this evening, her legs gave out on her and she landed on her left side, rolling her left ankle in the process. She also complains of pain in her left hip, neck, and her left shoulder. She states that she was charging her med-alert necklace when she fell and waited about an hour on the floor until someone came to get her. She currently lives alone as she just moved her parents into nursing homes recently. The patient states she is unable to put any pressure on the left ankle due to severe pain. The patient states she has no interest in SNF or ECF placement at all. She currently works with [employer] physical therapy and has nursing come out a few times a week. \"       Subjective:  She cannot walk. She knows she will continue to fall, and that this can potentially have deadly consequences. She still wants to go home. She won't last 24 hours at home. CM consulted.          Medications:  Reviewed    Infusion Medications    dextrose       Scheduled Medications    insulin lispro  0-12 Units Subcutaneous TID WC    insulin lispro  0-6 Units Subcutaneous Nightly    gabapentin  300 mg Oral TID    miconazole   Topical BID    ondansetron        atorvastatin  40 mg Oral Daily    insulin glargine  22 Units Subcutaneous BID    methocarbamol  500 mg Oral BID    montelukast  10 mg Oral Nightly    sertraline  200 mg Oral Nightly    timolol  1 drop Both Eyes BID    sodium chloride flush  10 mL Intravenous 2 times per day    heparin (porcine)  5,000 Units Subcutaneous 3 times per day     PRN Meds: glucose, dextrose, glucagon (rDNA), dextrose, sodium chloride flush, acetaminophen **OR** acetaminophen, magnesium hydroxide, promethazine **OR** ondansetron, oxyCODONE-acetaminophen    No intake or output data in the 24 hours ending 08/08/20 0942    Physical Exam Performed:    BP (!) 169/97   Pulse 91   Temp 97.8 °F (36.6 °C) (Oral)   Resp 18   Ht 5' 4\" (1.626 m)   Wt (!) 372 lb 2.2 oz (168.8 kg)   SpO2 100%   BMI 63.88 kg/m²     General appearance: No apparent distress, appears stated age and cooperative. HEENT: Pupils equal, round, and reactive to light. Conjunctivae/corneas clear. Neck: Supple, with full range of motion. No jugular venous distention. Trachea midline. Respiratory:  Normal respiratory effort. Clear to auscultation, bilaterally without Rales/Wheezes/Rhonchi. Cardiovascular: Regular rate and rhythm with normal S1/S2 without murmurs, rubs or gallops. Abdomen: Soft, non-tender, non-distended with normal bowel sounds. Musculoskeletal: No clubbing, cyanosis or edema bilaterally. Full range of motion without deformity. Skin: Skin color, texture, turgor normal.  No rashes or lesions. Neurologic:  Neurovascularly intact without any focal sensory/motor deficits. Cranial nerves: II-XII intact, grossly non-focal.  Psychiatric: Alert and oriented, thought content appropriate, normal insight  Capillary Refill: Brisk,< 3 seconds   Peripheral Pulses: +2 palpable, equal bilaterally       Labs:   Recent Labs     08/07/20 2136 08/08/20  0610   WBC 8.6 8.2   HGB 10.8* 11.2*   HCT 32.9* 33.7*    260     Recent Labs     08/07/20 2136 08/08/20  0610    141   K 4.9 5.1    104   CO2 24 26   BUN 43* 41*   CREATININE 1.7* 1.6*   CALCIUM 9.5 9.2     Recent Labs     08/07/20 2136   AST 10*   ALT 9*   BILITOT <0.2   ALKPHOS 76     No results for input(s): INR in the last 72 hours. No results for input(s): Tiney Crandall in the last 72 hours.     Urinalysis:      Lab Results   Component Value Date    NITRU Negative 06/26/2020    WBCUA 10-20 06/26/2020    BACTERIA 3+ 06/26/2020    RBCUA None seen 06/26/2020    BLOODU Negative 06/26/2020    SPECGRAV 1.015 06/26/2020    GLUCOSEU 250 06/26/2020    GLUCOSEU NEGATIVE 05/03/2012       Radiology:  XR SHOULDER LEFT (MIN 2 VIEWS)   Final Result   No acute findings         CT HEAD WO CONTRAST   Final Result   No acute intracranial abnormality. CT CERVICAL SPINE WO CONTRAST   Final Result   No acute abnormality of the cervical spine. Mild degenerative and other findings, as above. CT PELVIS WO CONTRAST Additional Contrast? None   Final Result   No acute abnormality of the left hip. XR ANKLE LEFT (MIN 3 VIEWS)   Final Result   No acute findings involving the left hip were ankle. XR HIP 2-3 VW W PELVIS LEFT   Final Result   No acute findings involving the left hip were ankle. Assessment/Plan:    Active Hospital Problems    Diagnosis    Morbid obesity due to excess calories ([facility] [address] [E66.01]     Priority: Medium     Class: Present on Admission    Diabetes mellitus ([facility] [address] [E11.9]     Priority: Medium     Class: Present on Admission    Essential hypertension [I10]     Priority: Medium     Class: Present on Admission    Frequent falls [R29.6]    HLD (hyperlipidemia) [E78.5]    Chronic kidney disease, stage 3, mod decreased GFR (HCC) [N18.3]       \"60 y.o. female, with PMH of HLD, DM2, frequent falls, who presented to [address] [place] [address] with mechanical fall. The patient states that she typically uses her rolling walker to get around by sitting on it and rolling. While using it this evening, her legs gave out on her and she landed on her left side, rolling her left ankle in the process. She also complains of pain in her left hip, neck, and her left shoulder. She states that she was charging her med-alert necklace when she fell and waited about an hour on the floor until someone came to get her.  She currently lives alone as she just moved her

## 2020-08-08 NOTE — PROGRESS NOTES
Acute renal failure ([facility] [address] Anemia, Arthritis, Asthma, Bronchitis, Bursitis, C. difficile colitis, Diabetes mellitus ([facility] [address] Herpes, History of blood transfusion, Hypercholesteremia, Hypertension, Migraine, Neuropathy, Obesity, Psychiatric problem, Recurrent sinus infections, Renal failure, Retinopathy of both eyes, Sleep apnea, Ulcer, stomach peptic, Unspecified cerebral artery occlusion with cerebral infarction, and Unspecified diseases of blood and blood-forming organs. has a past surgical history that includes Hysterectomy; Appendectomy; Carpal tunnel release;  section; subtotal colectomy (2000); Cataract removal with implant (11); Cataract removal with implant (2011); Tunneled venous port placement (11); other surgical history (8/15/11); eye surgery; shoulder surgery; Tonsillectomy; Colonoscopy; Abdominal exploration surgery (13); joint replacement; Tunneled venous port placement (3/1/13); Abdomen surgery; Endoscopy, colon, diagnostic; Dilatation, esophagus; and fracture surgery. Restrictions  Position Activity Restriction  Other position/activity restrictions: up with assist    Subjective   General  Chart Reviewed: Yes  Patient assessed for rehabilitation services?: Yes  Family / Caregiver Present: No  Referring Practitioner: CHIQUITA Brock NP  Diagnosis: Frequent falls, L ankle swelling; L ankle, shoulder, and hip - for acute fx. Subjective  Subjective: RN cleared pt for tx. Pt supine at session start. Patient Currently in Pain: Yes  Pain Assessment  Pain Assessment: 0-10  Pain Level: 10  Pain Type: Acute pain  Pain Location: Ankle  Pain Orientation: Left  Pt satisfied with rest, repositioning, warm blankets, food, and distraction after increased activity during session.     Social/Functional History  Social/Functional History  Lives With: Alone  Type of Home: House  Home Layout: Multi-level(on bottom level)  Home Access: Stairs to enter with rails  Entrance Stairs - Number of Steps: 6 JORJE  Entrance Stairs - Rails: Left  Bathroom Shower/Tub: Walk-in shower, Shower chair without back(handrails in shower)  Bathroom Toilet: Handicap height  Home Equipment: Rolling walker, Cane, [employer]  ADL Assistance: Needs assistance(has aide that comes a couple days/wk with showering, dressing)  Homemaking Assistance: Needs assistance  Ambulation Assistance: Independent(very limited distances with walker)  Transfer Assistance: Independent(starting last 2 weeks has had 4 falls and hasn't been able to sleep in bed since)  Active : No  Patient's  Info: pt's aide drives  Leisure & Hobbies: 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 Limited by pain, heavy reliance on SW. ADL  Feeding: Stand by assistance(to drink EOB)  Toileting: Dependent/Total(pure wick)  Additional Comments: Pt declining further ADL at this time. Tone RUE  RUE Tone: Normotonic  Tone LUE  LUE Tone: Normotonic  Coordination  Movements Are Fluid And Coordinated: Yes        Bed mobility  Supine to Sit: Dependent/Total(max A x2, HOB at 45 degrees, use of bed rail)  Scooting: Dependent/Total(max A x2 to EOB)  Transfers  Sit to stand: Dependent/Total(max A x2 on 2nd attempt from EOB with SW)  Stand to sit: Dependent/Total(min A x2)  Transfer Comments: VCs for safe use of SW and technique during stand to sit.      Cognition  Overall Cognitive Status: [facility] Exercise  Pt completed 1 set, 3-5 reps of B digit, elbow, and shoulder flex/ext, shoulder ab/dduction, and chest press within available range (limited by pain) while seated after education. Pt educated to complete at least 3x/day, 5-10 reps increasing repetitions as able. Written handout provided. Pt stated and demonstrated understanding. BUE strength and AROM are NOT WFL based on functional presentation. Elbows and distal AROM is WFL. Shoulders limited bilaterally to ~<60 degrees actively during flexion and abduction. Education: Role of OT, safe t/f training, safe use of DME, awareness of deficits, discharge planning, ADL as therapeutic exercise, importance of OOB, HEP       Plan   Plan  Times per week: 3-5    AM-PAC Score        AM-[facility] Inpatient Daily Activity Raw Score: 12 (08/08/20 1043)  AM-PAC Inpatient ADL T-Scale Score : 30.6 (08/08/20 1043)  ADL Inpatient CMS 0-100% Score: 66.57 (08/08/20 1043)  ADL Inpatient CMS G-Code Modifier : CL (08/08/20 1043)    Goals  Short term goals  Time Frame for Short term goals: 1 week (8/15/20)  Short term goal 1: BSC t/f with min A x2  Short term goal 2: Toileting with mod A and LE AE prn  Short term goal 3: Bed mobility with mod A x2  Short term goal 4: LB dressing with mod A x2 by 8/13/20  Patient Goals   Patient goals : \"Be able to take care of myself and be in less pain. \"       Therapy Time   Individual Concurrent Group Co-treatment   Time In 0805         Time Out 0842         Minutes 37         Timed Code Treatment Minutes: 32 Minutes       If patient is discharged prior to next treatment session, this note will serve as the discharge summary.   Suman Mason, OTR/L #[id number]

## 2020-08-08 NOTE — PLAN OF CARE
Problem: Falls - Risk of:  Goal: Will remain free from falls  Description: Will remain free from falls  Outcome: Ongoing  Goal: Absence of physical injury  Description: Absence of physical injury  Outcome: Ongoing     Problem: Skin Integrity:  Goal: Will show no infection signs and symptoms  Description: Will show no infection signs and symptoms  Outcome: Ongoing

## 2020-08-08 NOTE — CARE COORDINATION
CASE MANAGEMENT INITIAL ASSESSMENT      Reviewed chart and completed assessment via telephone with: patient   Explained Case Management role/services. yes    Primary contact information: Suzi Gonzalez [phone] brother    Admit date/status: obs  Diagnosis: frequent falls    Insurance: [address] required for SNF - Y     3 night stay required -  N    Living arrangements, Adls, care needs, prior to admission: lives alone in a multi level house. She resides on the lower level. Uses RW and WC. Has meals delivered every other Tuesday through her waiver program. Care for her father who just moved into [facility]. Transportation: will need squad    [address] at home: Walker_x_Cane_x_RTS__ BSC__Shower Chair__  02__ HHN__ CPAP__  BiPap__  Hospital Bed__ W/C_x__ Other__________    Services in the home and/or outpatient, prior to admission: has skilled care through [address]  care and an aide that comes 2 days a week to help with bathes and does some grocery shopping. She is not sure of the aides agency. PT/OT recs:  SNF    Hospital Exemption Notification (HEN): needed for snf    Barriers to discharge: does not want to go to snf but not walking and is 2 person assist    Plan/comments: patient plan to return to home with the help she already has. patient states \" my aide is going to stay with me tonight when I get home\". CM talked to patient about the recommendations of rehab so she could get stronger for a safe return to home. Patient states \" I have to get home to take care of paper work for my dad moving into [facility]. My mother is at [address] and it's a hell hole so I am working on getting her to [facility] so they can be together\". CM expressed concern that patient would not be able to remain at home if she is not able to care for herself right now. Patient said she still feels she can go home and care for herself and finish what is needed for her parents.  patient states she will need a ride home to get down the 6 steps into her house. CM can arrange transport at discharge.      ECOC on chart for MD signature yes

## 2020-08-08 NOTE — FLOWSHEET NOTE
08/08/20 9487   Encounter Summary   Services provided to: Patient   Referral/Consult From: Patient   Support System Family members   Continue Visiting   (8-8, brice, support)   Complexity of Encounter Low   Length of Encounter 15 minutes   Spiritual/Quaker   Type Spiritual support   Assessment Calm; Approachable   Intervention Active listening;Explored feelings, thoughts, concerns;Nurtured hope   Outcome Engaged in conversation;Receptive   Patient sharing her stress of placing her father in a nursing home. She states the rosary is her life line. Provided rosadilshad. She is caring lots of stress related to changes in her family. She hopes to get her mother (in a facility with Alzheimers) into the facility where her father is. PRN follow-up.

## 2020-08-08 NOTE — ED PROVIDER NOTES
[place] [facility] - [facility]  EMERGENCY DEPARTMENT ENCOUNTER        Pt Name: Sandy Tucker  MRN: [id number]  [place] 1960  Date of evaluation: 8/7/2020  Provider: CHIQUITA Girard - CNP  PCP: Orlando Lew MD     I have seen and evaluated this patient with my supervising physician No att. providers found. CHIEF COMPLAINT       Chief Complaint   Patient presents with    Ankle Pain     patient sitting on walker and it collapsed. Patient reports that left ankle was folded under her walker. Patient has swelling to left ankle. HISTORY OF PRESENT ILLNESS   (Location, Timing/Onset, Context/Setting, Quality, Duration, Modifying Factors, Severity, Associated Signs and Symptoms)  Note limiting factors. Sandy Tucker is a 60 y.o. female who presents with left hip pain, headache, neck pain, left shoulder pain and left ankle pain after falling. States she was using her roller walker when she stood up to put some tea away in the fridge and her leg gave out, causing her to fall back and  Hit her head and land with her leg stuck underneath her. Denies any loc, nausea, vomiting, fevers, cough, chest pain, or abdominal pain. Rates pain a 10/10    Nursing Notes were all reviewed and agreed with or any disagreements were addressed in the HPI. REVIEW OF SYSTEMS    (2-9 systems for level 4, 10 or more for level 5)     Review of Systems   Constitutional: Negative. Respiratory: Negative for cough and shortness of breath. Cardiovascular: Negative for chest pain. Gastrointestinal: Negative for abdominal distention, abdominal pain, diarrhea, nausea and vomiting. Genitourinary: Negative. Musculoskeletal: Positive for neck pain. Negative for back pain. Left hip pain, left ankle pain, left shoulder pain   Skin: Negative. Allergic/Immunologic: Negative. Neurological: Positive for headaches. Negative for dizziness, seizures, syncope, weakness, light-headedness and numbness. Hematological: Negative. Psychiatric/Behavioral: Negative. Positives and Pertinent negatives as per HPI. Except as noted above in the ROS, all other systems were reviewed and negative.        PAST MEDICAL HISTORY     Past Medical History:   Diagnosis Date    Acid reflux     Acute renal failure ([address] 2011    resolved    Anemia     Arthritis     causing severe lower back pains    Asthma     Bronchitis     Bursitis     all joints    C. difficile colitis     Diabetes mellitus ([facility] [address]     age 4   [facility] Herpes     5    History of blood transfusion     Hypercholesteremia     Hypertension     Migraine     Neuropathy     hips to legs    Obesity     Psychiatric problem     anxiety and depression    Recurrent sinus infections     Renal failure     in past with 8 dialysis treatments    Retinopathy of both eyes     Sleep apnea     can't use a cpap    Ulcer, stomach peptic     x 3    Unspecified cerebral artery occlusion with cerebral infarction     \"silent stroke\"    Unspecified diseases of blood and blood-forming organs     bled for 1 year straight at age 4         SURGICAL HISTORY     Past Surgical History:   Procedure Laterality Date    ABDOMEN SURGERY      ABDOMINAL EXPLORATION SURGERY  13    EXPLORATORY LAPAROTOMY LYSIS OF ADHESIONS    APPENDECTOMY      CARPAL TUNNEL RELEASE      both hands    CATARACT REMOVAL WITH IMPLANT  11    right    CATARACT REMOVAL WITH IMPLANT  2011    left eye     SECTION      COLONOSCOPY      DILATATION, ESOPHAGUS      ENDOSCOPY, COLON, DIAGNOSTIC      EYE SURGERY      laser, vitrectomy ou, cataract ou    FRACTURE SURGERY      HYSTERECTOMY      partial    JOINT REPLACEMENT      both knees    OTHER SURGICAL HISTORY  8/15/11    Left Shoulder Decompression    SHOULDER SURGERY      right     SUBTOTAL COLECTOMY  2000    TONSILLECTOMY      TUNNELED VENOUS PORT PLACEMENT  11    TUNNELED VENOUS PORT PLACEMENT  3/1/13 Devonte Bustillos         [address]       Discharge Medication List as of 8/11/2020  1:57 PM      CONTINUE these medications which have NOT CHANGED    Details   !! insulin glargine (BASAGLAR KWIKPEN) 100 UNIT/ML injection pen Inject into the skin nightlyHistorical Med      methocarbamol (ROBAXIN) 500 MG tablet TAKE ONE TABLET BY MOUTH TWICE A DAY, Disp-60 tablet, R-0Normal      diazepam (VALIUM) 10 MG tablet take 1 tablet by oral route  every bedtimeHistorical Med      atorvastatin (LIPITOR) 40 MG tablet Take 40 mg by mouthHistorical Med      Misc. Devices Hussein Crowe) MISC DAILY Starting Mon 10/9/2017, Disp-1 each, R-0, Print      insulin aspart (NOVOLOG FLEXPEN) 100 UNIT/ML injection pen Continue with your current aspart insulin regimen (fairly complex aspart system which is a combination of SSI, carb counting, scheduled post-meal insulin, and adjustments based on her level of nausea at the time - about 40u/day). , Disp-5 Pen, R-3NO PRIN T      timolol (BETIMOL) 0.25 % ophthalmic solution 1-2 drops 2 times daily      Cholecalciferol (VITAMIN D3) 2000 UNITS CAPS Take  by mouth daily. Cyanocobalamin (VITAMIN B 12 PO) Take 1 tablet by mouth daily. L-Lysine 500 MG CAPS Take  by mouth 4 times daily as needed. sertraline (ZOLOFT) 100 MG tablet Take 200 mg by mouth nightly. lactulose (CHRONULAC) 10 GM/15ML solution Take 20 g by mouth daily. ketorolac (ACULAR LS) 0.4 % SOLN ophthalmic solution 1 drop daily. olopatadine (PATANOL) 0.1 % ophthalmic solution 1 drop 2 times daily. medroxyPROGESTERone (PROVERA) 2.5 MG tablet Take 10 mg by mouth daily. montelukast (SINGULAIR) 10 MG tablet Take 10 mg by mouth nightly. !! - Potential duplicate medications found. Please discuss with provider. ALLERGIES     Adhesive tape; Ambien [zolpidem]; Codeine; Flurosyn [fluocinolone acetonide];  Morphine; and Tegaderm ag mesh [silver]    FAMILYHISTORY       Family History Problem Relation Age of Onset    Diabetes Father     Heart Disease Father         mi    High Blood Pressure Father     High Cholesterol Father     Diabetes Paternal Aunt     Heart Disease Paternal Aunt     High Blood Pressure Paternal Aunt     High Cholesterol Paternal Aunt     Cancer Paternal Aunt         ovarian    Mult Sclerosis Paternal Aunt     Dementia Mother     High Blood Pressure Mother     Heart Disease Maternal Grandmother     Heart Disease Paternal Grandmother     Diabetes Paternal Uncle     Heart Disease Paternal Uncle     High Blood Pressure Paternal Uncle     High Cholesterol Paternal Uncle     Cancer Paternal Uncle     Cancer Maternal Grandfather         lung          SOCIAL HISTORY       Social History     Tobacco Use    Smoking status: Never Smoker    Smokeless tobacco: Never Used   Substance Use Topics    Alcohol use: No    Drug use: No       SCREENINGS    [place] Coma Scale  Eye Opening: Spontaneous  Best Verbal Response: Oriented  Best Motor Response: Obeys commands  [place] Coma Scale Score: 15        PHYSICAL EXAM    (up to 7 for level 4, 8 or more for level 5)     ED Triage Vitals [08/07/20 2040]   BP Temp Temp Source Pulse Resp SpO2 Height Weight   (!) 158/72 98.6 °F (37 °C) Oral 88 16 100 % 5' 4\" (1.626 m) (!) 340 lb (154.2 kg)       Physical Exam  Constitutional:       General: She is in acute distress. Appearance: She is obese. HENT:      Head: Normocephalic and atraumatic. Nose: Nose normal.      Mouth/Throat:      Mouth: Mucous membranes are moist.      Pharynx: Oropharynx is clear. Eyes:      Pupils: Pupils are equal, round, and reactive to light. Neck:      Musculoskeletal: Muscular tenderness present. Cardiovascular:      Rate and Rhythm: Normal rate and regular rhythm. Pulses: Normal pulses. Heart sounds: Normal heart sounds. No murmur. No friction rub. No gallop.     Pulmonary:      Effort: Pulmonary effort is normal.      Breath TO MG FOR LOW K - Abnormal; Notable for the following components:    Glucose 254 (*)     BUN 41 (*)     CREATININE 1.6 (*)     GFR Non- 33 (*)     GFR  40 (*)     All other components within normal limits    Narrative:     Collection has been rescheduled by JACIEL at 08/08/2020 06:06 Reason:   Patient has port or line  Performed at:  [address], [address]   Phone [phone]   CBC WITH AUTO DIFFERENTIAL - Abnormal; Notable for the following components:    RBC 3.39 (*)     Hemoglobin 11.2 (*)     Hematocrit 33.7 (*)     All other components within normal limits    Narrative:     Collection has been rescheduled by Toopher at 08/08/2020 06:06 Reason:   Patient has port or line  Performed at:  [address], [facility]   Phone [phone]   POCT GLUCOSE - Abnormal; Notable for the following components:    POC Glucose 295 (*)     All other components within normal limits    Narrative:     Performed at:  [address], [facility] [address]   Phone [phone]   POCT GLUCOSE - Abnormal; Notable for the following components:    POC Glucose 404 (*)     All other components within normal limits    Narrative:     Performed at:  [address], [address]   Phone [phone]   POCT GLUCOSE - Abnormal; Notable for the following components:    POC Glucose 215 (*)     All other components within normal limits    Narrative:     Performed at:  [address], [address]   Phone [phone]   POCT GLUCOSE - Abnormal; Notable for the following components:    POC Glucose 224 (*)     All other components within normal limits    Narrative:     Performed at:  [facility]  [address] Phone [phone]   POCT GLUCOSE - Abnormal; Notable for the following components:    POC Glucose 156 (*)     All other components within normal limits    Narrative:     Performed at:  [place] [address] [address]   Phone [phone]   POCT GLUCOSE - Abnormal; Notable for the following components:    POC Glucose 142 (*)     All other components within normal limits    Narrative:     Performed at:  [place] [address] [address]   Phone [phone]   POCT GLUCOSE - Abnormal; Notable for the following components:    POC Glucose 177 (*)     All other components within normal limits    Narrative:     Performed at:  [place] [address] [address]   Phone [phone]   HEMOGLOBIN A1C    Narrative:     Performed at:  [place] [address]   Phone [phone]    Narrative:     Performed at:  [place] [address] [employer]   Phone [phone]   POCT GLUCOSE   POCT GLUCOSE   POCT GLUCOSE   POCT GLUCOSE   POCT GLUCOSE   POCT GLUCOSE   POCT GLUCOSE   POCT GLUCOSE   POCT GLUCOSE   POCT GLUCOSE   POCT GLUCOSE   POCT GLUCOSE   POCT GLUCOSE   POCT GLUCOSE   POCT GLUCOSE       All other labs were within normal range or not returned as of this dictation. EKG: All EKG's are interpreted by the Emergency Department Physician in the absence of a cardiologist.  Please see their note for interpretation of EKG.       RADIOLOGY:   Non-plain film images such as CT, Ultrasound and MRI are read by the radiologist. Plain radiographic images are visualized and preliminarily interpreted by the ED Provider with the below findings:        Interpretation per the Radiologist below, if available at the time of this note:    XR SHOULDER LEFT (MIN 2 motion present, distal pulses are intact. Heart with RRR. Lungs are clear to auscultation. Abdomen is soft, non-tender and non-distended. Left hip with pain to palpation and decreased range of motion. No deformity noted. Left ankle with pain noted to the lateral and medial malleolus, swelling present. Decreased range of motion present. Distal pulses are intact  Differentials: fall, intracranial hemorrhage, skull fracture, cervical fracture, shoulder dislocation, humerus fracture, hip fracture, pelvic fracture, ankle fracture  Xray ankle: negative  Xray shoulder: no acute findings  CT head: no acute intracranial abnormality  CT cervical: No acute abnormality of the cervical spine. Mild degenerative and other findings, as above. CT pelvis: No acute abnormality of the left hip. Dilaudid given for pain with minimal improvement of pain. Patient placed in ace wrap to the left ankle for comfort    Patient unable to ambulate and will need admission for placement  Diagnosis: fall at home, left ankle sprain, closed head injury, cervical strain, left shoulder pain, left hip pain    FINAL IMPRESSION      1. Fall in home, initial encounter    2. Closed head injury, initial encounter    3. Strain of neck muscle, initial encounter    4. Left hip pain    5. Acute pain of left shoulder    6. Sprain of left ankle, unspecified ligament, initial encounter    7. Inability to ambulate due to ankle or foot          DISPOSITION/PLAN   DISPOSITION        PATIENT REFERREDTO:  Thaddeus Wu MD  [id number] [place] [id number]  [phone]            DISCHARGE MEDICATIONS:  Discharge Medication List as of 8/11/2020  1:57 PM      START taking these medications    Details   oxyCODONE-acetaminophen (PERCOCET) 5-325 MG per tablet Take 1 tablet by mouth every 6 hours as needed for Pain for up to 3 days. , Disp-10 tablet,R-0Print             DISCONTINUED MEDICATIONS:  Discharge Medication List as of 8/11/2020  1:57 PM (Please note that portions of this note were completed with a voice recognition program.  Efforts were made to edit the dictations but occasionally words are mis-transcribed.)    CHIQUITA Butts CNP (electronically signed)            CHIQUITA Butts CNP  08/11/20 1135 CHIQUITA Arora CNP  08/20/20 3384

## 2020-08-08 NOTE — ED NOTES
Ps  [facility] @ 5510   Re: fall at home, inability to ambulate, left ankle sprain, closed head injury, cervical strain, left hip pain, left shoulder pain  Np kayden @ [address]  08/07/20 9197

## 2020-08-09 LAB
ESTIMATED AVERAGE GLUCOSE: 200.1 MG/DL
GLUCOSE BLD-MCNC: 164 MG/DL (ref 70–99)
GLUCOSE BLD-MCNC: 232 MG/DL (ref 70–99)
GLUCOSE BLD-MCNC: 368 MG/DL (ref 70–99)
GLUCOSE BLD-MCNC: 372 MG/DL (ref 70–99)
HBA1C MFR BLD: 8.6 %
PERFORMED ON: ABNORMAL

## 2020-08-09 PROCEDURE — G0378 HOSPITAL OBSERVATION PER HR: HCPCS

## 2020-08-09 PROCEDURE — 96372 THER/PROPH/DIAG INJ SC/IM: CPT

## 2020-08-09 PROCEDURE — 6370000000 HC RX 637 (ALT 250 FOR IP): Performed by: NURSE PRACTITIONER

## 2020-08-09 PROCEDURE — 6360000002 HC RX W HCPCS: Performed by: NURSE PRACTITIONER

## 2020-08-09 PROCEDURE — 6370000000 HC RX 637 (ALT 250 FOR IP): Performed by: INTERNAL MEDICINE

## 2020-08-09 PROCEDURE — 2580000003 HC RX 258: Performed by: NURSE PRACTITIONER

## 2020-08-09 RX ORDER — INSULIN GLARGINE 100 [IU]/ML
30 INJECTION, SOLUTION SUBCUTANEOUS 2 TIMES DAILY
Status: DISCONTINUED | OUTPATIENT
Start: 2020-08-09 | End: 2020-08-11 | Stop reason: HOSPADM

## 2020-08-09 RX ORDER — OXYCODONE HYDROCHLORIDE AND ACETAMINOPHEN 5; 325 MG/1; MG/1
1 TABLET ORAL EVERY 6 HOURS PRN
Qty: 10 TABLET | Refills: 0 | Status: SHIPPED | OUTPATIENT
Start: 2020-08-09 | End: 2020-08-12

## 2020-08-09 RX ORDER — INSULIN GLARGINE 100 [IU]/ML
28 INJECTION, SOLUTION SUBCUTANEOUS 2 TIMES DAILY
Qty: 5 PEN | Refills: 3
Start: 2020-08-09

## 2020-08-09 RX ADMIN — MICONAZOLE NITRATE: 20 POWDER TOPICAL at 21:21

## 2020-08-09 RX ADMIN — GABAPENTIN 300 MG: 300 CAPSULE ORAL at 09:24

## 2020-08-09 RX ADMIN — MICONAZOLE NITRATE: 20 POWDER TOPICAL at 09:26

## 2020-08-09 RX ADMIN — OXYCODONE HYDROCHLORIDE AND ACETAMINOPHEN 1 TABLET: 5; 325 TABLET ORAL at 21:21

## 2020-08-09 RX ADMIN — OXYCODONE HYDROCHLORIDE AND ACETAMINOPHEN 1 TABLET: 5; 325 TABLET ORAL at 14:49

## 2020-08-09 RX ADMIN — GABAPENTIN 300 MG: 300 CAPSULE ORAL at 21:21

## 2020-08-09 RX ADMIN — HEPARIN SODIUM 5000 UNITS: 5000 INJECTION INTRAVENOUS; SUBCUTANEOUS at 09:30

## 2020-08-09 RX ADMIN — METHOCARBAMOL TABLETS 500 MG: 500 TABLET, COATED ORAL at 21:21

## 2020-08-09 RX ADMIN — INSULIN LISPRO 2 UNITS: 100 INJECTION, SOLUTION INTRAVENOUS; SUBCUTANEOUS at 18:44

## 2020-08-09 RX ADMIN — HEPARIN SODIUM 5000 UNITS: 5000 INJECTION INTRAVENOUS; SUBCUTANEOUS at 14:50

## 2020-08-09 RX ADMIN — METHOCARBAMOL TABLETS 500 MG: 500 TABLET, COATED ORAL at 09:24

## 2020-08-09 RX ADMIN — INSULIN LISPRO 10 UNITS: 100 INJECTION, SOLUTION INTRAVENOUS; SUBCUTANEOUS at 12:20

## 2020-08-09 RX ADMIN — TIMOLOL MALEATE 1 DROP: 2.5 SOLUTION/ DROPS OPHTHALMIC at 21:22

## 2020-08-09 RX ADMIN — Medication 10 ML: at 09:27

## 2020-08-09 RX ADMIN — TIMOLOL MALEATE 1 DROP: 2.5 SOLUTION/ DROPS OPHTHALMIC at 09:26

## 2020-08-09 RX ADMIN — SERTRALINE HYDROCHLORIDE 200 MG: 50 TABLET ORAL at 21:21

## 2020-08-09 RX ADMIN — ATORVASTATIN CALCIUM 40 MG: 40 TABLET, FILM COATED ORAL at 09:24

## 2020-08-09 RX ADMIN — INSULIN GLARGINE 30 UNITS: 100 INJECTION, SOLUTION SUBCUTANEOUS at 12:21

## 2020-08-09 RX ADMIN — INSULIN LISPRO 2 UNITS: 100 INJECTION, SOLUTION INTRAVENOUS; SUBCUTANEOUS at 21:21

## 2020-08-09 RX ADMIN — INSULIN LISPRO 10 UNITS: 100 INJECTION, SOLUTION INTRAVENOUS; SUBCUTANEOUS at 09:31

## 2020-08-09 RX ADMIN — INSULIN GLARGINE 30 UNITS: 100 INJECTION, SOLUTION SUBCUTANEOUS at 21:22

## 2020-08-09 RX ADMIN — HEPARIN SODIUM 5000 UNITS: 5000 INJECTION INTRAVENOUS; SUBCUTANEOUS at 21:21

## 2020-08-09 RX ADMIN — GABAPENTIN 300 MG: 300 CAPSULE ORAL at 14:50

## 2020-08-09 RX ADMIN — MONTELUKAST SODIUM 10 MG: 10 TABLET, FILM COATED ORAL at 21:21

## 2020-08-09 ASSESSMENT — PAIN DESCRIPTION - PROGRESSION
CLINICAL_PROGRESSION: NOT CHANGED
CLINICAL_PROGRESSION: GRADUALLY WORSENING
CLINICAL_PROGRESSION: GRADUALLY WORSENING
CLINICAL_PROGRESSION: NOT CHANGED
CLINICAL_PROGRESSION: GRADUALLY WORSENING
CLINICAL_PROGRESSION: NOT CHANGED
CLINICAL_PROGRESSION: GRADUALLY WORSENING

## 2020-08-09 ASSESSMENT — PAIN SCALES - WONG BAKER
WONGBAKER_NUMERICALRESPONSE: 0

## 2020-08-09 ASSESSMENT — PAIN DESCRIPTION - PAIN TYPE: TYPE: ACUTE PAIN

## 2020-08-09 ASSESSMENT — PAIN SCALES - GENERAL
PAINLEVEL_OUTOF10: 10
PAINLEVEL_OUTOF10: 0
PAINLEVEL_OUTOF10: 7
PAINLEVEL_OUTOF10: 7
PAINLEVEL_OUTOF10: 0

## 2020-08-09 ASSESSMENT — PAIN DESCRIPTION - ORIENTATION: ORIENTATION: LEFT

## 2020-08-09 ASSESSMENT — PAIN DESCRIPTION - LOCATION: LOCATION: ANKLE

## 2020-08-09 NOTE — PROGRESS NOTES
Pt was transferring from bed to bedside commode when she couldn't bear her own weight anymore or get back to bed so staff assisted her to sit on the floor to take a minute to catch her breath and get the sanjiv lift to lift her back to bed because pt was totally unable to stand up again. RN had discussion with pt about what would she have done if she was at home when this happened and pt is now more accepting for SNF. CM and hospitalist aware however pt was d/c today already just didn't have the proper transport to get pt back home.

## 2020-08-09 NOTE — DISCHARGE INSTR - COC
Continuity of Care Form    Patient Name: Luna Pierre   :  1960  MRN:  [id number]    Admit date:  2020  Discharge date:  20    Code Status Order: Limited   Advance Directives:   [facility] Documentation     Date/Time Healthcare Directive Type of Healthcare Directive Copy in [address] Agent's Name Healthcare Agent's Phone Number    20 0000  Yes, patient has an advance directive for healthcare treatment  --  Other (Comment) Pt states we have copy here - she has given it to us several differen times  --  --  --          Admitting Physician:  Baron Martinez MD  PCP: Chanelle Rodriguez MD    Discharging Nurse: [address]  Unit/Room#: 0271/3668-68  Discharging Unit Phone Number: [phone]    Emergency Contact:   Extended Emergency Contact Information  Primary Emergency Contact: Carlos Garcia  Address: [facility]           [phone]   [address] Phone: [phone]  Relation: Brother/Sister  Secondary Emergency Contact: [facility] Phone: [phone]  Relation: Brother/Sister    Past Surgical History:  Past Surgical History:   Procedure Laterality Date    ABDOMEN SURGERY      ABDOMINAL EXPLORATION SURGERY  13    EXPLORATORY LAPAROTOMY LYSIS OF ADHESIONS    APPENDECTOMY      CARPAL TUNNEL RELEASE      both hands    CATARACT REMOVAL WITH IMPLANT  11    right    CATARACT REMOVAL WITH IMPLANT  2011    left eye     SECTION      COLONOSCOPY      DILATATION, ESOPHAGUS      ENDOSCOPY, COLON, DIAGNOSTIC      EYE SURGERY      laser, vitrectomy ou, cataract ou    FRACTURE SURGERY      HYSTERECTOMY      partial    JOINT REPLACEMENT      both knees    OTHER SURGICAL HISTORY  8/15/11    Left Shoulder Decompression    SHOULDER SURGERY      right     SUBTOTAL COLECTOMY  2000    TONSILLECTOMY      TUNNELED VENOUS PORT PLACEMENT  11    TUNNELED VENOUS PORT PLACEMENT  3/1/13    [place] InhibOx History: There is no immunization history for the selected administration types on file for this patient. Active Problems:  Patient Active Problem List   Diagnosis Code    Diabetes mellitus ([facility] E11.9    Essential hypertension I10    Vomiting R11.10    Morbid obesity due to excess calories ([facility] E66.01    Chronic pain associated with significant psychosocial dysfunction G89.4    SBO (small bowel obstruction) ([facility] Z45.703    Metabolic encephalopathy C18.48    Chronic kidney disease, stage 3, mod decreased GFR ([facility]) N18.3    HLD (hyperlipidemia) E78.5    Contusion of toe, right S90.121A    Degenerative arthritis of thumb M18.10    Tendinitis of left wrist M77.8    Hand pain M79.643    Heel pain, chronic M79.673, G89.29    Pain in joint, lower leg M25.569    Knee joint replacement status Z96.659    Contusion of knee S80.00XA    Plantar fasciitis, bilateral M72.2    Bilateral chronic knee pain M25.561, M25.562, G89.29    Back pain M54.9    Lumbar sprain S33. 5XXA    Knee MCL sprain S83.419A    DDD (degenerative disc disease), lumbar M51.36    Acute pain of left shoulder M25.512    Right wrist pain M25.531    Right wrist sprain S63.501A    Rotator cuff strain S46.019A    Contusion of left shoulder S40.012A    Pain in scapula M89.8X1    Acute pain of right shoulder M25.511    DKA (diabetic ketoacidoses) ([facility]) E11.10    THEODORE (acute kidney injury) ([facility] N17.9    NSTEMI (non-ST elevated myocardial infarction) ([facility] I21.4    Diabetic ketoacidosis with coma ([facility]) E11.11    Non-traumatic rhabdomyolysis M62.82    Cervical pain M54.2    Closed avulsion fracture of lateral malleolus of left fibula S82. 62XA    Pain of right thumb M79.644    Acute left ankle pain M25.572    Gamekeeper's thumb of right hand S53. 31XA    Frequent falls R29.6       Isolation/Infection:   Isolation          No Isolation        Patient Infection Status     Infection Onset Added Last Indicated Last Indicated By Review Planned Expiration Resolved Resolved By    None active    Resolved    C-diff (Clostridium difficile) (RETIRED)  04/11/11 04/11/11 Nicolas Garcia RN   01/16/13 Heron Mcmillan RN          Nurse Assessment:  Last Vital Signs: /65   Pulse 79   Temp 98.4 °F (36.9 °C) (Oral)   Resp 18   Ht 5' 4\" (1.626 m)   Wt (!) 372 lb 2.2 oz (168.8 kg)   SpO2 93%   BMI 63.88 kg/m²     Last documented pain score (0-10 scale): Pain Level: 0  Last Weight:   Wt Readings from Last 1 Encounters:   08/08/20 (!) 372 lb 2.2 oz (168.8 kg)     Mental Status:  oriented and alert    IV Access:  - Right Chest port    Nursing Mobility/ADLs:  Walking   Dependent  Transfer  Dependent  Bathing  Dependent  Dressing  Dependent  Toileting  Dependent  Feeding  Assisted  Med Admin  Assisted  Med Delivery   whole and crushed    Wound Care Documentation and Therapy:        Elimination:  Continence:   · Bowel: at times   · Bladder: Yes  Urinary Catheter: None   Colostomy/Ileostomy/Ileal Conduit: No       Date of Last BM: 8/11/20    Intake/Output Summary (Last 24 hours) at 8/9/2020 0804  Last data filed at 8/9/2020 0653  Gross per 24 hour   Intake 240 ml   Output 1500 ml   Net -1260 ml     I/O last 3 completed shifts: In: 240 [P.O.:240]  Out: 1500 [Urine:1500]    Safety Concerns:     History of Falls (last 30 days) and At Risk for Falls    Impairments/Disabilities:      None    Nutrition Therapy:  Current Nutrition Therapy:   - Oral Diet:  Renal    Routes of Feeding: Oral  Liquids: No Restrictions  Daily Fluid Restriction: no  Last Modified Barium Swallow with Video (Video Swallowing Test): not done    Treatments at the Time of Hospital Discharge:   Respiratory Treatments:   Oxygen Therapy:  is not on home oxygen therapy.   Ventilator:    - No ventilator support    Rehab Therapies: Physical Therapy and Occupational Therapy  Weight Bearing Status/Restrictions: No weight bearing restirctions  Other Medical Equipment (for information only, NOT a DME order):  stedy  Other Treatments:     Patient's personal belongings (please select all that are sent with patient):  None    RN SIGNATURE:  Electronically signed by Lesli Muñoz RN on 8/11/20 at 1:56 PM EDT    CASE MANAGEMENT/SOCIAL WORK SECTION    Inpatient Status Date:     Readmission Risk Assessment Score:  Readmission Risk              Risk of Unplanned Readmission:        0           Discharging to Facility/ Agency   · Name: [facility]  · Address:  · Phone: [phone]  · Fax: [phone]    Dialysis Facility (if applicable)   · Name:  · Address:  · Dialysis Schedule:  · Phone:  · Fax:    / signature:Electronically signed by MALIKA Zhang, MASTERW on 8/11/20 at 10:59 AM EDT        PHYSICIAN SECTION    Prognosis: {Prognosis:[id number]}    Condition at Discharge: Stable    Rehab Potential (if transferring to Rehab): Good    Recommended Labs or Other Treatments After Discharge: Follow up with PCP within 1-2 weeks. Physician Certification: I certify the above information and transfer of Tori Mcconnell  is necessary for the continuing treatment of the diagnosis listed and that she requires Home Care for less 30 days.      Update Admission H&P: No change in H&P    PHYSICIAN SIGNATURE:  Electronically signed by Meme Desai MD on 8/9/20 at 8:04 AM EDT

## 2020-08-09 NOTE — PROGRESS NOTES
Occupational Therapy    Chart review, attempted to see pt for follow up treatment this date; pt refused, \"I walked to the bathroom already this morning & it hurt like h..l, I need to save my energy for when I go home today. \"    Valorie Carlin, OT

## 2020-08-09 NOTE — DISCHARGE SUMMARY
Hospital Medicine progress note (didn't have transportation)    Patient ID: Alfredo Oden      Patient's PCP: Steffen Oliva MD    Admit Date: 8/7/2020     Discharge Date:   08/09/20     Admitting Physician: Kolby Conway MD     Discharge Physician: Danyel Castellanos MD     Discharge Diagnoses: Active Hospital Problems    Diagnosis    Morbid obesity due to excess calories ([facility] [E66.01]     Priority: Medium     Class: Present on Admission    Diabetes mellitus ([address] [facility] [address] [E11.9]     Priority: Medium     Class: Present on Admission    Essential hypertension [I10]     Priority: Medium     Class: Present on Admission    Frequent falls [R29.6]    HLD (hyperlipidemia) [E78.5]    Chronic kidney disease, stage 3, mod decreased GFR ([facility]) [N18.3]       The patient was seen and examined on day of discharge and this discharge summary is in conjunction with any daily progress note from day of discharge. Hospital Course:  \"59 y.o. female, with PMH of HLD, DM2, frequent falls, who presented to [facility] with mechanical fall. The patient states that she typically uses her rolling walker to get around by sitting on it and rolling. While using it this evening, her legs gave out on her and she landed on her left side, rolling her left ankle in the process. She also complains of pain in her left hip, neck, and her left shoulder. She states that she was charging her med-alert necklace when she fell and waited about an hour on the floor until someone came to get her. She currently lives alone as she just moved her parents into nursing homes recently. The patient states she is unable to put any pressure on the left ankle due to severe pain. The patient states she has no interest in SNF or ECF placement at all. She currently works with [employer] [facility] [employer] and has nursing come out a few times a week. \"        Muscular deconditioning, ataxia, frequent falls  - PT/OT rec'd SNF.   She cannot walk effectively. 4 falls in the last two weeks. She knows she will continue to fall if she goes home, and that this can potentially have deadly consequences. She still wants to go home. I told her she will almost certainly be readmitted. CM consulted, she couldn't talk her into SNF either. Kept the patient in observation an extra day just to let her think it over some more, she still wanted to go home the next day. She understands that she is allowed to make exceptionally bad decisions. CKD, stage 3. Baseline Cr ~1.6-1.8.  - Stable Cr at 1.7  - Avoid nephrotoxins  - Monitored with intermittent BMPs     DM2, uncontrolled. -  on admission  - Hemoglobin a1c pending  - MDSSI + lantus  - POCT ac/hs  - Hypoglycemia protocol  - Carb control diet     Morbid Obesity -  With Body mass index is 76.74 kg/m². Complicating assessment and treatment. Placing patient at risk for multiple co-morbidities as well as early death and contributing to the patient's presentation. Counseled on weight loss     Hx of HLD, well controlled with statin.  - Continue home Lipitor  - Follow-up with PCP for med adjustments     Anxiety/depression, mood is stable.  Continue home zoloft.     Hx of asthma, does not appear to be in acute exacerbation.  Continue home singulair. Did not note any nebs/inhalers on home med list.          Physical Exam Performed:     /65   Pulse 79   Temp 98.4 °F (36.9 °C) (Oral)   Resp 18   Ht 5' 4\" (1.626 m)   Wt (!) 372 lb 2.2 oz (168.8 kg)   SpO2 93%   BMI 63.88 kg/m²       General appearance:  No apparent distress, appears stated age and cooperative. HEENT:  Normal cephalic, atraumatic without obvious deformity. Pupils equal, round, and reactive to light. Extra ocular muscles intact. Conjunctivae/corneas clear. Neck: Supple, with full range of motion. No jugular venous distention. Trachea midline. Respiratory:  Normal respiratory effort.  Clear to auscultation, bilaterally without Rales/Wheezes/Rhonchi. Cardiovascular:  Regular rate and rhythm with normal S1/S2 without murmurs, rubs or gallops. Abdomen: Soft, non-tender, non-distended with normal bowel sounds. Musculoskeletal:  No clubbing, cyanosis or edema bilaterally. Full range of motion without deformity. Skin: Skin color, texture, turgor normal.  No rashes or lesions. Neurologic:  Neurovascularly intact without any focal sensory/motor deficits. Cranial nerves: II-XII intact, grossly non-focal.  Psychiatric:  Alert and oriented, thought content appropriate, normal insight  Capillary Refill: Brisk,< 3 seconds   Peripheral Pulses: +2 palpable, equal bilaterally       Labs: For convenience and continuity at follow-up the following most recent labs are provided:      CBC:    Lab Results   Component Value Date    WBC 8.2 08/08/2020    HGB 11.2 08/08/2020    HCT 33.7 08/08/2020     08/08/2020       Renal:    Lab Results   Component Value Date     08/08/2020    K 5.1 08/08/2020     08/08/2020    CO2 26 08/08/2020    BUN 41 08/08/2020    CREATININE 1.6 08/08/2020    CALCIUM 9.2 08/08/2020    PHOS 3.2 06/26/2020         Significant Diagnostic Studies    Radiology:   XR SHOULDER LEFT (MIN 2 VIEWS)   Final Result   No acute findings         CT HEAD WO CONTRAST   Final Result   No acute intracranial abnormality. CT CERVICAL SPINE WO CONTRAST   Final Result   No acute abnormality of the cervical spine. Mild degenerative and other findings, as above. CT PELVIS WO CONTRAST Additional Contrast? None   Final Result   No acute abnormality of the left hip. XR ANKLE LEFT (MIN 3 VIEWS)   Final Result   No acute findings involving the left hip were ankle. XR HIP 2-3 VW W PELVIS LEFT   Final Result   No acute findings involving the left hip were ankle.                 Consults:     IP CONSULT TO HOSPITALIST  IP CONSULT TO CASE MANAGEMENT  IP CONSULT TO SPIRITUAL SERVICES  IP CONSULT TO CASE MANAGEMENT  IP CONSULT TO HOME CARE NEEDS    Disposition:  home with PT/OT    Condition at Discharge: Stable    Discharge Instructions/Follow-up:  Follow up with PCP within 1-2 weeks. Code Status:  Limited     Activity: activity as tolerated    Diet: diabetic diet      Discharge Medications:     Current Discharge Medication List           Details   oxyCODONE-acetaminophen (PERCOCET) 5-325 MG per tablet Take 1 tablet by mouth every 6 hours as needed for Pain for up to 3 days. Qty: 10 tablet, Refills: 0    Comments: Reduce doses taken as pain becomes manageable  Associated Diagnoses: Left hip pain              Details   !! insulin glargine (BASAGLAR KWIKPEN) 100 UNIT/ML injection pen Inject 28 Units into the skin 2 times daily Before breakfast and dinner  Qty: 5 pen, Refills: 3       !! - Potential duplicate medications found. Please discuss with provider. Details   !! insulin glargine (BASAGLAR KWIKPEN) 100 UNIT/ML injection pen Inject into the skin nightly      methocarbamol (ROBAXIN) 500 MG tablet TAKE ONE TABLET BY MOUTH TWICE A DAY  Qty: 60 tablet, Refills: 0    Associated Diagnoses: Acute left ankle pain; Pain of right thumb      diazepam (VALIUM) 10 MG tablet take 1 tablet by oral route  every bedtime      atorvastatin (LIPITOR) 40 MG tablet Take 40 mg by mouth      Misc. Devices (WALKER) MISC 1 each by Does not apply route daily  Qty: 1 each, Refills: 0      insulin aspart (NOVOLOG FLEXPEN) 100 UNIT/ML injection pen Continue with your current aspart insulin regimen (fairly complex aspart system which is a combination of SSI, carb counting, scheduled post-meal insulin, and adjustments based on her level of nausea at the time - about 40u/day). Qty: 5 Pen, Refills: 3      timolol (BETIMOL) 0.25 % ophthalmic solution 1-2 drops 2 times daily      Cholecalciferol (VITAMIN D3) 2000 UNITS CAPS Take  by mouth daily. Cyanocobalamin (VITAMIN B 12 PO) Take 1 tablet by mouth daily.       L-Lysine 500 MG CAPS Take  by mouth 4 times daily as needed. sertraline (ZOLOFT) 100 MG tablet Take 200 mg by mouth nightly. lactulose (CHRONULAC) 10 GM/15ML solution Take 20 g by mouth daily. ketorolac (ACULAR LS) 0.4 % SOLN ophthalmic solution 1 drop daily. olopatadine (PATANOL) 0.1 % ophthalmic solution 1 drop 2 times daily. medroxyPROGESTERone (PROVERA) 2.5 MG tablet Take 10 mg by mouth daily. montelukast (SINGULAIR) 10 MG tablet Take 10 mg by mouth nightly. !! - Potential duplicate medications found. Please discuss with provider. Time Spent on discharge is more than 30 minutes in the examination, evaluation, counseling and review of medications and discharge plan. Signed:    Lynn Ramos MD   8/9/2020      Thank you Noemi Rojo MD for the opportunity to be involved in this patient's care. If you have any questions or concerns please feel free to contact me at [phone].

## 2020-08-09 NOTE — CARE COORDINATION
CM trying to arrange transport home for patient. [facility] can not at this time but will call back around 1400 to see if they will have another crew to assist.  [facility]- unable to assist today  [facility]- not able today  [facility]- no answer  [facility]- no answer  [facility] [employer]- no runs on Sunday at this time  [facility] Transport services- not today  [address]- number out of order  [place]- not today  [address] care- no     ADDENDUM 1424: [facility] unable to take patient and would not schedule for Monday. CM will try another transport service to arrange transport for AM.     ADDENDUM 1454: [facility] unable to set up transport for tomorrow, today. [facility] said call first thing in the morning and they should be able to transport.

## 2020-08-10 LAB
GLUCOSE BLD-MCNC: 147 MG/DL (ref 70–99)
GLUCOSE BLD-MCNC: 156 MG/DL (ref 70–99)
GLUCOSE BLD-MCNC: 190 MG/DL (ref 70–99)
GLUCOSE BLD-MCNC: 248 MG/DL (ref 70–99)
PERFORMED ON: ABNORMAL

## 2020-08-10 PROCEDURE — 6360000002 HC RX W HCPCS: Performed by: NURSE PRACTITIONER

## 2020-08-10 PROCEDURE — G0378 HOSPITAL OBSERVATION PER HR: HCPCS

## 2020-08-10 PROCEDURE — 97110 THERAPEUTIC EXERCISES: CPT

## 2020-08-10 PROCEDURE — 6370000000 HC RX 637 (ALT 250 FOR IP): Performed by: INTERNAL MEDICINE

## 2020-08-10 PROCEDURE — 96372 THER/PROPH/DIAG INJ SC/IM: CPT

## 2020-08-10 PROCEDURE — 97530 THERAPEUTIC ACTIVITIES: CPT

## 2020-08-10 PROCEDURE — 6370000000 HC RX 637 (ALT 250 FOR IP): Performed by: NURSE PRACTITIONER

## 2020-08-10 PROCEDURE — 97535 SELF CARE MNGMENT TRAINING: CPT

## 2020-08-10 RX ADMIN — INSULIN LISPRO 1 UNITS: 100 INJECTION, SOLUTION INTRAVENOUS; SUBCUTANEOUS at 21:39

## 2020-08-10 RX ADMIN — OXYCODONE HYDROCHLORIDE AND ACETAMINOPHEN 1 TABLET: 5; 325 TABLET ORAL at 09:43

## 2020-08-10 RX ADMIN — METHOCARBAMOL TABLETS 500 MG: 500 TABLET, COATED ORAL at 21:39

## 2020-08-10 RX ADMIN — OXYCODONE HYDROCHLORIDE AND ACETAMINOPHEN 1 TABLET: 5; 325 TABLET ORAL at 16:40

## 2020-08-10 RX ADMIN — HEPARIN SODIUM 5000 UNITS: 5000 INJECTION INTRAVENOUS; SUBCUTANEOUS at 14:31

## 2020-08-10 RX ADMIN — INSULIN LISPRO 4 UNITS: 100 INJECTION, SOLUTION INTRAVENOUS; SUBCUTANEOUS at 16:40

## 2020-08-10 RX ADMIN — INSULIN GLARGINE 30 UNITS: 100 INJECTION, SOLUTION SUBCUTANEOUS at 08:33

## 2020-08-10 RX ADMIN — GABAPENTIN 300 MG: 300 CAPSULE ORAL at 08:33

## 2020-08-10 RX ADMIN — HEPARIN SODIUM 5000 UNITS: 5000 INJECTION INTRAVENOUS; SUBCUTANEOUS at 06:05

## 2020-08-10 RX ADMIN — INSULIN GLARGINE 30 UNITS: 100 INJECTION, SOLUTION SUBCUTANEOUS at 21:40

## 2020-08-10 RX ADMIN — MONTELUKAST SODIUM 10 MG: 10 TABLET, FILM COATED ORAL at 21:39

## 2020-08-10 RX ADMIN — INSULIN LISPRO 2 UNITS: 100 INJECTION, SOLUTION INTRAVENOUS; SUBCUTANEOUS at 12:36

## 2020-08-10 RX ADMIN — MICONAZOLE NITRATE: 20 POWDER TOPICAL at 21:39

## 2020-08-10 RX ADMIN — SERTRALINE HYDROCHLORIDE 200 MG: 50 TABLET ORAL at 21:39

## 2020-08-10 RX ADMIN — ATORVASTATIN CALCIUM 40 MG: 40 TABLET, FILM COATED ORAL at 08:33

## 2020-08-10 RX ADMIN — TIMOLOL MALEATE 1 DROP: 2.5 SOLUTION/ DROPS OPHTHALMIC at 21:42

## 2020-08-10 RX ADMIN — MICONAZOLE NITRATE: 20 POWDER TOPICAL at 08:34

## 2020-08-10 RX ADMIN — GABAPENTIN 300 MG: 300 CAPSULE ORAL at 14:31

## 2020-08-10 RX ADMIN — METHOCARBAMOL TABLETS 500 MG: 500 TABLET, COATED ORAL at 08:33

## 2020-08-10 RX ADMIN — OXYCODONE HYDROCHLORIDE AND ACETAMINOPHEN 1 TABLET: 5; 325 TABLET ORAL at 03:29

## 2020-08-10 RX ADMIN — GABAPENTIN 300 MG: 300 CAPSULE ORAL at 21:39

## 2020-08-10 RX ADMIN — TIMOLOL MALEATE 1 DROP: 2.5 SOLUTION/ DROPS OPHTHALMIC at 08:34

## 2020-08-10 RX ADMIN — HEPARIN SODIUM 5000 UNITS: 5000 INJECTION INTRAVENOUS; SUBCUTANEOUS at 21:40

## 2020-08-10 RX ADMIN — INSULIN LISPRO 2 UNITS: 100 INJECTION, SOLUTION INTRAVENOUS; SUBCUTANEOUS at 08:33

## 2020-08-10 ASSESSMENT — PAIN SCALES - GENERAL
PAINLEVEL_OUTOF10: 10
PAINLEVEL_OUTOF10: 9

## 2020-08-10 ASSESSMENT — PAIN DESCRIPTION - ORIENTATION: ORIENTATION: LEFT;RIGHT

## 2020-08-10 ASSESSMENT — PAIN DESCRIPTION - PAIN TYPE: TYPE: ACUTE PAIN

## 2020-08-10 ASSESSMENT — PAIN DESCRIPTION - LOCATION: LOCATION: ANKLE

## 2020-08-10 NOTE — PROGRESS NOTES
Physical Therapy  Facility/Department: [facility] C5 - MED SURG/ORTHO  Daily Treatment Note  NAME: Mel Morris  : 1960  MRN: [id number]    Date of Service: 8/10/2020    Discharge Recommendations:  Subacute/Skilled Nursing Facility, 3-5 sessions per week   PT Equipment Recommendations  Equipment Needed: No    Assessment   Body structures, Functions, Activity limitations: Decreased functional mobility ; Decreased endurance;Decreased ROM; Decreased strength;Decreased safe awareness; Increased pain  Assessment: Pt currently functioning below baseline and not safe to return home alone. Pt agreeable to PT/OT cotx for functional mobility training with encouragement. Pt unable to achieve a full stand from EOB as she was limited by pain. Cont to require A x2 for all mobility. Rec pt dc to SNF prior to returning home to improve strength, endurance and independence with functional mobility. Treatment Diagnosis: Impaired functional mobility  Prognosis: Fair  Decision Making: Medium Complexity  Barriers to Learning: none  REQUIRES PT FOLLOW UP: Yes  Activity Tolerance  Activity Tolerance: Patient limited by pain; Patient limited by fatigue     Patient Diagnosis(es): The primary encounter diagnosis was Fall in home, initial encounter. Diagnoses of Closed head injury, initial encounter, Strain of neck muscle, initial encounter, Left hip pain, Acute pain of left shoulder, Sprain of left ankle, unspecified ligament, initial encounter, and Inability to ambulate due to ankle or foot were also pertinent to this visit.      has a past medical history of Acid reflux, Acute renal failure ([address] Anemia, Arthritis, Asthma, Bronchitis, Bursitis, C. difficile colitis, Diabetes mellitus ([address] Herpes, History of blood transfusion, Hypercholesteremia, Hypertension, Migraine, Neuropathy, Obesity, Psychiatric problem, Recurrent sinus infections, Renal failure, Retinopathy of both eyes, Sleep apnea, Ulcer, stomach peptic, Unspecified cerebral artery occlusion with cerebral infarction, and Unspecified diseases of blood and blood-forming organs. has a past surgical history that includes Hysterectomy; Appendectomy; Carpal tunnel release;  section; subtotal colectomy (2000); Cataract removal with implant (11); Cataract removal with implant (2011); Tunneled venous port placement (11); other surgical history (8/15/11); eye surgery; shoulder surgery; Tonsillectomy; Colonoscopy; Abdominal exploration surgery (13); joint replacement; Tunneled venous port placement (3/1/13); Abdomen surgery; Endoscopy, colon, diagnostic; Dilatation, esophagus; and fracture surgery. Restrictions  Restrictions/Precautions  Restrictions/Precautions: General Precautions, Fall Risk  Position Activity Restriction  Other position/activity restrictions: up with assist  Subjective   General  Chart Reviewed: Yes  Additional Pertinent Hx: HTN, HLD, migraines, OA, asthma, renal failure, acid reflux, bronchitis, DM II, neuropathy  Response To Previous Treatment: Patient with no complaints from previous session. Subjective  Subjective: Pt reporting general fear. Able to be more specific when prompted and reports fear of not getting paperwork done to get her father settled in LTC as well as fear of falling. General Comment  Comments: Cleared for session by Mirna Fleischer RN          Orientation  Orientation  Overall Orientation Status: Within Normal Limits  Cognition      Objective   Bed mobility  Rolling to Left: Dependent/Total(Max x 2)  Rolling to Right: Modified independent(Max x 2)  Supine to Sit: Dependent/Total(Max x 2)  Transfers  Sit to Stand: Unable to assess(partial stand using sw Max x2)  Stand Pivot Transfers: (from partial stand, SW, mod A x 2)  Comment: partial stand using sw Max x2. Attempted txfr from EOB to [facility]. Pt seemed to be limited by ankle pain and inability to control bowels.   Ambulation  Ambulation?: No(pt could only attain partial stand)     Balance  Comments: Sitting Balance: (Initiallyrequired mod-max A d/t posterior lean,  then pt grabbed onto OT waist to pull herself forward. Did demo static sitting EOB with CGA with cues and encouragement  Exercises  Quad Sets: 10 B  Heelslides: BLE, limited ROM x 10  Gluteal Sets: 10  Knee Short Arc Quad: BLE, limited ROM x 10  Ankle Pumps: BLE, limited ROM x 10          AM-PAC Score     AM-PAC Inpatient Mobility without Stair Climbing Raw Score : 10 (08/10/20 1047)  AM-PAC Inpatient without Stair Climbing T-Scale Score : 34.07 (08/10/20 1047)  Mobility Inpatient CMS 0-100% Score: 71.66 (08/10/20 1047)  Mobility Inpatient without Stair CMS G-Code Modifier : CL (08/10/20 1047)       Goals  Short term goals  Time Frame for Short term goals: 4 days  Short term goal 1: Patient will perform supine <> sit transfers with min A x2.  --8/10 dependent max A x2  Short term goal 2: Patient will perform sit<>stand transfers with min A x2 and SW.  --8/10 not met  Short term goal 3: Patient will perform stand pivot transfers with min A x2 and SW.  --8/10 not met  Short term goal 4: Patient will walk 10 feet with LRAD and min A x2.  --8/10 not met  Patient Goals   Patient goals : Return home    Plan    Plan  Times per week: 3-5  Times per day: Daily  Current Treatment Recommendations: Strengthening, Gait Training, ROM, Stair training, Balance Training, Neuromuscular Re-education, Functional Mobility Training, Endurance Training, Home Exercise Program, Transfer Training, Wheelchair Mobility Training, Safety Education & Training  Safety Devices  Type of devices:  All fall risk precautions in place, Call light within reach, Nurse notified, Gait belt, Bed alarm in place, Left in bed     Therapy Time   Individual Concurrent Group Co-treatment   Time In [facility]         Time Out 0934         Minutes 59         Timed Code Treatment Minutes: 0056 [facility]

## 2020-08-10 NOTE — PROGRESS NOTES
a past surgical history that includes Hysterectomy; Appendectomy; Carpal tunnel release;  section; subtotal colectomy (2000); Cataract removal with implant (11); Cataract removal with implant (2011); Tunneled venous port placement (11); other surgical history (8/15/11); eye surgery; shoulder surgery; Tonsillectomy; Colonoscopy; Abdominal exploration surgery (13); joint replacement; Tunneled venous port placement (3/1/13); Abdomen surgery; Endoscopy, colon, diagnostic; Dilatation, esophagus; and fracture surgery. Restrictions  Restrictions/Precautions  Restrictions/Precautions: General Precautions, Fall Risk  Position Activity Restriction  Other position/activity restrictions: up with assist  Subjective   General  Chart Reviewed: Yes  Patient assessed for rehabilitation services?: Yes  Family / Caregiver Present: No  Referring Practitioner: CHIQUITA Tatum NP  Diagnosis: Frequent falls, L ankle swelling; L ankle, shoulder, and hip - for acute fx. Subjective  Subjective: RN cleared pt for tx. Pt supine at session start. General Comment  Comments: Pt reporting general fear. Able to be more specific when prompted and reports fear of not getting paperwork done to get her father settled in LTC as well as fear of falling. Orientation  Orientation  Overall Orientation Status: (\"Is it 9 at night or 9 in the morning. \")  Objective    ADL  Toileting: Dependent/Total(total x2)  Additional Comments: Incont of bowel at bed level. Attempted to txfr to [facility] for toileting tasks, however pt unable to WB through BLE d/t ankle pain. Balance  Sitting Balance: (Initiallyrequired mod-max A d/t posterior lean,  then pt grabbed onto OT waist to pull herself forward.  Did demo static sitting EOB with CGA with cues and encouragement.)  Standing Balance: Unable to assess(comment)  Bed mobility  Rolling to Left: Dependent/Total(max x2)  Rolling to Right: Dependent/Total(max x2)  Supine to Sit: Dependent/Total(max x2)  Sit to Supine: Dependent/Total(max x2)  Transfers  Transfer Comments: Attempted txfr from EOB to [facility]. Pt unable to achieve a full stand. Pt seemed to be limited by ankle pain and inability to control bowels. Plan   Plan  Times per week: 3-5    AM-PAC Score  AM-PAC Inpatient Daily Activity Raw Score: 13 (08/10/20 0954)  AM-PAC Inpatient ADL T-Scale Score : 32.03 (08/10/20 0954)  ADL Inpatient CMS 0-100% Score: 63.03 (08/10/20 0954)  ADL Inpatient CMS G-Code Modifier : CL (08/10/20 4440)    Goals  Short term goals  Time Frame for Short term goals: 1 week (8/15/20) - ongoing  Short term goal 1: BSC t/f with min A x2  - ongoing  Short term goal 2: Toileting with mod A and LE AE prn  - ongoing  Short term goal 3: Bed mobility with mod A x2  - ongoing  Short term goal 4: LB dressing with mod A x2 by 8/13/20  - ongoing  Patient Goals   Patient goals : \"Be able to take care of myself and be in less pain. \"       Therapy Time   Individual Concurrent Group Co-treatment   Time In 9875         Time Out 0934         Minutes 59         Timed Code Treatment Minutes: 61 Minutes     If patient is discharged prior to next occupational therapy treatment, this note will serve as the patient's discharge summary.       Lee Ryan, OT

## 2020-08-10 NOTE — CARE COORDINATION
SW spoke with RN and patient on this day. Pt needs and is agreeable to SNF. She would like a referral to [facility]. Sw called and faxed referral. Pt will need a precert if accepted. Pt will also need a COVID test.   CM coworker ordered this.

## 2020-08-10 NOTE — PLAN OF CARE
Problem: Falls - Risk of:  Goal: Will remain free from falls  Description: Will remain free from falls  Outcome: Ongoing     Problem: Skin Integrity:  Goal: Will show no infection signs and symptoms  Description: Will show no infection signs and symptoms  Outcome: Ongoing     Problem: Pain:  Goal: Pain level will decrease  Description: Pain level will decrease  Outcome: Ongoing

## 2020-08-10 NOTE — PROGRESS NOTES
Hospitalist Progress Note      PCP: Geronimo Carlton MD    Date of Admission: 8/7/2020    Chief Complaint: Weakness    Hospital Course: Admitted with progressive weakness. Multiple chronic medical conditions with no acute exacerbation of chronic issues. Patient initially wanted to go home, later agreed to go to skilled nursing facility as recommended. Subjective: No chest pain no shortness of breath, no nausea, no vomiting. Progressive weakness. Medications:  Reviewed    Infusion Medications    dextrose       Scheduled Medications    insulin glargine  30 Units Subcutaneous BID    insulin lispro  0-12 Units Subcutaneous TID WC    insulin lispro  0-6 Units Subcutaneous Nightly    gabapentin  300 mg Oral TID    miconazole   Topical BID    atorvastatin  40 mg Oral Daily    methocarbamol  500 mg Oral BID    montelukast  10 mg Oral Nightly    sertraline  200 mg Oral Nightly    timolol  1 drop Both Eyes BID    sodium chloride flush  10 mL Intravenous 2 times per day    heparin (porcine)  5,000 Units Subcutaneous 3 times per day     PRN Meds: glucose, dextrose, glucagon (rDNA), dextrose, melatonin, sodium chloride flush, acetaminophen **OR** acetaminophen, magnesium hydroxide, promethazine **OR** ondansetron, oxyCODONE-acetaminophen      Intake/Output Summary (Last 24 hours) at 8/10/2020 1542  Last data filed at 8/10/2020 1001  Gross per 24 hour   Intake 720 ml   Output 2900 ml   Net -2180 ml       Physical Exam Performed:    /71   Pulse 74   Temp 98.2 °F (36.8 °C) (Oral)   Resp 16   Ht 5' 4\" (1.626 m)   Wt (!) 372 lb 2.2 oz (168.8 kg)   SpO2 93%   BMI 63.88 kg/m²     General appearance: No apparent distress, appears stated age and cooperative. HEENT: Pupils equal, round, and reactive to light. Conjunctivae/corneas clear. Neck: Supple, with full range of motion. No jugular venous distention. Trachea midline. Respiratory:  Normal respiratory effort.  Clear to auscultation, PELVIS LEFT   Final Result   No acute findings involving the left hip were ankle. Assessment/Plan:    Active Hospital Problems    Diagnosis    Morbid obesity due to excess calories ([facility] [address] [E66.01]     Priority: Medium     Class: Present on Admission    Diabetes mellitus ([facility] [address] [E11.9]     Priority: Medium     Class: Present on Admission    Essential hypertension [I10]     Priority: Medium     Class: Present on Admission    Frequent falls [R29.6]    HLD (hyperlipidemia) [E78.5]    Chronic kidney disease, stage 3, mod decreased GFR (HCC) [N18.3]     PLAN:    Muscular deconditioning, ataxia, frequent falls  Initially wanted to go home. Later changed her mind and agreed to go to skilled nursing facility. Currently [facility] approval is pending for [facility] to [facility].     Chronic kidney disease stage III  Creatinine stable at baseline. Continue to monitor     Diabetes mellitus type 2 with  Continue Lantus and sliding scale insulin.     Morbid Obesity   Body mass index is 26.85 kg/m².  Complicating assessment and treatment. Placing patient at risk for multiple co-morbidities as well as early death and contributing to the patient's presentation. Counseled on weight loss     Dyslipidemia  Continue home Lipitor    Asthma without exacerbation  Continue maintenance treatment         DVT Prophylaxis: Lovenox  Diet: DIET RENAL;  Code Status: Limited    PT/OT Eval Status:  In process    Dispo -skilled nursing facility when insurance approval received    Rachel Adame MD

## 2020-08-11 VITALS
RESPIRATION RATE: 16 BRPM | HEART RATE: 90 BPM | WEIGHT: 293 LBS | HEIGHT: 64 IN | DIASTOLIC BLOOD PRESSURE: 70 MMHG | BODY MASS INDEX: 50.02 KG/M2 | TEMPERATURE: 98.3 F | OXYGEN SATURATION: 94 % | SYSTOLIC BLOOD PRESSURE: 133 MMHG

## 2020-08-11 LAB
GLUCOSE BLD-MCNC: 142 MG/DL (ref 70–99)
GLUCOSE BLD-MCNC: 177 MG/DL (ref 70–99)
PERFORMED ON: ABNORMAL
PERFORMED ON: ABNORMAL
SARS-COV-2, NAAT: NOT DETECTED

## 2020-08-11 PROCEDURE — 6370000000 HC RX 637 (ALT 250 FOR IP): Performed by: INTERNAL MEDICINE

## 2020-08-11 PROCEDURE — 96372 THER/PROPH/DIAG INJ SC/IM: CPT

## 2020-08-11 PROCEDURE — 6360000002 HC RX W HCPCS: Performed by: NURSE PRACTITIONER

## 2020-08-11 PROCEDURE — G0378 HOSPITAL OBSERVATION PER HR: HCPCS

## 2020-08-11 PROCEDURE — U0002 COVID-19 LAB TEST NON-CDC: HCPCS

## 2020-08-11 PROCEDURE — 6370000000 HC RX 637 (ALT 250 FOR IP): Performed by: NURSE PRACTITIONER

## 2020-08-11 RX ADMIN — OXYCODONE HYDROCHLORIDE AND ACETAMINOPHEN 1 TABLET: 5; 325 TABLET ORAL at 03:06

## 2020-08-11 RX ADMIN — OXYCODONE HYDROCHLORIDE AND ACETAMINOPHEN 1 TABLET: 5; 325 TABLET ORAL at 09:14

## 2020-08-11 RX ADMIN — HEPARIN SODIUM 5000 UNITS: 5000 INJECTION INTRAVENOUS; SUBCUTANEOUS at 14:47

## 2020-08-11 RX ADMIN — INSULIN GLARGINE 30 UNITS: 100 INJECTION, SOLUTION SUBCUTANEOUS at 09:15

## 2020-08-11 RX ADMIN — OXYCODONE HYDROCHLORIDE AND ACETAMINOPHEN 1 TABLET: 5; 325 TABLET ORAL at 16:16

## 2020-08-11 RX ADMIN — INSULIN LISPRO 2 UNITS: 100 INJECTION, SOLUTION INTRAVENOUS; SUBCUTANEOUS at 13:17

## 2020-08-11 RX ADMIN — INSULIN LISPRO 2 UNITS: 100 INJECTION, SOLUTION INTRAVENOUS; SUBCUTANEOUS at 09:15

## 2020-08-11 RX ADMIN — MICONAZOLE NITRATE: 20 POWDER TOPICAL at 09:15

## 2020-08-11 RX ADMIN — TIMOLOL MALEATE 1 DROP: 2.5 SOLUTION/ DROPS OPHTHALMIC at 09:14

## 2020-08-11 RX ADMIN — GABAPENTIN 300 MG: 300 CAPSULE ORAL at 14:47

## 2020-08-11 RX ADMIN — GABAPENTIN 300 MG: 300 CAPSULE ORAL at 09:14

## 2020-08-11 RX ADMIN — METHOCARBAMOL TABLETS 500 MG: 500 TABLET, COATED ORAL at 09:14

## 2020-08-11 RX ADMIN — ATORVASTATIN CALCIUM 40 MG: 40 TABLET, FILM COATED ORAL at 09:14

## 2020-08-11 RX ADMIN — HEPARIN SODIUM 5000 UNITS: 5000 INJECTION INTRAVENOUS; SUBCUTANEOUS at 06:23

## 2020-08-11 ASSESSMENT — PAIN SCALES - GENERAL
PAINLEVEL_OUTOF10: 9
PAINLEVEL_OUTOF10: 10
PAINLEVEL_OUTOF10: 7

## 2020-08-11 NOTE — PLAN OF CARE
Pt encouraged to call out when in need. Call light and bedside table within reach . Bed rails up 3/4 wheels locked with bed in lowest position. Non skids on with bed alarm engaged. Pt verbalizes understanding , will continue To monitor.

## 2020-08-11 NOTE — PLAN OF CARE
Problem: Falls - Risk of:  Goal: Will remain free from falls  Description: Will remain free from falls  8/11/2020 1350 by Susanne Garcia RN  Outcome: Ongoing     Problem: Skin Integrity:  Goal: Will show no infection signs and symptoms  Description: Will show no infection signs and symptoms  8/11/2020 1350 by Susanne Garcia RN  Outcome: Ongoing     Problem: Pain:  Goal: Pain level will decrease  Description: Pain level will decrease  8/11/2020 1350 by Susanne Garcia RN  Outcome: Ongoing

## 2020-08-11 NOTE — PLAN OF CARE
Problem: Falls - Risk of:  Goal: Will remain free from falls  Description: Will remain free from falls  8/11/2020 1022 by Rukhsana Kumar RN  Outcome: Ongoing     Problem: Skin Integrity:  Goal: Will show no infection signs and symptoms  Description: Will show no infection signs and symptoms  Outcome: Ongoing     Problem: Pain:  Goal: Pain level will decrease  Description: Pain level will decrease  Outcome: Ongoing

## 2020-08-11 NOTE — CARE COORDINATION
CASE MANAGEMENT DISCHARGE SUMMARY      Discharge to: [facility] skilled    Precertification completed: Yes by facility  Hospital Exemption Notification (HENS) completed: Umangchitra Barrera completed due to obs status    New Durable Medical Equipment ordered/agency: N/A    Transportation: Medical   Family/car: N   Medical Transport explained to [employer]. Pt/family voice no agency preference. Agency used: [address] ambulance    [address] up time: 4:00pm   Ambulance form completed: Yes    Confirmed discharge plan with:     Patient: yes     Family, name and contact number: N/A   Facility/Agency, name:  RC/AVS faxed to [facility]     Phone number for report to facility: [phone]     RN, name: Perry Abad    Note: Discharging nurse to complete RC, reconcile AVS, and place final copy with patient's discharge packet. RN to ensure that written prescriptions for  Level II medications are sent with patient to the facility as per protocol.

## 2020-08-14 ENCOUNTER — HOSPITAL ENCOUNTER (EMERGENCY)
Age: 60
Discharge: HOME | End: 2020-08-14
Attending: EMERGENCY MEDICINE
Payer: COMMERCIAL

## 2020-08-14 ENCOUNTER — APPOINTMENT (OUTPATIENT)
Dept: GENERAL RADIOLOGY | Age: 60
End: 2020-08-14
Payer: COMMERCIAL

## 2020-08-14 ENCOUNTER — APPOINTMENT (OUTPATIENT)
Dept: CT IMAGING | Age: 60
End: 2020-08-14
Payer: COMMERCIAL

## 2020-08-14 VITALS
DIASTOLIC BLOOD PRESSURE: 42 MMHG | WEIGHT: 293 LBS | HEART RATE: 93 BPM | SYSTOLIC BLOOD PRESSURE: 165 MMHG | TEMPERATURE: 98.4 F | BODY MASS INDEX: 50.02 KG/M2 | RESPIRATION RATE: 15 BRPM | HEIGHT: 64 IN | OXYGEN SATURATION: 94 %

## 2020-08-14 LAB
A/G RATIO: 1.1 (ref 1.1–2.2)
ALBUMIN SERPL-MCNC: 3.5 G/DL (ref 3.4–5)
ALP BLD-CCNC: 81 U/L (ref 40–129)
ALT SERPL-CCNC: 8 U/L (ref 10–40)
ANION GAP SERPL CALCULATED.3IONS-SCNC: 14 MMOL/L (ref 3–16)
AST SERPL-CCNC: 9 U/L (ref 15–37)
BASOPHILS ABSOLUTE: 0 K/UL (ref 0–0.2)
BASOPHILS RELATIVE PERCENT: 0.4 %
BILIRUB SERPL-MCNC: 0.3 MG/DL (ref 0–1)
BUN BLDV-MCNC: 46 MG/DL (ref 7–20)
CALCIUM SERPL-MCNC: 9.3 MG/DL (ref 8.3–10.6)
CHLORIDE BLD-SCNC: 102 MMOL/L (ref 99–110)
CO2: 20 MMOL/L (ref 21–32)
CREAT SERPL-MCNC: 1.6 MG/DL (ref 0.6–1.2)
EKG ATRIAL RATE: 93 BPM
EKG DIAGNOSIS: NORMAL
EKG P AXIS: 52 DEGREES
EKG P-R INTERVAL: 156 MS
EKG Q-T INTERVAL: 352 MS
EKG QRS DURATION: 90 MS
EKG QTC CALCULATION (BAZETT): 437 MS
EKG R AXIS: -8 DEGREES
EKG T AXIS: 97 DEGREES
EKG VENTRICULAR RATE: 93 BPM
EOSINOPHILS ABSOLUTE: 0.1 K/UL (ref 0–0.6)
EOSINOPHILS RELATIVE PERCENT: 1.9 %
GFR AFRICAN AMERICAN: 40
GFR NON-AFRICAN AMERICAN: 33
GLOBULIN: 3.2 G/DL
GLUCOSE BLD-MCNC: 249 MG/DL (ref 70–99)
GLUCOSE BLD-MCNC: 261 MG/DL (ref 70–99)
HCT VFR BLD CALC: 30.9 % (ref 36–48)
HEMOGLOBIN: 10.3 G/DL (ref 12–16)
LACTIC ACID: 0.7 MMOL/L (ref 0.4–2)
LIPASE: 10 U/L (ref 13–60)
LYMPHOCYTES ABSOLUTE: 0.3 K/UL (ref 1–5.1)
LYMPHOCYTES RELATIVE PERCENT: 10.2 %
MCH RBC QN AUTO: 33.1 PG (ref 26–34)
MCHC RBC AUTO-ENTMCNC: 33.3 G/DL (ref 31–36)
MCV RBC AUTO: 99.3 FL (ref 80–100)
MONOCYTES ABSOLUTE: 0.6 K/UL (ref 0–1.3)
MONOCYTES RELATIVE PERCENT: 17 %
NEUTROPHILS ABSOLUTE: 2.3 K/UL (ref 1.7–7.7)
NEUTROPHILS RELATIVE PERCENT: 70.5 %
PDW BLD-RTO: 13 % (ref 12.4–15.4)
PERFORMED ON: ABNORMAL
PLATELET # BLD: 245 K/UL (ref 135–450)
PMV BLD AUTO: 9.6 FL (ref 5–10.5)
POTASSIUM REFLEX MAGNESIUM: 3.6 MMOL/L (ref 3.5–5.1)
PRO-BNP: 384 PG/ML (ref 0–124)
RBC # BLD: 3.11 M/UL (ref 4–5.2)
SODIUM BLD-SCNC: 136 MMOL/L (ref 136–145)
TOTAL PROTEIN: 6.7 G/DL (ref 6.4–8.2)
TROPONIN: <0.01 NG/ML
WBC # BLD: 3.2 K/UL (ref 4–11)

## 2020-08-14 PROCEDURE — 80053 COMPREHEN METABOLIC PANEL: CPT

## 2020-08-14 PROCEDURE — 99284 EMERGENCY DEPT VISIT MOD MDM: CPT

## 2020-08-14 PROCEDURE — 85025 COMPLETE CBC W/AUTO DIFF WBC: CPT

## 2020-08-14 PROCEDURE — 83880 ASSAY OF NATRIURETIC PEPTIDE: CPT

## 2020-08-14 PROCEDURE — 93010 ELECTROCARDIOGRAM REPORT: CPT | Performed by: INTERNAL MEDICINE

## 2020-08-14 PROCEDURE — 84484 ASSAY OF TROPONIN QUANT: CPT

## 2020-08-14 PROCEDURE — 6360000002 HC RX W HCPCS: Performed by: EMERGENCY MEDICINE

## 2020-08-14 PROCEDURE — 96375 TX/PRO/DX INJ NEW DRUG ADDON: CPT

## 2020-08-14 PROCEDURE — 83690 ASSAY OF LIPASE: CPT

## 2020-08-14 PROCEDURE — 71045 X-RAY EXAM CHEST 1 VIEW: CPT

## 2020-08-14 PROCEDURE — 83605 ASSAY OF LACTIC ACID: CPT

## 2020-08-14 PROCEDURE — 74176 CT ABD & PELVIS W/O CONTRAST: CPT

## 2020-08-14 PROCEDURE — 93005 ELECTROCARDIOGRAM TRACING: CPT | Performed by: EMERGENCY MEDICINE

## 2020-08-14 PROCEDURE — 96374 THER/PROPH/DIAG INJ IV PUSH: CPT

## 2020-08-14 RX ORDER — ONDANSETRON 2 MG/ML
4 INJECTION INTRAMUSCULAR; INTRAVENOUS ONCE
Status: COMPLETED | OUTPATIENT
Start: 2020-08-14 | End: 2020-08-14

## 2020-08-14 RX ADMIN — HYDROMORPHONE HYDROCHLORIDE 0.5 MG: 1 INJECTION, SOLUTION INTRAMUSCULAR; INTRAVENOUS; SUBCUTANEOUS at 09:10

## 2020-08-14 RX ADMIN — ONDANSETRON 4 MG: 2 INJECTION INTRAMUSCULAR; INTRAVENOUS at 08:42

## 2020-08-14 ASSESSMENT — PAIN DESCRIPTION - LOCATION: LOCATION: ABDOMEN

## 2020-08-14 ASSESSMENT — PAIN DESCRIPTION - PAIN TYPE: TYPE: ACUTE PAIN

## 2020-08-14 ASSESSMENT — PAIN DESCRIPTION - ONSET: ONSET: ON-GOING

## 2020-08-14 ASSESSMENT — PAIN SCALES - GENERAL
PAINLEVEL_OUTOF10: 0
PAINLEVEL_OUTOF10: 10

## 2020-08-14 ASSESSMENT — PAIN DESCRIPTION - FREQUENCY: FREQUENCY: CONTINUOUS

## 2020-08-14 NOTE — ED PROVIDER NOTES
resolved    Anemia     Arthritis     causing severe lower back pains    Asthma     Bronchitis     Bursitis     all joints    C. difficile colitis     Diabetes mellitus ([facility] [address]     age 4   Bianca Exon Herpes     4    History of blood transfusion     Hypercholesteremia     Hypertension     Migraine     Neuropathy     hips to legs    Obesity     Psychiatric problem     anxiety and depression    Recurrent sinus infections     Renal failure     in past with 8 dialysis treatments    Retinopathy of both eyes     Sleep apnea     can't use a cpap    Ulcer, stomach peptic     x 3    Unspecified cerebral artery occlusion with cerebral infarction     \"silent stroke\"    Unspecified diseases of blood and blood-forming organs     bled for 1 year straight at age 4         SURGICAL HISTORY       Past Surgical History:   Procedure Laterality Date    ABDOMEN SURGERY      ABDOMINAL EXPLORATION SURGERY  13    EXPLORATORY LAPAROTOMY LYSIS OF ADHESIONS    APPENDECTOMY      CARPAL TUNNEL RELEASE      both hands    CATARACT REMOVAL WITH IMPLANT  11    right    CATARACT REMOVAL WITH IMPLANT  2011    left eye     SECTION      COLONOSCOPY      DILATATION, ESOPHAGUS      ENDOSCOPY, COLON, DIAGNOSTIC      EYE SURGERY      laser, vitrectomy ou, cataract ou    FRACTURE SURGERY      HYSTERECTOMY      partial    JOINT REPLACEMENT      both knees    OTHER SURGICAL HISTORY  8/15/11    Left Shoulder Decompression    SHOULDER SURGERY      right     SUBTOTAL COLECTOMY  2000    TONSILLECTOMY      TUNNELED VENOUS PORT PLACEMENT  11    TUNNELED VENOUS PORT PLACEMENT  3/1/13    Devonte Bustillos         CURRENT MEDICATIONS       Discharge Medication List as of 2020  9:36 AM      CONTINUE these medications which have NOT CHANGED    Details   !! insulin glargine (BASAGLAR KWIKPEN) 100 UNIT/ML injection pen Inject 28 Units into the skin 2 times daily Before breakfast and dinner, Disp-5 pen,R-3NO PRINT      !! insulin glargine (BASAGLAR KWIKPEN) 100 UNIT/ML injection pen Inject into the skin nightlyHistorical Med      methocarbamol (ROBAXIN) 500 MG tablet TAKE ONE TABLET BY MOUTH TWICE A DAY, Disp-60 tablet, R-0Normal      diazepam (VALIUM) 10 MG tablet take 1 tablet by oral route  every bedtimeHistorical Med      atorvastatin (LIPITOR) 40 MG tablet Take 40 mg by mouthHistorical Med      Misc. Devices [place]) MISC DAILY Starting Mon 10/9/2017, Disp-1 each, R-0, Print      insulin aspart (NOVOLOG FLEXPEN) 100 UNIT/ML injection pen Continue with your current aspart insulin regimen (fairly complex aspart system which is a combination of SSI, carb counting, scheduled post-meal insulin, and adjustments based on her level of nausea at the time - about 40u/day). , Disp-5 Pen, R-3NO PRIN T      timolol (BETIMOL) 0.25 % ophthalmic solution 1-2 drops 2 times daily      Cholecalciferol (VITAMIN D3) 2000 UNITS CAPS Take  by mouth daily. Cyanocobalamin (VITAMIN B 12 PO) Take 1 tablet by mouth daily. L-Lysine 500 MG CAPS Take  by mouth 4 times daily as needed. sertraline (ZOLOFT) 100 MG tablet Take 200 mg by mouth nightly. lactulose (CHRONULAC) 10 GM/15ML solution Take 20 g by mouth daily. ketorolac (ACULAR LS) 0.4 % SOLN ophthalmic solution 1 drop daily. olopatadine (PATANOL) 0.1 % ophthalmic solution 1 drop 2 times daily. medroxyPROGESTERone (PROVERA) 2.5 MG tablet Take 10 mg by mouth daily. montelukast (SINGULAIR) 10 MG tablet Take 10 mg by mouth nightly. !! - Potential duplicate medications found. Please discuss with provider. ALLERGIES     Adhesive tape; Ambien [zolpidem]; Codeine; Flurosyn [fluocinolone acetonide];  Morphine; and Tegaderm ag mesh [silver]    FAMILY HISTORY       Family History   Problem Relation Age of Onset    Diabetes Father     Heart Disease Father         mi    High Blood Pressure Father     High Cholesterol Father     Diabetes Paternal Aunt     Heart Disease Paternal Aunt     High Blood Pressure Paternal Aunt     High Cholesterol Paternal Aunt     Cancer Paternal Aunt         ovarian    Mult Sclerosis Paternal Aunt     Dementia Mother     High Blood Pressure Mother     Heart Disease Maternal Grandmother     Heart Disease Paternal Grandmother     Diabetes Paternal Uncle     Heart Disease Paternal Uncle     High Blood Pressure Paternal Uncle     High Cholesterol Paternal Uncle     Cancer Paternal Uncle     Cancer Maternal Grandfather         lung          SOCIAL HISTORY       Social History     Socioeconomic History    Marital status:      Spouse name: None    Number of children: None    Years of education: None    Highest education level: None   Occupational History    None   Social Needs    Financial resource strain: None    Food insecurity     Worry: None     Inability: None    Transportation needs     Medical: None     Non-medical: None   Tobacco Use    Smoking status: Never Smoker    Smokeless tobacco: Never Used   Substance and Sexual Activity    Alcohol use: No    Drug use: No    Sexual activity: Never   Lifestyle    Physical activity     Days per week: None     Minutes per session: None    Stress: None   Relationships    Social connections     Talks on phone: None     Gets together: None     Attends [employer] service: None     Active member of club or organization: None     Attends meetings of clubs or organizations: None     Relationship status: None    Intimate partner violence     Fear of current or ex partner: None     Emotionally abused: None     Physically abused: None     Forced sexual activity: None   Other Topics Concern    None   Social History Narrative    None       SCREENINGS    [place] Coma Scale  Eye Opening: Spontaneous  Best Verbal Response: Oriented  Best Motor Response: Obeys commands  Naomi Coma Scale Score: 15        PHYSICAL EXAM    (up to 7 for level 4, 8 kartik for level 5)     ED Triage Vitals [08/14/20 0720]   BP Temp Temp Source Pulse Resp SpO2 Height Weight   (!) 154/70 98.4 °F (36.9 °C) Oral 98 16 97 % 5' 4\" (1.626 m) (!) 372 lb (168.7 kg)       Physical Exam  Constitutional:       General: She is not in acute distress. Appearance: Normal appearance. She is well-developed. She is not ill-appearing or toxic-appearing. Comments: Sitting in bed comfortably, speaking in full sentences, following verbal commands appropriately. Not in acute distress     HENT:      Head: Normocephalic and atraumatic. Eyes:      Conjunctiva/sclera: Conjunctivae normal.      Pupils: Pupils are equal, round, and reactive to light. Neck:      Musculoskeletal: Normal range of motion and neck supple. Cardiovascular:      Rate and Rhythm: Normal rate and regular rhythm. Heart sounds: Normal heart sounds. No murmur. No friction rub. No gallop. Pulmonary:      Effort: Pulmonary effort is normal. No respiratory distress. Breath sounds: Normal breath sounds. No decreased breath sounds, wheezing, rhonchi or rales. Abdominal:      General: Bowel sounds are normal. There is no distension. Palpations: Abdomen is soft. Tenderness: There is generalized abdominal tenderness. There is no guarding or rebound. Musculoskeletal: Normal range of motion. General: No tenderness or deformity. Skin:     General: Skin is warm and dry. Findings: No rash. Neurological:      Mental Status: She is alert and oriented to person, place, and time. GCS: GCS eye subscore is 4. GCS verbal subscore is 5. GCS motor subscore is 6. Psychiatric:         Behavior: Behavior is cooperative.          DIAGNOSTIC RESULTS     EKG: All EKG's are interpreted by the Emergency Department Physicianwho either signs or Co-signs this chart in the absence of a cardiologist.      RADIOLOGY:   Non-plain film images such as CT, Ultrasound and MRI are read by the radiologist. Yoon Orozco radiographic images are visualized and preliminarily interpreted by the emergency physician with the below findings:      Interpretation per the Radiologist below, if available at the time of this note:    CT ABDOMEN PELVIS WO CONTRAST Additional Contrast? None   Final Result   1. Moderate ileus versus enterocolitis. 2. Nonobstructing bilateral nephrolithiasis. XR CHEST PORTABLE   Final Result   Findings suggestive of presence of minimal left basilar subsegmental   atelectasis. Otherwise, no significant change in the appearance of the chest   since examination of 06/26/2020.                ED BEDSIDE ULTRASOUND:   Performed by ED Physician - none    LABS:  Labs Reviewed   CBC WITH AUTO DIFFERENTIAL - Abnormal; Notable for the following components:       Result Value    WBC 3.2 (*)     RBC 3.11 (*)     Hemoglobin 10.3 (*)     Hematocrit 30.9 (*)     Lymphocytes Absolute 0.3 (*)     All other components within normal limits    Narrative:     Performed at:  [place] [address] [address]   Phone [phone]   COMPREHENSIVE METABOLIC PANEL W/ REFLEX TO MG FOR LOW K - Abnormal; Notable for the following components:    CO2 20 (*)     Glucose 261 (*)     BUN 46 (*)     CREATININE 1.6 (*)     GFR Non- 33 (*)     GFR  40 (*)     ALT 8 (*)     AST 9 (*)     All other components within normal limits    Narrative:     Performed at:  [place] [address] [address]   Phone [phone]   LIPASE - Abnormal; Notable for the following components:    Lipase 10.0 (*)     All other components within normal limits    Narrative:     Performed at:  [place] [address] [address]   Phone [phone]   BRAIN NATRIURETIC PEPTIDE - Abnormal; Notable for the following components:    Pro- (*)     All other components within normal limits    Narrative:     Performed at:  [address], [facility] [address]   Phone [phone]   POCT GLUCOSE - Abnormal; Notable for the following components:    POC Glucose 249 (*)     All other components within normal limits    Narrative:     Performed at:  [facility]  [address],  [place], [address] [address]   Phone [phone]   TROPONIN    Narrative:     Performed at:  [address], [facility] [address]   Phone [phone]   LACTIC ACID, PLASMA    Narrative:     Performed at:  [address], [facility] [address]   Phone [phone]   URINE RT REFLEX TO CULTURE       All other labs were within normal range ornot returned as of this dictation. EMERGENCY DEPARTMENT COURSE and DIFFERENTIAL DIAGNOSIS/MDM:   Vitals:    Vitals:    08/14/20 0844 08/14/20 0944 08/14/20 1154 08/14/20 1214   BP: (!) 160/56 (!) 153/83 (!) 171/55 (!) 165/42   Pulse: 93 94 99 93   Resp: 12 19 15 15   Temp:       TempSrc:       SpO2: 98% 94% 94%    Weight:       Height:             MDM    ED COURSE/MDM    -Josey Seay is a 60 y.o. female presents ED for abdominal pain, nausea vomiting diarrhea.  -Of note patient was recently discharged on 8/9/2020 nursing home after being admitted status post fall.  -Patient reports she woke up with sweating, abdominal pain, nausea and vomiting. On evaluation, patient well-appearing, not in acute distress, requesting Dilaudid and Zofran. Informed her that I will give her Zofran however will start off with pain medication but is not quite as strong and escalate as needed.  -Lab work was difficult for creatinine of 1.6 which is at patient's baseline. proBNP of 384, negative leukocytosis, H&H stable at 10.3/30.9.  -CT abdomen pelvis shows moderate ileus versus enterocolitis.   Nonobstructing bilateral nephrolithiasis. -C x-ray shows finding suggestive of presence of minimal left basilar subsegmental atelectasis. Otherwise no significant change in the appearance of the chest since 6/26/2020.  -On reassessment patient reports persistent abdominal pain was given Dilaudid 0.5 mg. Tolerated p.o. challenge.  -Consulted GI, spoke with Dr. Kala العراقي who states that there is nothing to be done for ileus, just supportive care with fluids and nausea medication. Also recommended stool studies C. difficile given that patient has complaints of diarrhea. Patient was unable to give a sample here will be discharged with stool studies orders.  -Labs and imaging reviewed and results discussed with patient. Noacute pathology was noted and plan for discharge back to NH with zofran and close follow up with PCP was discussed with patient. Strict ED return precautions given for new/worsending symptoms. Patient in agreement withplan, verbally confirm understanding and have no further questions/concerns. REASSESSMENT      Well appearing, non toxic, alert, oriented speaking in full sentences and hemodynamically stable upon discharge      CRITICAL CARE TIME   Total Critical Care time was 0 minutes, excluding separately reportableprocedures. There was a high probability of clinicallysignificant/life threatening deterioration in the patient's condition which required my urgent intervention. CONSULTS:  IP CONSULT TO GI    PROCEDURES:  Unless otherwise noted below, none     Procedures    FINAL IMPRESSION      1. Ileus ([address]    2. Non-intractable vomiting with nausea, unspecified vomiting type    3.  Diarrhea, unspecified type          DISPOSITION/PLAN   DISPOSITION        PATIENT REFERREDTO:  Denise Fu [address] [facility]  [phone]    Call in 1 day        DISCHARGE MEDICATIONS:  Discharge Medication List as of 8/14/2020  9:36 AM             (Please note that portions of this note were completed with a voice recognition program.  Efforts were made to edit the dictations but occasionally wordsare mis-transcribed.)    Danii Kumra MD (electronically signed)  Attending Emergency Physician            Danii Kumar MD  08/14/20 0322       Danii Kuamr MD  09/04/20 6224

## 2020-08-14 NOTE — ED NOTES
[facility] GI @ 3986  Re: moderate ileus with emesis  Dr Daniel Mendoza responded @ [address]  08/14/20 3589

## 2020-08-14 NOTE — ED NOTES
Initiated P.O. challenge per MD orders. Raised head of bed to tolerable position, and pt given cup of ice water to drink. Instructed pt to notify staff if she develops nausea or emesis, and she agreed. Pt has no further request at this time.      Joel Westbrook  08/14/20 5759

## 2020-08-14 NOTE — ED NOTES
Bed: 16  Expected date: 8/14/20  Expected time:   Means of arrival:   Comments:  heike Pitts RN  08/14/20 9262

## 2020-08-14 NOTE — CARE COORDINATION
Writer asked to meet with patient as she is refusing to return to [facility] and [facility] is here to take her back. Spoke to patient at bedside and she is stating that she felt they were not listening to her there. States she feels like she is \"relinquishing\" all of her things there. States she cant even decide when to go to bathroom. Pt acknowledges that she can not care for herself and that she needs help. Sates that she is ok with returning as long as writer communicates the above communication issue to facility and that they try to get her a more comfortable bed. Writer spoke to [employer] who is now aware and will work with facility.  Gio Andrade RN

## 2020-08-14 NOTE — ED NOTES

## 2020-08-14 NOTE — ED TRIAGE NOTES
A&Ox4 pt arrives to ED, via ground transport, from [facility], with c/o sudden onset nausea, vomiting, diaphoresis, and abdominal pain. Reports 2 episodes of emesis this morning. R even and slightly labored at thsi time.

## 2020-08-17 ENCOUNTER — TELEPHONE (OUTPATIENT)
Dept: INFECTIOUS DISEASES | Age: 60
End: 2020-08-17

## 2020-08-17 NOTE — TELEPHONE ENCOUNTER
Received call from ham to verify if we received labs from today, also to discuss infusion plans.      She can be reached at [phone]

## 2020-08-27 ENCOUNTER — APPOINTMENT (OUTPATIENT)
Dept: CT IMAGING | Age: 60
End: 2020-08-27
Payer: COMMERCIAL

## 2020-08-27 ENCOUNTER — APPOINTMENT (OUTPATIENT)
Dept: GENERAL RADIOLOGY | Age: 60
End: 2020-08-27
Payer: COMMERCIAL

## 2020-08-27 ENCOUNTER — HOSPITAL ENCOUNTER (EMERGENCY)
Age: 60
Discharge: HOME | End: 2020-08-27
Payer: COMMERCIAL

## 2020-08-27 VITALS
HEIGHT: 64 IN | SYSTOLIC BLOOD PRESSURE: 120 MMHG | TEMPERATURE: 98 F | OXYGEN SATURATION: 98 % | HEART RATE: 88 BPM | RESPIRATION RATE: 16 BRPM | WEIGHT: 293 LBS | DIASTOLIC BLOOD PRESSURE: 89 MMHG | BODY MASS INDEX: 50.02 KG/M2

## 2020-08-27 LAB
A/G RATIO: 1.3 (ref 1.1–2.2)
ALBUMIN SERPL-MCNC: 3.6 G/DL (ref 3.4–5)
ALP BLD-CCNC: 87 U/L (ref 40–129)
ALT SERPL-CCNC: 8 U/L (ref 10–40)
ANION GAP SERPL CALCULATED.3IONS-SCNC: 12 MMOL/L (ref 3–16)
AST SERPL-CCNC: 11 U/L (ref 15–37)
BASOPHILS ABSOLUTE: 0.1 K/UL (ref 0–0.2)
BASOPHILS RELATIVE PERCENT: 0.5 %
BILIRUB SERPL-MCNC: <0.2 MG/DL (ref 0–1)
BUN BLDV-MCNC: 37 MG/DL (ref 7–20)
CALCIUM SERPL-MCNC: 10.1 MG/DL (ref 8.3–10.6)
CHLORIDE BLD-SCNC: 105 MMOL/L (ref 99–110)
CO2: 18 MMOL/L (ref 21–32)
CREAT SERPL-MCNC: 1.7 MG/DL (ref 0.6–1.2)
EOSINOPHILS ABSOLUTE: 0.1 K/UL (ref 0–0.6)
EOSINOPHILS RELATIVE PERCENT: 1.3 %
GFR AFRICAN AMERICAN: 37
GFR NON-AFRICAN AMERICAN: 31
GLOBULIN: 2.7 G/DL
GLUCOSE BLD-MCNC: 302 MG/DL (ref 70–99)
HCT VFR BLD CALC: 32.9 % (ref 36–48)
HEMOGLOBIN: 10.8 G/DL (ref 12–16)
LACTIC ACID: 0.9 MMOL/L (ref 0.4–2)
LIPASE: 23 U/L (ref 13–60)
LYMPHOCYTES ABSOLUTE: 1.1 K/UL (ref 1–5.1)
LYMPHOCYTES RELATIVE PERCENT: 10.8 %
MCH RBC QN AUTO: 32.9 PG (ref 26–34)
MCHC RBC AUTO-ENTMCNC: 32.7 G/DL (ref 31–36)
MCV RBC AUTO: 100.6 FL (ref 80–100)
MONOCYTES ABSOLUTE: 0.5 K/UL (ref 0–1.3)
MONOCYTES RELATIVE PERCENT: 5.2 %
NEUTROPHILS ABSOLUTE: 8.2 K/UL (ref 1.7–7.7)
NEUTROPHILS RELATIVE PERCENT: 82.2 %
PDW BLD-RTO: 13.8 % (ref 12.4–15.4)
PLATELET # BLD: 300 K/UL (ref 135–450)
PMV BLD AUTO: 9.7 FL (ref 5–10.5)
POTASSIUM SERPL-SCNC: 3.8 MMOL/L (ref 3.5–5.1)
RBC # BLD: 3.27 M/UL (ref 4–5.2)
SODIUM BLD-SCNC: 135 MMOL/L (ref 136–145)
SPECIMEN STATUS: NORMAL
TOTAL PROTEIN: 6.3 G/DL (ref 6.4–8.2)
WBC # BLD: 10 K/UL (ref 4–11)

## 2020-08-27 PROCEDURE — 96374 THER/PROPH/DIAG INJ IV PUSH: CPT

## 2020-08-27 PROCEDURE — 74176 CT ABD & PELVIS W/O CONTRAST: CPT

## 2020-08-27 PROCEDURE — 80053 COMPREHEN METABOLIC PANEL: CPT

## 2020-08-27 PROCEDURE — 83605 ASSAY OF LACTIC ACID: CPT

## 2020-08-27 PROCEDURE — 99284 EMERGENCY DEPT VISIT MOD MDM: CPT

## 2020-08-27 PROCEDURE — 96376 TX/PRO/DX INJ SAME DRUG ADON: CPT

## 2020-08-27 PROCEDURE — 71045 X-RAY EXAM CHEST 1 VIEW: CPT

## 2020-08-27 PROCEDURE — 6360000002 HC RX W HCPCS: Performed by: NURSE PRACTITIONER

## 2020-08-27 PROCEDURE — 2580000003 HC RX 258: Performed by: NURSE PRACTITIONER

## 2020-08-27 PROCEDURE — 85025 COMPLETE CBC W/AUTO DIFF WBC: CPT

## 2020-08-27 PROCEDURE — 83690 ASSAY OF LIPASE: CPT

## 2020-08-27 RX ORDER — ONDANSETRON 2 MG/ML
4 INJECTION INTRAMUSCULAR; INTRAVENOUS ONCE
Status: COMPLETED | OUTPATIENT
Start: 2020-08-27 | End: 2020-08-27

## 2020-08-27 RX ORDER — GREEN TEA/HOODIA GORDONII 315-12.5MG
1 CAPSULE ORAL 2 TIMES DAILY
Qty: 60 TABLET | Refills: 0 | Status: SHIPPED | OUTPATIENT
Start: 2020-08-27 | End: 2020-09-26

## 2020-08-27 RX ORDER — ONDANSETRON 2 MG/ML
4 INJECTION INTRAMUSCULAR; INTRAVENOUS
Status: COMPLETED | OUTPATIENT
Start: 2020-08-27 | End: 2020-08-27

## 2020-08-27 RX ORDER — ONDANSETRON 4 MG/1
4-8 TABLET, ORALLY DISINTEGRATING ORAL EVERY 12 HOURS PRN
Qty: 12 TABLET | Refills: 0 | Status: SHIPPED | OUTPATIENT
Start: 2020-08-27

## 2020-08-27 RX ORDER — SODIUM CHLORIDE 9 MG/ML
1000 INJECTION, SOLUTION INTRAVENOUS CONTINUOUS
Status: DISCONTINUED | OUTPATIENT
Start: 2020-08-27 | End: 2020-08-27 | Stop reason: HOSPADM

## 2020-08-27 RX ADMIN — HYDROMORPHONE HYDROCHLORIDE 0.25 MG: 1 INJECTION, SOLUTION INTRAMUSCULAR; INTRAVENOUS; SUBCUTANEOUS at 20:43

## 2020-08-27 RX ADMIN — ONDANSETRON 4 MG: 2 INJECTION INTRAMUSCULAR; INTRAVENOUS at 16:20

## 2020-08-27 RX ADMIN — HYDROMORPHONE HYDROCHLORIDE 0.5 MG: 1 INJECTION, SOLUTION INTRAMUSCULAR; INTRAVENOUS; SUBCUTANEOUS at 14:34

## 2020-08-27 RX ADMIN — ONDANSETRON 4 MG: 2 INJECTION INTRAMUSCULAR; INTRAVENOUS at 20:43

## 2020-08-27 RX ADMIN — SODIUM CHLORIDE 1000 ML: 9 INJECTION, SOLUTION INTRAVENOUS at 14:43

## 2020-08-27 RX ADMIN — ONDANSETRON 4 MG: 2 INJECTION INTRAMUSCULAR; INTRAVENOUS at 14:34

## 2020-08-27 RX ADMIN — HYDROMORPHONE HYDROCHLORIDE 0.5 MG: 1 INJECTION, SOLUTION INTRAMUSCULAR; INTRAVENOUS; SUBCUTANEOUS at 16:19

## 2020-08-27 ASSESSMENT — PAIN SCALES - GENERAL
PAINLEVEL_OUTOF10: 3
PAINLEVEL_OUTOF10: 7
PAINLEVEL_OUTOF10: 8
PAINLEVEL_OUTOF10: 10
PAINLEVEL_OUTOF10: 4
PAINLEVEL_OUTOF10: 7

## 2020-08-27 ASSESSMENT — PAIN DESCRIPTION - PAIN TYPE
TYPE: ACUTE PAIN
TYPE: ACUTE PAIN

## 2020-08-27 ASSESSMENT — PAIN DESCRIPTION - LOCATION
LOCATION: ABDOMEN
LOCATION: ABDOMEN

## 2020-08-27 NOTE — ED PROVIDER NOTES
[address] [place] [address]  ED  [address] [address]  Dept: [phone]  Dept Fax: [phone]  Loc: [facility]        This patient was not seen or evaluated by the attending physician. I evaluated this patient, the attending physician was available for consultation. CHIEF COMPLAINT    Chief Complaint   Patient presents with    Abdominal Pain     States x1 week ago with abdominal pain and diarrhea. Nursing home reported pt distended and KUB showing possible illeus. hx of sbo. HPI    Jaquelyn Mcardle is a 60 y.o. female who presents with abdominal pain diffusely throughout the abdomen. Onset was a week ago. She states that she was seen for abdominal pain and nausea and vomiting. She states that she is stopped vomiting. However she developed diarrhea. She states that the diarrhea however spontaneously subsided yesterday, and has not had a bowel movement since then. Abdominal pain was better, and is worsened over the past couple of days now. She does have a history of small bowel obstruction, was concerned for small bowel obstruction or ileus. She still passing flatus, denies any vomiting. Has been nauseous. No chest pain or shortness of breath. The pain is 10/10 in severity. The quality of the pain is sharp. The context is that the symptoms started spontaneously. There are no alleviating factors. She states that she has been having increased urinary frequency. No dysuria. Denies any gross hematuria. No fevers or chills. States that she came to the ED for further evaluation and treatment as she thinks she has a small bowel obstruction once more.     REVIEW OF SYSTEMS    GI: see HPI, no vomiting, + diarrhea that resolved yesterday, no hematochezia  Cardiac: No chest pain, shortness of breath, palpitations or syncope  Pulmonary: No difficulty breathing or new cough  General: No fevers  : No dysuria, No hematuria  See HPI for further details. All other systems reviewed and are negative.     PAST MEDICAL & SURGICAL HISTORY    Past Medical History:   Diagnosis Date    Acid reflux     Acute renal failure ([address] 2011    resolved    Anemia     Arthritis     causing severe lower back pains    Asthma     Bronchitis     Bursitis     all joints    C. difficile colitis     Diabetes mellitus ([facility] [address]     age 4   [facility]     5    History of blood transfusion     Hypercholesteremia     Hypertension     Migraine     Neuropathy     hips to legs    Obesity     Psychiatric problem     anxiety and depression    Recurrent sinus infections     Renal failure     in past with 8 dialysis treatments    Retinopathy of both eyes     Sleep apnea     can't use a cpap    Ulcer, stomach peptic     x 3    Unspecified cerebral artery occlusion with cerebral infarction     \"silent stroke\"    Unspecified diseases of blood and blood-forming organs     bled for 1 year straight at age 4     Past Surgical History:   Procedure Laterality Date    ABDOMEN SURGERY      ABDOMINAL EXPLORATION SURGERY  13    EXPLORATORY LAPAROTOMY LYSIS OF ADHESIONS    APPENDECTOMY      CARPAL TUNNEL RELEASE      both hands    CATARACT REMOVAL WITH IMPLANT  11    right    CATARACT REMOVAL WITH IMPLANT  2011    left eye     SECTION      COLONOSCOPY      DILATATION, ESOPHAGUS      ENDOSCOPY, COLON, DIAGNOSTIC      EYE SURGERY      laser, vitrectomy ou, cataract ou    FRACTURE SURGERY      HYSTERECTOMY      partial    JOINT REPLACEMENT      both knees    OTHER SURGICAL HISTORY  8/15/11    Left Shoulder Decompression    SHOULDER SURGERY      right     SUBTOTAL COLECTOMY  2000    TONSILLECTOMY      TUNNELED VENOUS PORT PLACEMENT  11    TUNNELED VENOUS PORT PLACEMENT  3/1/13    Devonte Bustillos       CURRENT MEDICATIONS  (may include discharge medications prescribed in the ED)  Current Outpatient Rx   Medication Sig Dispense Refill    Probiotic Acidophilus (FLORANEX) TABS Take 1 tablet by mouth 2 times daily 60 tablet 0    ondansetron (ZOFRAN ODT) 4 MG disintegrating tablet Take 1-2 tablets by mouth every 12 hours as needed for Nausea May Sub regular tablet (non-ODT) if insurance does not cover ODT. 12 tablet 0    insulin glargine (BASAGLAR KWIKPEN) 100 UNIT/ML injection pen Inject 28 Units into the skin 2 times daily Before breakfast and dinner 5 pen 3    insulin glargine (BASAGLAR KWIKPEN) 100 UNIT/ML injection pen Inject into the skin nightly      methocarbamol (ROBAXIN) 500 MG tablet TAKE ONE TABLET BY MOUTH TWICE A DAY 60 tablet 0    diazepam (VALIUM) 10 MG tablet take 1 tablet by oral route  every bedtime      atorvastatin (LIPITOR) 40 MG tablet Take 40 mg by mouth      Misc. Devices (WALKER) MISC 1 each by Does not apply route daily 1 each 0    insulin aspart (NOVOLOG FLEXPEN) 100 UNIT/ML injection pen Continue with your current aspart insulin regimen (fairly complex aspart system which is a combination of SSI, carb counting, scheduled post-meal insulin, and adjustments based on her level of nausea at the time - about 40u/day). (Patient taking differently: Continue with your current aspart insulin regimen (fairly complex aspart system which is a combination of SSI, carb counting, scheduled post-meal insulin, and adjustments based on her level of nausea at the time - about 40u/day). 1 unit per 8 carbs and 1 unit for every 12 points but pt is unsure what her \"high\" number is.) 5 Pen 3    timolol (BETIMOL) 0.25 % ophthalmic solution 1-2 drops 2 times daily      Cholecalciferol (VITAMIN D3) 2000 UNITS CAPS Take  by mouth daily.  Cyanocobalamin (VITAMIN B 12 PO) Take 1 tablet by mouth daily.  L-Lysine 500 MG CAPS Take  by mouth 4 times daily as needed.  sertraline (ZOLOFT) 100 MG tablet Take 200 mg by mouth nightly.       lactulose (CHRONULAC) 10 GM/15ML solution Take 20 g by mouth daily.  ketorolac (ACULAR LS) 0.4 % SOLN ophthalmic solution 1 drop daily.  olopatadine (PATANOL) 0.1 % ophthalmic solution 1 drop 2 times daily.  medroxyPROGESTERone (PROVERA) 2.5 MG tablet Take 10 mg by mouth daily.  montelukast (SINGULAIR) 10 MG tablet Take 10 mg by mouth nightly.          ALLERGIES    Allergies   Allergen Reactions    Adhesive Tape Dermatitis    Ambien [Zolpidem]      Made me wild    Codeine      Can take a little just not a lot at a time    Flurosyn [Fluocinolone Acetonide]      \"flurosceen eye dye\" severe HA, fainting    Morphine      Went bezerk    Tegaderm Ag Mesh [Silver] Dermatitis     All types of tegaderm and adhesives       SOCIAL AND FAMILY HISTORY    Social History     Socioeconomic History    Marital status:      Spouse name: None    Number of children: None    Years of education: None    Highest education level: None   Occupational History    None   Social Needs    Financial resource strain: None    Food insecurity     Worry: None     Inability: None    Transportation needs     Medical: None     Non-medical: None   Tobacco Use    Smoking status: Never Smoker    Smokeless tobacco: Never Used   Substance and Sexual Activity    Alcohol use: No    Drug use: No    Sexual activity: Never   Lifestyle    Physical activity     Days per week: None     Minutes per session: None    Stress: None   Relationships    Social connections     Talks on phone: None     Gets together: None     Attends Je[employer] witness service: None     Active member of club or organization: None     Attends meetings of clubs or organizations: None     Relationship status: None    Intimate partner violence     Fear of current or ex partner: None     Emotionally abused: None     Physically abused: None     Forced sexual activity: None   Other Topics Concern    None   Social History Narrative    None     Family History   Problem Relation Age of Onset    Diabetes Father  Heart Disease Father         mi    High Blood Pressure Father     High Cholesterol Father     Diabetes Paternal Aunt     Heart Disease Paternal Aunt     High Blood Pressure Paternal Aunt     High Cholesterol Paternal Aunt     Cancer Paternal Aunt         ovarian    Mult Sclerosis Paternal Aunt     Dementia Mother     High Blood Pressure Mother     Heart Disease Maternal Grandmother     Heart Disease Paternal Grandmother     Diabetes Paternal Uncle     Heart Disease Paternal Uncle     High Blood Pressure Paternal Uncle     High Cholesterol Paternal Uncle     Cancer Paternal Uncle     Cancer Maternal Grandfather         lung       PHYSICAL EXAM    VITAL SIGNS: BP (!) 160/58   Pulse 73   Temp 98 °F (36.7 °C) (Oral)   Resp 12   Ht 5' 4\" (1.626 m)   Wt (!) 370 lb (167.8 kg)   SpO2 100%   BMI 63.51 kg/m²   Constitutional:  Well developed, well nourished, no acute distress  Eyes:  Sclera nonicteric, conjunctiva moist  HENT:  Atraumatic, nose normal  Neck: no JVD  Respiratory:  No retractions, no accessory muscle use, normal breath sounds   Cardiovascular:  regular rate, no murmurs  GI:  + generalized abdominal tenderness to palpation, soft, no guarding, bowel sounds present, no audible bruits or palpable pulsatile masses  Back: no CVA tenderness  Musculoskeletal:  No edema, no acute deformities  Vascular: DP pulses 2+ and equal bilaterally  Integument: No rashes, skin dry  Neurologic:  Alert & oriented, no slurred speech  Psychiatric: Cooperative, pleasant affect     RADIOLOGY/PROCEDURES    CT ABDOMEN PELVIS WO CONTRAST Additional Contrast? None   Final Result   There are dilated gas and fluid-filled loops of small bowel with air-fluid   levels. Gas is seen extending beyond the anastomosis into the colon. There   does appear to be wall thickening involving a small bowel loop in the mid   abdomen with a few collapsed loops of small bowel in the mid abdomen.    Constellation of findings may be related to focal enteritis with ileus versus   partial obstruction. Bilateral nonobstructing nephrolithiasis. XR CHEST PORTABLE   Final Result   No acute process. ED COURSE & MEDICAL DECISION MAKING    Pertinent Labs & Imaging studies reviewed and interpreted. (See chart for details)     See chart for details of medications given during the ED stay. Vitals:    08/27/20 1354 08/27/20 1449 08/27/20 1557 08/27/20 1627   BP: (!) 128/51 (!) 144/56 (!) 143/54 (!) 160/58   Pulse: 90 78 75 73   Resp: 16 10 13 12   Temp: 98 °F (36.7 °C)      TempSrc: Oral      SpO2: 100% 96% 99% 100%   Weight: (!) 370 lb (167.8 kg)      Height: 5' 4\" (1.626 m)          Differential diagnosis: Abdominal Aortic Aneurysm, Acute Coronary Syndrome, Ischemic Bowel, Bowel Obstruction, PUD, GERD, Acute Cholecystitis, Pancreatitis, Hepatitis, Colitis, Appendicitis, Diverticulitis, Pyelonephritis, UTI, other    CRITICAL CARE NOTE:  There was a high probability of clinically significant life-threatening deterioration of the patient's condition requiring my urgent intervention. Total critical care time was at least 10 minutes. This includes vital sign monitoring, pulse oximetry monitoring, telemetry monitoring, clinical response to the IV medications, reviewing the nursing notes, consultation time, dictation/documentation time, and interpretation of the labwork. This excludes any separately billable procedures performed. Patient is afebrile and nontoxic in appearance. Labs reveal no leukocytosis, stable chronic anemia with a hemoglobin of 10.8. Metabolic panel shows no severe electrolyte derangements. Kidney function is at her baseline with a creatinine of 1.7, BUN 37 and GFR of 31. LFTs within normal limits. Lipase within normal limits. No lactic acidosis. Plain films and CT findings as above. I did consult general surgery regarding the CT of the abdomen pelvis showing partial obstruction versus ileus. Protein 6.3 (L) 6.4 - 8.2 g/dL    Alb 3.6 3.4 - 5.0 g/dL    Albumin/Globulin Ratio 1.3 1.1 - 2.2    Total Bilirubin <0.2 0.0 - 1.0 mg/dL    Alkaline Phosphatase 87 40 - 129 U/L    ALT 8 (L) 10 - 40 U/L    AST 11 (L) 15 - 37 U/L    Globulin 2.7 g/dL   Lipase   Result Value Ref Range    Lipase 23.0 13.0 - 60.0 U/L   Lactic Acid, Plasma   Result Value Ref Range    Lactic Acid 0.9 0.4 - 2.0 mmol/L   Sample possible blood bank testing   Result Value Ref Range    Specimen Status KAY        I estimate there is LOW risk for ACUTE APPENDICITIS, BOWEL OBSTRUCTION, CHOLECYSTITIS, DIVERTICULITIS, INCARCERATED HERNIA, PANCREATITIS, or PERFORATED BOWEL or ULCER, thus I consider the discharge disposition reasonable. Also, there is no evidence or peritonitis, sepsis, or toxicity. Sandy Tucker and I have discussed the diagnosis and risks, and we agree with discharging home to follow-up with their primary doctor in 2-3 days. We also discussed returning to the Emergency Department immediately if new or worsening symptoms occur. We have discussed the symptoms which are most concerning (e.g., bloody stool, fever, changing or worsening pain, vomiting) that necessitate immediate return. Patient verbalized understanding, has no further questions or concerns. They will be discharged home in stable condition. Blood pressure (!) 160/58, pulse 73, temperature 98 °F (36.7 °C), temperature source Oral, resp. rate 12, height 5' 4\" (1.626 m), weight (!) 370 lb (167.8 kg), SpO2 100 %. FINAL IMPRESSION    1. Enteritis    2. Ileus ([address]    3. Nausea    4.  Diarrhea, unspecified type        PLAN  Discharge with outpatient follow-up    (Please note that this note was completed with a voice recognition program.  Every attempt was made to edit the dictations, but inevitably there remain words that are mis-transcribed.)       Ирина Holt, CHIQUITA - CNP  08/27/20 5607

## 2020-08-27 NOTE — ED NOTES

## 2020-08-27 NOTE — ED NOTES
Bed: 23  Expected date:   Expected time:   Means of arrival:   Comments:  [address] [facility]  08/27/20 7796

## 2020-08-27 NOTE — ED NOTES
Pt came from [facility], has sanjiv lift under her, placed pt in gown, deaccessed port , did not flush and no blood return noted, re-accessed port with 1inch, 20gauge sanches needle, power port, flushed, blood return noted, placed a purewick , pt unable to stand, pt appreciative of care     Blayne Marks RN  08/27/20 9947

## 2020-08-28 NOTE — ED NOTES
Assumed care from [facility].. States patient's port was accessed on arrival. Area around port was swollen. Pt was deaccessed and reaccessed. Blood return noted and flushes easily.       Modesto Ashley RN  08/27/20 2053

## 2020-08-28 NOTE — ED NOTES
Report given to EMS; VSS; DC instructions reviewed with pt. Pt verbalized understanding; instructions and prescriptions sent with EMS.       Linsey Williamson RN  08/27/20 8744

## 2020-08-28 NOTE — ED NOTES
Patient identified as a positive fall risk on the ED triage fall screening. Patient placed in fall precautions which includes:  yellow fall risk bracelet on wrist, yellow socks on feet, \"Be Safe\" sign placed on patient's door, and bed alarm placed under patient/alarm turned on. Patient instructed on importance of not getting out of bed or ambulating without assistance for safety.           [facility] from [facility]; pt discharge set up; caring for pt until EMS arrival      Frida Garvey, [address] [facility]  08/27/20 2020

## 2020-09-04 ASSESSMENT — ENCOUNTER SYMPTOMS
CHEST TIGHTNESS: 0
ABDOMINAL PAIN: 1
SHORTNESS OF BREATH: 0
VOMITING: 1
STRIDOR: 0
WHEEZING: 0
NAUSEA: 1
RHINORRHEA: 0
SORE THROAT: 0
TROUBLE SWALLOWING: 0
DIARRHEA: 0
COUGH: 0

## 2020-11-20 ENCOUNTER — TELEPHONE (OUTPATIENT)
Dept: SURGERY | Age: 60
End: 2020-11-20

## 2020-11-20 NOTE — TELEPHONE ENCOUNTER
Janet from [facility] regarding the patient. The primary doctor at the home notieced the patients port is infected. She stated this is the second infection the patient has had with her port. The doctor is requesting the pt have surgery to remove the port. Please advise if we should bring the pt in to the office for a visit.

## 2020-11-23 NOTE — TELEPHONE ENCOUNTER
I called and spoke to nurse Richter- scheduled Port removal for 12/3/20, 11:30 am arrival time via transport, advised NPO after midnight- she is not taking any aspirin products or blood thinners. Daily meds with a small sip of water am only. She is sending out a culture today- and she will be covid tested again. Call the office with any questions.

## 2020-11-27 ENCOUNTER — ANESTHESIA EVENT (OUTPATIENT)
Dept: OPERATING ROOM | Age: 60
End: 2020-11-27
Payer: COMMERCIAL

## 2020-11-30 ENCOUNTER — TELEPHONE (OUTPATIENT)
Dept: SURGERY | Age: 60
End: 2020-11-30

## 2020-11-30 NOTE — TELEPHONE ENCOUNTER
Patient call. She is schedule with you to have port replaced on 12/3/2020. She states she was to have blood work done today, but the person who came to draw blood would not draw it from her port. So they did not get any blood from her today. She is asking what she needs to do since he would not draw it. Please adviise.

## 2020-11-30 NOTE — PROGRESS NOTES
PRE OP INSTRUCTION SHEET   1. Do not eat or drink anything after 12 midnight  prior to surgery. This includes no water, chewing gum or mints. 2. Take the following pills will a small sip of water (see MAR)                                        3. Aspirin, Ibuprofen, Advil, Naproxen, Vitamin E, fish oil and other Anti-inflammatory products should be stopped for 5 days before surgery or as directed by your physician. 4. Check with your Doctor regarding stopping Plavix, Coumadin, Lovenox, Fragmin or other blood thinners   5. Do not smoke, and do not drink any alcoholic beverages 24 hours prior to surgery. This includes NA Beer. 6. You may brush your teeth and gargle the morning of surgery. DO NOT SWALLOW WATER   7. You MUST make arrangements for a responsible adult to take you home after your surgery. You will not be allowed to leave alone or drive yourself home. It is strongly suggested someone stay with you the first 24 hrs. Your surgery will be cancelled if you do not have a ride home. 8. A parent/legal guardian must accompany a child scheduled for surgery and plan to stay at the hospital until the child is discharged. Please do not bring other children with you. 9. Please wear simple, loose fitting clothing to the hospital.  [place] not bring valuables (money, credit cards, checkbooks, etc.) Do not wear any makeup (including no eye makeup) or nail polish on your fingers or toes. 10. DO NOT wear any jewelry or piercings on day of surgery. All body piercing jewelry must be removed. 11. If you have dentures,glasses, or contacts they will be removed before going to the OR; we will provide you a container. 12. Please see your family doctor/and cardiologist for a history & physical and/or concerning medications. Bring any test results/reports from your physician's office. Have history and labs faxed to [phone].  Remember to bring Blood Bank bracelet on the day of surgery. 14. If you have a Living Will and Durable Power of  for Healthcare, please bring in a copy. 13. Notify your Surgeon if you develop any illness between now and surgery  time, cough, cold, fever, sore throat, nausea, vomiting, etc.  Please notify your surgeon if you experience dizziness, shortness of breath or blurred vision between now & the time of your surgery   16. DO NOT shave your operative site 96 hours prior to surgery. For face & neck surgery, men may use an electric razor 48 hours prior to surgery. 17. Shower with _x__Antibacterial soap (x_chlorhexidine for total joint  Pt's) shower two times before surgery.(the morning of and the night before. 18. To provide excellent care visitors will be limited to one in the room at any given time.   Please call pre admission testing if you any further questions [phone] or [phone]

## 2020-12-01 ENCOUNTER — TELEPHONE (OUTPATIENT)
Dept: SURGERY | Age: 60
End: 2020-12-01

## 2020-12-01 NOTE — TELEPHONE ENCOUNTER
I spoke with her Friday. I reassured both her and her nurse that it was OK to draw blood from the port, just not to infuse through it.  Also was instructed to continue her levaquin

## 2020-12-01 NOTE — TELEPHONE ENCOUNTER
JEAN from [facility] calling asking if it is okay to access the patient's port for a blood draw since it is infected, the patient would not allow them to draw her blood from her veins. They would also like to know if she is supposed to be holding her levaquin 500mg or if it is okay for her to take that.  JEAN's call back number is [phone]

## 2020-12-01 NOTE — TELEPHONE ENCOUNTER
Tre Valles from [facility] called back. She states patient has a scab that is about diameter of a quarter and is 1/8 inch thick at the port. She states for her to draw blood from it, she would have to puncture through the scab and is afraid of infection. She states the patient screams whenever they touch her near the port. Please advise.

## 2020-12-01 NOTE — TELEPHONE ENCOUNTER
I called [facility] and spoke to BONNIE Purvis. She states the patient screams whenever they touch the port. She does not think the needle will be able to go through the scab because of how deep it is. She is asking if they would be able to draw the blood here when she comes in for the port?  It was for a BMP and H&H.

## 2020-12-01 NOTE — TELEPHONE ENCOUNTER
There is already infection present, so they won't infect in any worse before thursday.  The patient requested that I allow them to use the port

## 2020-12-03 ENCOUNTER — HOSPITAL ENCOUNTER (OUTPATIENT)
Age: 60
Setting detail: OUTPATIENT SURGERY
Discharge: HOME | End: 2020-12-03
Attending: SURGERY | Admitting: SURGERY
Payer: COMMERCIAL

## 2020-12-03 ENCOUNTER — ANESTHESIA (OUTPATIENT)
Dept: OPERATING ROOM | Age: 60
End: 2020-12-03
Payer: COMMERCIAL

## 2020-12-03 VITALS
HEART RATE: 80 BPM | BODY MASS INDEX: 50.02 KG/M2 | TEMPERATURE: 98.2 F | WEIGHT: 293 LBS | HEIGHT: 64 IN | SYSTOLIC BLOOD PRESSURE: 172 MMHG | OXYGEN SATURATION: 99 % | DIASTOLIC BLOOD PRESSURE: 70 MMHG | RESPIRATION RATE: 14 BRPM

## 2020-12-03 VITALS — DIASTOLIC BLOOD PRESSURE: 71 MMHG | SYSTOLIC BLOOD PRESSURE: 173 MMHG | OXYGEN SATURATION: 100 %

## 2020-12-03 LAB
GLUCOSE BLD-MCNC: 119 MG/DL (ref 70–99)
GLUCOSE BLD-MCNC: 132 MG/DL (ref 70–99)
HCT VFR BLD CALC: 31.5 % (ref 36–48)
HEMOGLOBIN: 10.5 G/DL (ref 12–16)
MCH RBC QN AUTO: 32.3 PG (ref 26–34)
MCHC RBC AUTO-ENTMCNC: 33.3 G/DL (ref 31–36)
MCV RBC AUTO: 97.2 FL (ref 80–100)
PDW BLD-RTO: 13.1 % (ref 12.4–15.4)
PERFORMED ON: ABNORMAL
PERFORMED ON: ABNORMAL
PLATELET # BLD: 192 K/UL (ref 135–450)
PMV BLD AUTO: 10.1 FL (ref 5–10.5)
RBC # BLD: 3.24 M/UL (ref 4–5.2)
WBC # BLD: 7.9 K/UL (ref 4–11)

## 2020-12-03 PROCEDURE — 7100000011 HC PHASE II RECOVERY - ADDTL 15 MIN: Performed by: SURGERY

## 2020-12-03 PROCEDURE — 2500000003 HC RX 250 WO HCPCS: Performed by: NURSE ANESTHETIST, CERTIFIED REGISTERED

## 2020-12-03 PROCEDURE — 2580000003 HC RX 258: Performed by: ANESTHESIOLOGY

## 2020-12-03 PROCEDURE — 36590 REMOVAL TUNNELED CV CATH: CPT | Performed by: SURGERY

## 2020-12-03 PROCEDURE — 36415 COLL VENOUS BLD VENIPUNCTURE: CPT

## 2020-12-03 PROCEDURE — 85027 COMPLETE CBC AUTOMATED: CPT

## 2020-12-03 PROCEDURE — 3600000002 HC SURGERY LEVEL 2 BASE: Performed by: SURGERY

## 2020-12-03 PROCEDURE — 3700000001 HC ADD 15 MINUTES (ANESTHESIA): Performed by: SURGERY

## 2020-12-03 PROCEDURE — 3700000000 HC ANESTHESIA ATTENDED CARE: Performed by: SURGERY

## 2020-12-03 PROCEDURE — 2500000003 HC RX 250 WO HCPCS: Performed by: SURGERY

## 2020-12-03 PROCEDURE — 6360000002 HC RX W HCPCS: Performed by: NURSE ANESTHETIST, CERTIFIED REGISTERED

## 2020-12-03 PROCEDURE — 2500000003 HC RX 250 WO HCPCS: Performed by: ANESTHESIOLOGY

## 2020-12-03 PROCEDURE — 2709999900 HC NON-CHARGEABLE SUPPLY: Performed by: SURGERY

## 2020-12-03 PROCEDURE — 3600000012 HC SURGERY LEVEL 2 ADDTL 15MIN: Performed by: SURGERY

## 2020-12-03 PROCEDURE — 7100000010 HC PHASE II RECOVERY - FIRST 15 MIN: Performed by: SURGERY

## 2020-12-03 RX ORDER — LABETALOL HYDROCHLORIDE 5 MG/ML
5 INJECTION, SOLUTION INTRAVENOUS
Status: DISCONTINUED | OUTPATIENT
Start: 2020-12-03 | End: 2020-12-03 | Stop reason: HOSPADM

## 2020-12-03 RX ORDER — MEDROXYPROGESTERONE ACETATE 10 MG/1
TABLET ORAL
Status: ON HOLD | COMMUNITY
Start: 2020-04-20 | End: 2021-03-17

## 2020-12-03 RX ORDER — DIPHENHYDRAMINE HYDROCHLORIDE 50 MG/ML
6.25 INJECTION INTRAMUSCULAR; INTRAVENOUS
Status: DISCONTINUED | OUTPATIENT
Start: 2020-12-03 | End: 2020-12-03 | Stop reason: HOSPADM

## 2020-12-03 RX ORDER — ONDANSETRON 2 MG/ML
4 INJECTION INTRAMUSCULAR; INTRAVENOUS EVERY 30 MIN PRN
Status: DISCONTINUED | OUTPATIENT
Start: 2020-12-03 | End: 2020-12-03 | Stop reason: HOSPADM

## 2020-12-03 RX ORDER — OXYCODONE HYDROCHLORIDE AND ACETAMINOPHEN 5; 325 MG/1; MG/1
1 TABLET ORAL PRN
Status: DISCONTINUED | OUTPATIENT
Start: 2020-12-03 | End: 2020-12-03 | Stop reason: HOSPADM

## 2020-12-03 RX ORDER — SODIUM BICARBONATE 325 MG/1
650 TABLET ORAL 2 TIMES DAILY
Status: ON HOLD | COMMUNITY
End: 2021-03-17

## 2020-12-03 RX ORDER — MIDAZOLAM HYDROCHLORIDE 1 MG/ML
INJECTION INTRAMUSCULAR; INTRAVENOUS PRN
Status: DISCONTINUED | OUTPATIENT
Start: 2020-12-03 | End: 2020-12-03 | Stop reason: SDUPTHER

## 2020-12-03 RX ORDER — SODIUM CHLORIDE, SODIUM LACTATE, POTASSIUM CHLORIDE, CALCIUM CHLORIDE 600; 310; 30; 20 MG/100ML; MG/100ML; MG/100ML; MG/100ML
INJECTION, SOLUTION INTRAVENOUS
Status: COMPLETED
Start: 2020-12-03 | End: 2020-12-03

## 2020-12-03 RX ORDER — KETAMINE HYDROCHLORIDE 100 MG/ML
INJECTION, SOLUTION INTRAMUSCULAR; INTRAVENOUS PRN
Status: DISCONTINUED | OUTPATIENT
Start: 2020-12-03 | End: 2020-12-03 | Stop reason: SDUPTHER

## 2020-12-03 RX ORDER — SODIUM CHLORIDE 0.9 % (FLUSH) 0.9 %
10 SYRINGE (ML) INJECTION EVERY 12 HOURS SCHEDULED
Status: DISCONTINUED | OUTPATIENT
Start: 2020-12-03 | End: 2020-12-03 | Stop reason: HOSPADM

## 2020-12-03 RX ORDER — GLYCOPYRROLATE 0.2 MG/ML
INJECTION INTRAMUSCULAR; INTRAVENOUS PRN
Status: DISCONTINUED | OUTPATIENT
Start: 2020-12-03 | End: 2020-12-03 | Stop reason: SDUPTHER

## 2020-12-03 RX ORDER — LEVOFLOXACIN 500 MG/1
500 TABLET, FILM COATED ORAL DAILY
Status: ON HOLD | COMMUNITY
End: 2020-12-31 | Stop reason: ALTCHOICE

## 2020-12-03 RX ORDER — HYDRALAZINE HYDROCHLORIDE 20 MG/ML
5 INJECTION INTRAMUSCULAR; INTRAVENOUS EVERY 30 MIN PRN
Status: DISCONTINUED | OUTPATIENT
Start: 2020-12-03 | End: 2020-12-03 | Stop reason: HOSPADM

## 2020-12-03 RX ORDER — SODIUM CHLORIDE 0.9 % (FLUSH) 0.9 %
10 SYRINGE (ML) INJECTION PRN
Status: DISCONTINUED | OUTPATIENT
Start: 2020-12-03 | End: 2020-12-03 | Stop reason: HOSPADM

## 2020-12-03 RX ORDER — OXYCODONE HYDROCHLORIDE AND ACETAMINOPHEN 5; 325 MG/1; MG/1
2 TABLET ORAL PRN
Status: DISCONTINUED | OUTPATIENT
Start: 2020-12-03 | End: 2020-12-03 | Stop reason: HOSPADM

## 2020-12-03 RX ORDER — SODIUM CHLORIDE, SODIUM LACTATE, POTASSIUM CHLORIDE, CALCIUM CHLORIDE 600; 310; 30; 20 MG/100ML; MG/100ML; MG/100ML; MG/100ML
INJECTION, SOLUTION INTRAVENOUS CONTINUOUS
Status: DISCONTINUED | OUTPATIENT
Start: 2020-12-03 | End: 2020-12-03 | Stop reason: HOSPADM

## 2020-12-03 RX ORDER — MEPERIDINE HYDROCHLORIDE 25 MG/ML
12.5 INJECTION INTRAMUSCULAR; INTRAVENOUS; SUBCUTANEOUS EVERY 5 MIN PRN
Status: DISCONTINUED | OUTPATIENT
Start: 2020-12-03 | End: 2020-12-03 | Stop reason: HOSPADM

## 2020-12-03 RX ADMIN — FAMOTIDINE 20 MG: 10 INJECTION, SOLUTION INTRAVENOUS at 13:12

## 2020-12-03 RX ADMIN — KETAMINE HYDROCHLORIDE 20 MG: 100 INJECTION, SOLUTION, CONCENTRATE INTRAMUSCULAR; INTRAVENOUS at 14:06

## 2020-12-03 RX ADMIN — GLYCOPYRROLATE 0.2 MG: 0.2 INJECTION, SOLUTION INTRAMUSCULAR; INTRAVENOUS at 14:02

## 2020-12-03 RX ADMIN — MIDAZOLAM 2 MG: 1 INJECTION INTRAMUSCULAR; INTRAVENOUS at 13:53

## 2020-12-03 RX ADMIN — SODIUM CHLORIDE, POTASSIUM CHLORIDE, SODIUM LACTATE AND CALCIUM CHLORIDE: 600; 310; 30; 20 INJECTION, SOLUTION INTRAVENOUS at 13:10

## 2020-12-03 RX ADMIN — KETAMINE HYDROCHLORIDE 30 MG: 100 INJECTION, SOLUTION, CONCENTRATE INTRAMUSCULAR; INTRAVENOUS at 14:01

## 2020-12-03 ASSESSMENT — PULMONARY FUNCTION TESTS
PIF_VALUE: 0

## 2020-12-03 ASSESSMENT — PAIN - FUNCTIONAL ASSESSMENT: PAIN_FUNCTIONAL_ASSESSMENT: 0-10

## 2020-12-03 ASSESSMENT — PAIN SCALES - GENERAL
PAINLEVEL_OUTOF10: 4
PAINLEVEL_OUTOF10: 4

## 2020-12-03 NOTE — OP NOTE
[address]                 [address] [place] [address]                                OPERATIVE REPORT    PATIENT NAME: Juan Ruffin                   :        1960  MED REC NO:   [id number]                          ROOM:  ACCOUNT NO:   [de-identified]                           ADMIT DATE: 2020  PROVIDER:     Chaparrita Tidwell MD    DATE OF PROCEDURE:  2020    PREOPERATIVE DIAGNOSIS:  Infected Port-A-Cath. POSTOPERATIVE DIAGNOSIS:  Infected Port-A-Cath. OPERATION PERFORMED:  Port-A-Cath removal.    SURGEON:  Chaparrita Tidwell MD    ANESTHESIA:  Local with MAC. ESTIMATED BLOOD LOSS:  Less than 50 mL. INDICATIONS:  The patient is a 43-year-old woman, who presented with  port infection. She is brought in for removal.    OPERATIVE SUMMARY:  After preoperative evaluation, the patient was  brought into the operating suite and placed in a comfortable supine  position on a stretcher. Monitoring equipment was attached, and she was  given intravenous sedation per Anesthesia. Her right chest was  sterilely prepped and anesthetized with local anesthetic. An incision  was made over the port and dissected down to the pocket. The hub of the  catheter was dissected free of the surrounding structures, and a  pursestring suture of 3-0 Vicryl was placed across the catheter tract. The catheter was pulled and the pursestring was tied. The port was  passed off and the pocket was irrigated. It was packed with Surgicel  snow to control some inflammatory oozing. The incision was closed with  interrupted subcutaneous sutures of 3-0 Vicryl and a running  subcuticular suture of 4-0 Vicryl. Benzoin, Steri-Strips, and dry  sterile dressings were applied. All sponge, needle, and instrument  counts were correct at the end of the case. The patient tolerated the  procedure well and was taken to the recovery area in stable condition.         JENNIFER JACKSON

## 2020-12-03 NOTE — H&P
I have reviewed the progress note serving as history and physical dated December/2/ 2020 and examined the patient and find no relevant changes. I have reviewed with the patient and/or family the risks, benefits, and alternatives to the procedure.

## 2020-12-03 NOTE — ANESTHESIA POSTPROCEDURE EVALUATION
Department of Anesthesiology  Postprocedure Note    Patient: Mickey Rouse  MRN: [id number]  YOB: 1960  Date of evaluation: 12/3/2020  Time:  3:34 PM     Procedure Summary     Date:  12/03/20 Room / Location:  [address] [place] [address] / [facility]    Anesthesia Start:  3336 Anesthesia Stop:  3541    Procedure:  PORT REMOVAL (N/A Chest) Diagnosis:  (PORT DYSFUNCTION)    Surgeon:  Elle Waite MD Responsible Provider:  Mary Ann Rueda MD    Anesthesia Type:  MAC ASA Status:  4          Anesthesia Type: MAC    Pam Phase I: Pam Score: 10    Pam Phase II: Pam Score: 10    Last vitals: Reviewed and per EMR flowsheets.        Anesthesia Post Evaluation    Comments: Postoperative Anesthesia Note    Name:    Mickey Rouse  MRN:      [id number]    Patient Vitals in the past 12 hrs:  12/03/20 1447, BP:(!) 172/70, Temp:98.2 °F (36.8 °C), Pulse:80, Resp:14, SpO2:99 %  12/03/20 1423, BP:(!) 176/70, Temp:97.2 °F (36.2 °C), Pulse:84, Resp:16, SpO2:99 %  12/03/20 1142, BP:(!) 142/72, Temp:97.8 °F (36.6 °C), Temp src:Temporal, Pulse:65, Resp:16, SpO2:99 %     LABS:    CBC  Lab Results       Component                Value               Date/Time                  WBC                      7.9                 12/03/2020 01:05 PM        HGB                      10.5 (L)            12/03/2020 01:05 PM        HCT                      31.5 (L)            12/03/2020 01:05 PM        PLT                      192                 12/03/2020 01:05 PM   RENAL  Lab Results       Component                Value               Date/Time                  NA                       135 (L)             08/27/2020 02:26 PM        K                        3.8                 08/27/2020 02:26 PM        K                        3.6                 08/14/2020 08:16 AM        CL                       105                 08/27/2020 02:26 PM        CO2                      18 (L)              08/27/2020 02:26 PM

## 2020-12-03 NOTE — ANESTHESIA PRE PROCEDURE
Department of Anesthesiology  Preprocedure Note       Name:  Kory Duran   Age:  63 y.o.  :  1960                                          MRN:  [id number]         Date:  12/3/2020      Surgeon: Yasmany Good):  Chapin Platt MD    Procedure: Procedure(s):  PORT REMOVAL    Medications prior to admission:   Prior to Admission medications    Medication Sig Start Date End Date Taking? Authorizing Provider   levoFLOXacin (LEVAQUIN) 500 MG tablet Take 500 mg by mouth daily   Yes Historical Provider, MD   sodium bicarbonate 325 MG tablet Take 650 mg by mouth 2 times daily   Yes Historical Provider, MD   medroxyPROGESTERone (PROVERA) 10 MG tablet TAKE ONE TABLET BY MOUTH DAILY 20  Yes Historical Provider, MD   Cholecalciferol (VITAMIN D3) 2000 UNITS CAPS Take  by mouth daily. Yes Historical Provider, MD   Cyanocobalamin (VITAMIN B 12 PO) Take 1 tablet by mouth daily. Yes Historical Provider, MD   montelukast (SINGULAIR) 10 MG tablet Take 10 mg by mouth nightly. Yes Historical Provider, MD   ondansetron (ZOFRAN ODT) 4 MG disintegrating tablet Take 1-2 tablets by mouth every 12 hours as needed for Nausea May Sub regular tablet (non-ODT) if insurance does not cover ODT. 20   CHIQUITA Mora - CNP   insulin glargine (BASAGLAR KWIKPEN) 100 UNIT/ML injection pen Inject 28 Units into the skin 2 times daily Before breakfast and dinner 20   Anthony Meyers MD   insulin glargine (BASAGLAR KWIKPEN) 100 UNIT/ML injection pen Inject into the skin nightly    Historical Provider, MD   methocarbamol (ROBAXIN) 500 MG tablet TAKE ONE TABLET BY MOUTH TWICE A DAY 19   Pattie Reyna MD   diazepam (VALIUM) 10 MG tablet take 1 tablet by oral route  every bedtime    Historical Provider, MD   atorvastatin (LIPITOR) 40 MG tablet Take 40 mg by mouth    Historical Provider, MD   Misc.  Devices [facility]) MISC 1 each by Does not apply route daily 10/9/17   JAKE Rubio   insulin aspart (Olivia Chatoack FLEXPEN) 100 UNIT/ML injection pen Continue with your current aspart insulin regimen (fairly complex aspart system which is a combination of SSI, carb counting, scheduled post-meal insulin, and adjustments based on her level of nausea at the time - about 40u/day). Patient taking differently: Continue with your current aspart insulin regimen (fairly complex aspart system which is a combination of SSI, carb counting, scheduled post-meal insulin, and adjustments based on her level of nausea at the time - about 40u/day). 1 unit per 8 carbs and 1 unit for every 12 points but pt is unsure what her \"high\" number is. 7/17/17   Adi Simmons MD   timolol (BETIMOL) 0.25 % ophthalmic solution 1-2 drops 2 times daily    Historical Provider, MD   L-Lysine 500 MG CAPS Take  by mouth 4 times daily as needed. Historical Provider, MD   sertraline (ZOLOFT) 100 MG tablet Take 200 mg by mouth nightly. Historical Provider, MD   lactulose (CHRONULAC) 10 GM/15ML solution Take 20 g by mouth daily. Historical Provider, MD   ketorolac (ACULAR LS) 0.4 % SOLN ophthalmic solution 1 drop daily. 2/27/11   Historical Provider, MD   olopatadine (PATANOL) 0.1 % ophthalmic solution 1 drop 2 times daily. Historical Provider, MD   medroxyPROGESTERone (PROVERA) 2.5 MG tablet Take 10 mg by mouth daily.     Historical Provider, MD       Current medications:    Current Facility-Administered Medications   Medication Dose Route Frequency Provider Last Rate Last Dose    lactated ringers infusion   Intravenous Continuous Dayan Rebollar MD        sodium chloride flush 0.9 % injection 10 mL  10 mL Intravenous 2 times per day Dayan Rebollar MD        sodium chloride flush 0.9 % injection 10 mL  10 mL Intravenous PRN Dayan Rebollar MD        famotidine (PEPCID) injection 20 mg  20 mg Intravenous Once Dayan Rebollar MD        lactated ringers infusion             HYDROmorphone (DILAUDID) injection 0.5 mg 0.5 mg Intravenous Q10 Min PRN Kathyrn Hall MD        HYDROmorphone (DILAUDID) injection 0.5 mg  0.5 mg Intravenous Q5 Min PRN Kathryn Hall MD        oxyCODONE-acetaminophen (PERCOCET) 5-325 MG per tablet 1 tablet  1 tablet Oral PRN Kathryn Hall MD        Or    oxyCODONE-acetaminophen (PERCOCET) 5-325 MG per tablet 2 tablet  2 tablet Oral PRN Kathryn Hall MD        diphenhydrAMINE (BENADRYL) injection 6.25 mg  6.25 mg Intravenous Once PRN Kathryn Hall MD        ondansetron [facility]) injection 4 mg  4 mg Intravenous Q30 Min PRN Kathryn Hall MD        labetalol (NORMODYNE;TRANDATE) injection 5 mg  5 mg Intravenous Q15 Min PRN Kathryn Hall MD        hydrALAZINE (APRESOLINE) injection 5 mg  5 mg Intravenous Q30 Min PRN Kathryn Hall MD        meperidine (DEMEROL) injection 12.5 mg  12.5 mg Intravenous Q5 Min PRN Kathryn Hall MD           Allergies:     Allergies   Allergen Reactions    Adhesive Tape Dermatitis    Ambien [Zolpidem]      Made me wild    Codeine      Can take a little just not a lot at a time    Flurosyn [Fluocinolone Acetonide]      \"flurosceen eye dye\" severe HA, fainting    Morphine      Went bezerk    Tegaderm Ag Mesh [Silver] Dermatitis     All types of tegaderm and adhesives       Problem List:    Patient Active Problem List   Diagnosis Code    Diabetes mellitus ([facility] [address] E11.9    Essential hypertension I10    Vomiting R11.10    Morbid obesity due to excess calories ([facility]) E66.01    Chronic pain associated with significant psychosocial dysfunction G89.4    SBO (small bowel obstruction) ([facility]) L84.912    Metabolic encephalopathy W85.48    Chronic kidney disease, stage 3, mod decreased GFR ([facility]) N18.30    HLD (hyperlipidemia) E78.5    Contusion of toe, right U78.584T    Degenerative arthritis of thumb M18.10    Tendinitis of left wrist M77.8    Hand pain M79.643    Heel pain, chronic M79.673, G89.29    Pain in joint, lower leg M25.569    Knee joint replacement status Z96.659    Contusion of knee S80.00XA    Plantar fasciitis, bilateral M72.2    Bilateral chronic knee pain M25.561, M25.562, G89.29    Back pain M54.9    Lumbar sprain S33. 5XXA    Knee MCL sprain S83.419A    DDD (degenerative disc disease), lumbar M51.36    Acute pain of left shoulder M25.512    Right wrist pain M25.531    Right wrist sprain S63.501A    Rotator cuff strain S46.019A    Contusion of left shoulder S40.012A    Pain in scapula M89.8X1    Acute pain of right shoulder M25.511    DKA (diabetic ketoacidoses) ([facility]) E11.10    THEODORE (acute kidney injury) ([facility] [address] N17.9    NSTEMI (non-ST elevated myocardial infarction) ([facility] [address] I21.4    Diabetic ketoacidosis with coma ([facility]) E11.11    Non-traumatic rhabdomyolysis M62.82    Cervical pain M54.2    Closed avulsion fracture of lateral malleolus of left fibula S82. 62XA    Pain of right thumb M79.644    Acute left ankle pain M25.572    Gamekeeper's thumb of right hand S53. 31XA    Frequent falls R29.6       Past Medical History:        Diagnosis Date    Acid reflux     Acute renal failure ([facility] [address] 1/2011    resolved    Anemia 2011    Arthritis     causing severe lower back pains    Asthma     Bronchitis     Bursitis     all joints    C. difficile colitis     Diabetes mellitus ([facility] [address]     age 4   Gloriajean Seeds Herpes     5    History of blood transfusion     Hypercholesteremia     Hypertension     Migraine     Neuropathy     hips to legs    Obesity     Psychiatric problem     anxiety and depression    Recurrent sinus infections     Renal failure     in past with 8 dialysis treatments    Retinopathy of both eyes     Sleep apnea     can't use a cpap    Ulcer, stomach peptic     x 3    Unspecified cerebral artery occlusion with cerebral infarction     \"silent stroke\"    Unspecified diseases of blood and blood-forming organs     bled for 1 year straight at age 4       Past Surgical History: Procedure Laterality Date    ABDOMEN SURGERY      ABDOMINAL EXPLORATION SURGERY  13    EXPLORATORY LAPAROTOMY LYSIS OF ADHESIONS    APPENDECTOMY      CARPAL TUNNEL RELEASE      both hands    CATARACT REMOVAL WITH IMPLANT  11    right    CATARACT REMOVAL WITH IMPLANT  2011    left eye     SECTION      COLONOSCOPY      DILATATION, ESOPHAGUS      ENDOSCOPY, COLON, DIAGNOSTIC      EYE SURGERY      laser, vitrectomy ou, cataract ou    FRACTURE SURGERY      HYSTERECTOMY      partial    JOINT REPLACEMENT      both knees    OTHER SURGICAL HISTORY  8/15/11    Left Shoulder Decompression    SHOULDER SURGERY      right     SUBTOTAL COLECTOMY  2000    TONSILLECTOMY      TUNNELED VENOUS PORT PLACEMENT  11    TUNNELED VENOUS PORT PLACEMENT  3/1/13    Devonte Bustillos       Social History:    Social History     Tobacco Use    Smoking status: Never Smoker    Smokeless tobacco: Never Used   Substance Use Topics    Alcohol use: No                                Counseling given: Not Answered      Vital Signs (Current):   Vitals:    20 1636 20 1142   BP:  (!) 142/72   Pulse:  65   Resp:  16   Temp:  97.8 °F (36.6 °C)   TempSrc:  Temporal   SpO2:  99%   Weight: (!) 367 lb (166.5 kg)    Height: 5' 4\" (1.626 m)                                               BP Readings from Last 3 Encounters:   20 (!) 142/72   20 120/89   20 (!) 165/42       NPO Status:                                                                                 BMI:   Wt Readings from Last 3 Encounters:   20 (!) 367 lb (166.5 kg)   20 (!) 370 lb (167.8 kg)   20 (!) 372 lb (168.7 kg)     Body mass index is 63 kg/m².     CBC:   Lab Results   Component Value Date    WBC 10.0 2020    RBC 3.27 2020    HGB 10.8 2020    HCT 32.9 2020    .6 2020    RDW 13.8 2020     2020       CMP:   Lab Results   Component Value Date     08/27/2020    K 3.8 08/27/2020    K 3.6 08/14/2020     08/27/2020    CO2 18 08/27/2020    BUN 37 08/27/2020    CREATININE 1.7 08/27/2020    GFRAA 37 08/27/2020    GFRAA >60 03/22/2013    AGRATIO 1.3 08/27/2020    LABGLOM 31 08/27/2020    GLUCOSE 302 08/27/2020    PROT 6.3 08/27/2020    PROT 5.0 01/27/2013    CALCIUM 10.1 08/27/2020    BILITOT <0.2 08/27/2020    ALKPHOS 87 08/27/2020    AST 11 08/27/2020    ALT 8 08/27/2020       POC Tests: No results for input(s): POCGLU, POCNA, POCK, POCCL, POCBUN, POCHEMO, POCHCT in the last 72 hours. Coags:   Lab Results   Component Value Date    PROTIME 12.4 07/04/2017    INR 1.10 07/04/2017    APTT 40.9 07/07/2017       HCG (If Applicable): No results found for: PREGTESTUR, PREGSERUM, HCG, HCGQUANT     ABGs:   Lab Results   Component Value Date    PHART 7.202 07/04/2017    PO2ART 170.6 07/04/2017    TIR7DGS 27.3 07/04/2017    PXR2SOB 10.5 07/04/2017    BEART -16.1 07/04/2017    F7PKLKWS 98.4 07/04/2017        Type & Screen (If Applicable):  Lab Results   Component Value Date    LABABO A 02/10/2011    LABRH Negative 02/10/2011       Drug/Infectious Status (If Applicable):  Lab Results   Component Value Date    HEPCAB Non-Reactive (Negative) 04/15/2011       COVID-19 Screening (If Applicable):   Lab Results   Component Value Date    COVID19 Not Detected 08/11/2020         Anesthesia Evaluation  Patient summary reviewed and Nursing notes reviewed no history of anesthetic complications:   Airway: Mallampati: III     Neck ROM: full   Dental:          Pulmonary:   (+) sleep apnea:  asthma:                            Cardiovascular:    (+) hypertension:, past MI:,                   Neuro/Psych:   (+) CVA:, neuromuscular disease:, headaches:, psychiatric history:            GI/Hepatic/Renal:   (+) GERD:, PUD, morbid obesity (Super morbid obesity)          Endo/Other:    (+) Diabetes, .                  Abdominal:           Vascular: Anesthesia Plan      MAC     ASA 4       Induction: intravenous. Anesthetic plan and risks discussed with patient. Plan discussed with CRNA.                   Adolfo Cano MD   12/3/2020

## 2020-12-09 ENCOUNTER — APPOINTMENT (OUTPATIENT)
Dept: GENERAL RADIOLOGY | Age: 60
End: 2020-12-09
Payer: COMMERCIAL

## 2020-12-09 ENCOUNTER — HOSPITAL ENCOUNTER (EMERGENCY)
Age: 60
Discharge: HOME | End: 2020-12-10
Payer: COMMERCIAL

## 2020-12-09 ENCOUNTER — APPOINTMENT (OUTPATIENT)
Dept: CT IMAGING | Age: 60
End: 2020-12-09
Payer: COMMERCIAL

## 2020-12-09 ENCOUNTER — TELEPHONE (OUTPATIENT)
Dept: SURGERY | Age: 60
End: 2020-12-09

## 2020-12-09 LAB
A/G RATIO: 1.3 (ref 1.1–2.2)
ALBUMIN SERPL-MCNC: 3.9 G/DL (ref 3.4–5)
ALP BLD-CCNC: 74 U/L (ref 40–129)
ALT SERPL-CCNC: 8 U/L (ref 10–40)
ANION GAP SERPL CALCULATED.3IONS-SCNC: 12 MMOL/L (ref 3–16)
AST SERPL-CCNC: 20 U/L (ref 15–37)
BASOPHILS ABSOLUTE: 0 K/UL (ref 0–0.2)
BASOPHILS RELATIVE PERCENT: 0.5 %
BILIRUB SERPL-MCNC: 0.3 MG/DL (ref 0–1)
BILIRUBIN URINE: NEGATIVE
BLOOD, URINE: NEGATIVE
BUN BLDV-MCNC: 10 MG/DL (ref 7–20)
CALCIUM SERPL-MCNC: 9.8 MG/DL (ref 8.3–10.6)
CHLORIDE BLD-SCNC: 97 MMOL/L (ref 99–110)
CLARITY: CLEAR
CO2: 26 MMOL/L (ref 21–32)
COLOR: NORMAL
CREAT SERPL-MCNC: 1.1 MG/DL (ref 0.6–1.2)
EOSINOPHILS ABSOLUTE: 0.1 K/UL (ref 0–0.6)
EOSINOPHILS RELATIVE PERCENT: 2 %
GFR AFRICAN AMERICAN: >60
GFR NON-AFRICAN AMERICAN: 50
GLOBULIN: 2.9 G/DL
GLUCOSE BLD-MCNC: 132 MG/DL (ref 70–99)
GLUCOSE BLD-MCNC: 135 MG/DL (ref 70–99)
GLUCOSE URINE: NEGATIVE MG/DL
HCT VFR BLD CALC: 36.8 % (ref 36–48)
HEMOGLOBIN: 12.2 G/DL (ref 12–16)
KETONES, URINE: NEGATIVE MG/DL
LACTIC ACID: 0.8 MMOL/L (ref 0.4–2)
LEUKOCYTE ESTERASE, URINE: NEGATIVE
LIPASE: 10 U/L (ref 13–60)
LYMPHOCYTES ABSOLUTE: 1.1 K/UL (ref 1–5.1)
LYMPHOCYTES RELATIVE PERCENT: 15.4 %
MCH RBC QN AUTO: 31.9 PG (ref 26–34)
MCHC RBC AUTO-ENTMCNC: 33.2 G/DL (ref 31–36)
MCV RBC AUTO: 96.2 FL (ref 80–100)
MICROSCOPIC EXAMINATION: NORMAL
MONOCYTES ABSOLUTE: 0.5 K/UL (ref 0–1.3)
MONOCYTES RELATIVE PERCENT: 6.2 %
NEUTROPHILS ABSOLUTE: 5.6 K/UL (ref 1.7–7.7)
NEUTROPHILS RELATIVE PERCENT: 75.9 %
NITRITE, URINE: NEGATIVE
PDW BLD-RTO: 13.2 % (ref 12.4–15.4)
PERFORMED ON: ABNORMAL
PH UA: 6.5 (ref 5–8)
PLATELET # BLD: 248 K/UL (ref 135–450)
PMV BLD AUTO: 10.1 FL (ref 5–10.5)
POTASSIUM SERPL-SCNC: 4 MMOL/L (ref 3.5–5.1)
PROTEIN UA: NEGATIVE MG/DL
RBC # BLD: 3.82 M/UL (ref 4–5.2)
SODIUM BLD-SCNC: 135 MMOL/L (ref 136–145)
SPECIFIC GRAVITY UA: 1.01 (ref 1–1.03)
TOTAL PROTEIN: 6.8 G/DL (ref 6.4–8.2)
URINE TYPE: NORMAL
UROBILINOGEN, URINE: 0.2 E.U./DL
WBC # BLD: 7.3 K/UL (ref 4–11)

## 2020-12-09 PROCEDURE — 6370000000 HC RX 637 (ALT 250 FOR IP): Performed by: NURSE PRACTITIONER

## 2020-12-09 PROCEDURE — 96375 TX/PRO/DX INJ NEW DRUG ADDON: CPT

## 2020-12-09 PROCEDURE — 74177 CT ABD & PELVIS W/CONTRAST: CPT

## 2020-12-09 PROCEDURE — 99285 EMERGENCY DEPT VISIT HI MDM: CPT

## 2020-12-09 PROCEDURE — 73610 X-RAY EXAM OF ANKLE: CPT

## 2020-12-09 PROCEDURE — 6360000002 HC RX W HCPCS: Performed by: NURSE PRACTITIONER

## 2020-12-09 PROCEDURE — 80053 COMPREHEN METABOLIC PANEL: CPT

## 2020-12-09 PROCEDURE — 83690 ASSAY OF LIPASE: CPT

## 2020-12-09 PROCEDURE — 6360000004 HC RX CONTRAST MEDICATION: Performed by: NURSE PRACTITIONER

## 2020-12-09 PROCEDURE — 83605 ASSAY OF LACTIC ACID: CPT

## 2020-12-09 PROCEDURE — 81003 URINALYSIS AUTO W/O SCOPE: CPT

## 2020-12-09 PROCEDURE — 85025 COMPLETE CBC W/AUTO DIFF WBC: CPT

## 2020-12-09 PROCEDURE — 96374 THER/PROPH/DIAG INJ IV PUSH: CPT

## 2020-12-09 RX ORDER — KETOROLAC TROMETHAMINE 30 MG/ML
30 INJECTION, SOLUTION INTRAMUSCULAR; INTRAVENOUS ONCE
Status: COMPLETED | OUTPATIENT
Start: 2020-12-09 | End: 2020-12-09

## 2020-12-09 RX ORDER — TIZANIDINE 4 MG/1
4 TABLET ORAL EVERY 6 HOURS PRN
Status: DISCONTINUED | OUTPATIENT
Start: 2020-12-09 | End: 2020-12-10 | Stop reason: HOSPADM

## 2020-12-09 RX ORDER — ONDANSETRON 2 MG/ML
4 INJECTION INTRAMUSCULAR; INTRAVENOUS ONCE
Status: COMPLETED | OUTPATIENT
Start: 2020-12-09 | End: 2020-12-09

## 2020-12-09 RX ADMIN — KETOROLAC TROMETHAMINE 30 MG: 30 INJECTION, SOLUTION INTRAMUSCULAR at 18:41

## 2020-12-09 RX ADMIN — TIZANIDINE 4 MG: 4 TABLET ORAL at 23:59

## 2020-12-09 RX ADMIN — ONDANSETRON 4 MG: 2 INJECTION INTRAMUSCULAR; INTRAVENOUS at 18:41

## 2020-12-09 RX ADMIN — IOPAMIDOL 75 ML: 755 INJECTION, SOLUTION INTRAVENOUS at 19:21

## 2020-12-09 ASSESSMENT — PAIN SCALES - GENERAL
PAINLEVEL_OUTOF10: 10

## 2020-12-09 NOTE — ED NOTES
Belly pain for the past 18 weeks and she has a sore left ankle and toe. Pt is from [facility]. Pt states she always has diarrhea and now it is pure water. Every time that she eats it hurts. Pt only has 1 foot of colon and Dr Yasmine Grimaldo removed the port in the upper right clavicle due to infection. She will have another one placed in early January. Pt has no pain medication at home except for muscle relaxers. Pt states she is a DNR, she is alert and oriented. Paperwork did not come with her to the facility.       Burton Chaudhari RN  12/09/20 8839

## 2020-12-09 NOTE — ED NOTES
JORDAN Blanchard at bedside for pt evaluation  Warm blankets provided for pt.       Tamika Osuna RN  12/09/20 0798

## 2020-12-10 ENCOUNTER — TELEPHONE (OUTPATIENT)
Dept: SURGERY | Age: 60
End: 2020-12-10

## 2020-12-10 VITALS
RESPIRATION RATE: 20 BRPM | HEIGHT: 64 IN | HEART RATE: 72 BPM | OXYGEN SATURATION: 97 % | TEMPERATURE: 98.8 F | WEIGHT: 293 LBS | BODY MASS INDEX: 50.02 KG/M2 | SYSTOLIC BLOOD PRESSURE: 121 MMHG | DIASTOLIC BLOOD PRESSURE: 52 MMHG

## 2020-12-10 LAB
CHP ED QC CHECK: YES
GLUCOSE BLD-MCNC: 89 MG/DL
GLUCOSE BLD-MCNC: 89 MG/DL (ref 70–99)
PERFORMED ON: NORMAL

## 2020-12-10 PROCEDURE — 6370000000 HC RX 637 (ALT 250 FOR IP): Performed by: NURSE PRACTITIONER

## 2020-12-10 RX ADMIN — TIZANIDINE 4 MG: 4 TABLET ORAL at 07:05

## 2020-12-10 ASSESSMENT — PAIN DESCRIPTION - LOCATION: LOCATION: ABDOMEN;BACK

## 2020-12-10 ASSESSMENT — PAIN SCALES - GENERAL: PAINLEVEL_OUTOF10: 9

## 2020-12-10 NOTE — ED NOTES
Pt resting on cot in position of comfort. Respirations regular. Airway intact. Audible snores noted from bedside.  0 s/s of distress. Awaiting further orders. Will continue to monitor.        Bridgett Tan RN  12/10/20 1787

## 2020-12-10 NOTE — ED NOTES
No changes in pt condition. Pt remains sleeping. Resp easy and regular.        Thom Jones RN  12/10/20 3341

## 2020-12-10 NOTE — ED NOTES
Patient identified as a positive fall risk on the ED triage fall screening. Patient placed in fall precautions which includes:  yellow fall risk bracelet on wrist, yellow socks on feet, \"Be Safe\" sign placed on patient's door, and bed alarm placed under patient/alarm turned on. Patient instructed on importance of not getting out of bed or ambulating without assistance for safety.           Victor Hugo Alex RN  12/09/20 1950

## 2020-12-10 NOTE — PROGRESS NOTES
Call placed to [facility] @ 2013  Re: abdominal pain per NP [facility]  Dr. Jodi Anguiano @ 2016

## 2020-12-10 NOTE — TELEPHONE ENCOUNTER
I called patient to scheduled surgery for new port insertion. She states the [facility] scheduled her for a post-op from port removal on 12/15/2020, but she has to come by ambulance and she does not want to come in for post-op if she does not need to. Do you want her to come in for post-op, or is it ok to go ahead and schedule her surgery for new port insertion?

## 2020-12-10 NOTE — ED NOTES
Pt scores as fall risk. Fall band on, call light within reach, non skid socks on, bed alarm on and bed alarm on. Pt alert and oriented.       Karyle Minder, RN  12/09/20 2052

## 2020-12-11 ENCOUNTER — TELEPHONE (OUTPATIENT)
Dept: SURGERY | Age: 60
End: 2020-12-11

## 2020-12-11 NOTE — TELEPHONE ENCOUNTER
Pt had port removal surgery on 12/03. Pt has follow up visit scheduled for 12/15 pt is curious if we could change that visit to a VV she is having a lot of problems getting around and it is very hard for her to leave with transport. She is also wondering if the nurses at the facility can send in notes regarding wound care. She said she can have a nurse sit with her for her VV also if needed. Please advise.

## 2020-12-11 NOTE — TELEPHONE ENCOUNTER
Informed pt of no post op, cancelled appt. She was also concerned about what day to schedule her surgery. 2 Weeks would put her new port surgery on 12/24 she was worried we aren't scheduling that day. I told her I would talk to Dr. Beverly Restrepo, and the surgery scheduler about what day we can get her in and someone would call back to get it scheduled. She did say she would be okay with having surgery at [facility] if needed.

## 2020-12-13 NOTE — ED PROVIDER NOTES
[facility]  ED  EMERGENCY DEPARTMENT ENCOUNTER      This patient was not seen and evaluated by the attending physician. Pt Name: Sydney Crump  MRN: [id number]  [place] 1960  Date of evaluation: 12/9/2020  Provider: CHIQUITA Coyle - CNP-C  PCP: Axel Boateng MD      History provided by the patient. CHIEFCOMPLAINT:     Chief Complaint   Patient presents with    Abdominal Pain      x 18 weeks, lives at [facility]        HISTORY OF PRESENT ILLNESS:      Sydney Crump is a 63 y.o. female who presents to [facility]  ED with complaints of abdominal pain. Patient states that she has had abdominal pain for the last 18 weeks. Patient has not called her GI doctor. Patient with history of obstructions. Patient also complaining of chronic headaches and left ankle pain for at least 8 weeks. Patient has no acute complaints today. Patient asking for IV dilaudid. Here for further evaluation. Chelsea Zarate  SEVERITY:10  DURATION:chronic  MODIFYING FACTORS:none noted    Nursing Notes were reviewed     REVIEW OF SYSTEMS:     Review of Systems  All systems, a total of 10, are reviewed and negative except for those that were just noted in history present illness.         PAST MEDICAL HISTORY:     Past Medical History:   Diagnosis Date    Acid reflux     Acute renal failure ([address] 1/2011    resolved    Anemia 2011    Arthritis     causing severe lower back pains    Asthma     Bronchitis     Bursitis     all joints    C. difficile colitis     Diabetes mellitus ([address]     age 4   Vestal Dieudonne Herpes     4    History of blood transfusion     Hypercholesteremia     Hypertension     Migraine     Neuropathy     hips to legs    Obesity     Psychiatric problem     anxiety and depression    Recurrent sinus infections     Renal failure     in past with 8 dialysis treatments    Retinopathy of both eyes     Sleep apnea     can't use a cpap    Ulcer, stomach peptic     x 3    Unspecified cerebral artery occlusion with cerebral infarction     \"silent stroke\"    Unspecified diseases of blood and blood-forming organs     bled for 1 year straight at age 4         SURGICAL HISTORY:      Past Surgical History:   Procedure Laterality Date    ABDOMEN SURGERY      ABDOMINAL EXPLORATION SURGERY  13    EXPLORATORY LAPAROTOMY LYSIS OF ADHESIONS    APPENDECTOMY      CARPAL TUNNEL RELEASE      both hands    CATARACT REMOVAL WITH IMPLANT  11    right    CATARACT REMOVAL WITH IMPLANT  2011    left eye     SECTION      COLONOSCOPY      DILATATION, ESOPHAGUS      ENDOSCOPY, COLON, DIAGNOSTIC      EYE SURGERY      laser, vitrectomy ou, cataract ou    FRACTURE SURGERY      HYSTERECTOMY      partial    JOINT REPLACEMENT      both knees    OTHER SURGICAL HISTORY  8/15/11    Left Shoulder Decompression    OTHER SURGICAL HISTORY      PORT REMOVAL (N/A Chest    PORT SURGERY N/A 12/3/2020    PORT REMOVAL performed by Irving P[facility]le [facility] at [facility]      right     SUBTOTAL COLECTOMY  2000    TONSILLECTOMY      TUNNELED VENOUS PORT PLACEMENT  11    TUNNELED VENOUS PORT PLACEMENT  3/1/13    Devonte Bustillos         CURRENT MEDICATIONS:       Discharge Medication List as of 2020  8:41 PM      CONTINUE these medications which have NOT CHANGED    Details   levoFLOXacin (LEVAQUIN) 500 MG tablet Take 500 mg by mouth dailyHistorical Med      sodium bicarbonate 325 MG tablet Take 650 mg by mouth 2 times dailyHistorical Med      !! medroxyPROGESTERone (PROVERA) 10 MG tablet TAKE ONE TABLET BY MOUTH DAILYHistorical Med      ondansetron (ZOFRAN ODT) 4 MG disintegrating tablet Take 1-2 tablets by mouth every 12 hours as needed for Nausea May Sub regular tablet (non-ODT) if insurance does not cover ODT., Disp-12 tablet,R-0Print      !! insulin glargine (BASAGLAR KWIKPEN) 100 UNIT/ML injection pen Inject 28 Units into the skin 2 times daily Before breakfast and dinner, Disp-5 pen,R-3NO PRINT      !! insulin glargine (BASAGLAR KWIKPEN) 100 UNIT/ML injection pen Inject into the skin nightlyHistorical Med      methocarbamol (ROBAXIN) 500 MG tablet TAKE ONE TABLET BY MOUTH TWICE A DAY, Disp-60 tablet, R-0Normal      diazepam (VALIUM) 10 MG tablet take 1 tablet by oral route  every bedtimeHistorical Med      atorvastatin (LIPITOR) 40 MG tablet Take 40 mg by mouthHistorical Med      Misc. Devices Juan C Sanes) MISC DAILY Starting Mon 10/9/2017, Disp-1 each, R-0, Print      insulin aspart (NOVOLOG FLEXPEN) 100 UNIT/ML injection pen Continue with your current aspart insulin regimen (fairly complex aspart system which is a combination of SSI, carb counting, scheduled post-meal insulin, and adjustments based on her level of nausea at the time - about 40u/day). , Disp-5 Pen, R-3NO PRIN T      timolol (BETIMOL) 0.25 % ophthalmic solution 1-2 drops 2 times daily      Cholecalciferol (VITAMIN D3) 2000 UNITS CAPS Take  by mouth daily. Cyanocobalamin (VITAMIN B 12 PO) Take 1 tablet by mouth daily. L-Lysine 500 MG CAPS Take  by mouth 4 times daily as needed. sertraline (ZOLOFT) 100 MG tablet Take 200 mg by mouth nightly. lactulose (CHRONULAC) 10 GM/15ML solution Take 20 g by mouth daily. ketorolac (ACULAR LS) 0.4 % SOLN ophthalmic solution 1 drop daily. olopatadine (PATANOL) 0.1 % ophthalmic solution 1 drop 2 times daily. !! medroxyPROGESTERone (PROVERA) 2.5 MG tablet Take 10 mg by mouth daily. montelukast (SINGULAIR) 10 MG tablet Take 10 mg by mouth nightly. !! - Potential duplicate medications found. Please discuss with provider.             ALLERGIES:    Adhesive tape, Ambien [zolpidem], Codeine, Flurosyn [fluocinolone acetonide], Morphine, and Tegaderm ag mesh [silver]    FAMILY HISTORY:       Family History   Problem Relation Age of Onset    Diabetes Father     Heart Disease Father mi    High Blood Pressure Father     High Cholesterol Father     Diabetes Paternal Aunt     Heart Disease Paternal Aunt     High Blood Pressure Paternal Aunt     High Cholesterol Paternal Aunt     Cancer Paternal Aunt         ovarian    Mult Sclerosis Paternal Aunt     Dementia Mother     High Blood Pressure Mother     Heart Disease Maternal Grandmother     Heart Disease Paternal Grandmother     Diabetes Paternal Uncle     Heart Disease Paternal Uncle     High Blood Pressure Paternal Uncle     High Cholesterol Paternal Uncle     Cancer Paternal Uncle     Cancer Maternal Grandfather         lung          SOCIAL HISTORY:     Social History     Socioeconomic History    Marital status:      Spouse name: None    Number of children: None    Years of education: None    Highest education level: None   Occupational History    None   Social Needs    Financial resource strain: None    Food insecurity     Worry: None     Inability: None    Transportation needs     Medical: None     Non-medical: None   Tobacco Use    Smoking status: Never Smoker    Smokeless tobacco: Never Used   Substance and Sexual Activity    Alcohol use: No    Drug use: No    Sexual activity: Never   Lifestyle    Physical activity     Days per week: None     Minutes per session: None    Stress: None   Relationships    Social connections     Talks on phone: None     Gets together: None     Attends Synagogue service: None     Active member of club or organization: None     Attends meetings of clubs or organizations: None     Relationship status: None    Intimate partner violence     Fear of current or ex partner: None     Emotionally abused: None     Physically abused: None     Forced sexual activity: None   Other Topics Concern    None   Social History Narrative    None       SCREENINGS:             PHYSICAL EXAM:       ED Triage Vitals [12/09/20 1749]   BP Temp Temp src Pulse Resp SpO2 Height Weight   (!) 168/70 12.4 - 15.4 %    Platelets 963 631 - 204 K/uL    MPV 10.1 5.0 - 10.5 fL    Neutrophils % 75.9 %    Lymphocytes % 15.4 %    Monocytes % 6.2 %    Eosinophils % 2.0 %    Basophils % 0.5 %    Neutrophils Absolute 5.6 1.7 - 7.7 K/uL    Lymphocytes Absolute 1.1 1.0 - 5.1 K/uL    Monocytes Absolute 0.5 0.0 - 1.3 K/uL    Eosinophils Absolute 0.1 0.0 - 0.6 K/uL    Basophils Absolute 0.0 0.0 - 0.2 K/uL   Comprehensive metabolic panel   Result Value Ref Range    Sodium 135 (L) 136 - 145 mmol/L    Potassium 4.0 3.5 - 5.1 mmol/L    Chloride 97 (L) 99 - 110 mmol/L    CO2 26 21 - 32 mmol/L    Anion Gap 12 3 - 16    Glucose 135 (H) 70 - 99 mg/dL    BUN 10 7 - 20 mg/dL    CREATININE 1.1 0.6 - 1.2 mg/dL    GFR Non-African American 50 (A) >60    GFR African American >60 >60    Calcium 9.8 8.3 - 10.6 mg/dL    Total Protein 6.8 6.4 - 8.2 g/dL    Alb 3.9 3.4 - 5.0 g/dL    Albumin/Globulin Ratio 1.3 1.1 - 2.2    Total Bilirubin 0.3 0.0 - 1.0 mg/dL    Alkaline Phosphatase 74 40 - 129 U/L    ALT 8 (L) 10 - 40 U/L    AST 20 15 - 37 U/L    Globulin 2.9 g/dL   Lipase   Result Value Ref Range    Lipase 10.0 (L) 13.0 - 60.0 U/L   Urinalysis, reflex to microscopic   Result Value Ref Range    Color, UA Straw Straw/Yellow    Clarity, UA Clear Clear    Glucose, Ur Negative Negative mg/dL    Bilirubin Urine Negative Negative    Ketones, Urine Negative Negative mg/dL    Specific Gravity, UA 1.010 1.005 - 1.030    Blood, Urine Negative Negative    pH, UA 6.5 5.0 - 8.0    Protein, UA Negative Negative mg/dL    Urobilinogen, Urine 0.2 <2.0 E.U./dL    Nitrite, Urine Negative Negative    Leukocyte Esterase, Urine Negative Negative    Microscopic Examination Not Indicated     Urine Type NotGiven    Lactic acid, plasma   Result Value Ref Range    Lactic Acid 0.8 0.4 - 2.0 mmol/L   POCT Glucose   Result Value Ref Range    POC Glucose 132 (H) 70 - 99 mg/dl    Performed on ACCU-CHEK    POCT Glucose   Result Value Ref Range    Glucose 89 mg/dL    QC OK?  yes POCT Glucose   Result Value Ref Range    POC Glucose 89 70 - 99 mg/dl    Performed on ACCU-CHEK          RADIOLOGY:  All x-ray studies are viewed/reviewed by me. Formal interpretations per the radiologist are as follows:      CT ABDOMEN PELVIS W IV CONTRAST Additional Contrast? None   Final Result   Gaseous dilation of the large bowel, and to a lesser extent the small bowel,   but without a clear zone of transition. Findings are most compatible with   severe ileus. While not entirely excluded, obstruction is considered less   likely. XR ANKLE LEFT (MIN 3 VIEWS)   Final Result   Limited study due to positioning and osteopenia. No obvious fracture is   identified. Findings are similar to previous study. Mild soft tissue swelling. EKG:  See EKG interpretation by an attending physician. PROCEDURES:   N/A    CRITICAL CARE TIME:   N/A    CONSULTS:  IP CONSULT TO GI      EMERGENCY DEPARTMENT COURSE andDIFFERENTIAL DIAGNOSIS/MDM:   Vitals:    Vitals:    12/09/20 2334 12/10/20 0533 12/10/20 0645 12/10/20 0950   BP: (!) 147/62 (!) 136/55 (!) 148/59 (!) 121/52   Pulse: 70 71 72 72   Resp: 18 18 20 20   Temp:       SpO2: 98% 97% 97% 97%   Weight:       Height:           Patient wasgiven the following medications:  Medications   ondansetron (ZOFRAN) injection 4 mg (4 mg Intravenous Given 12/9/20 1841)   ketorolac (TORADOL) injection 30 mg (30 mg Intravenous Given 12/9/20 1841)   iopamidol (ISOVUE-370) 76 % injection 75 mL (75 mLs Intravenous Given 12/9/20 1921)         Patient was evaluated independently by myself with the attending physician available for consultation. patient presented to the ER with complaints of chronic abdominal pain and chronic ankle pain. Patient with no acute complaints. Patient requesting IV dilaudid for her chronic pain. I explained to patient that I have no reason to feel like IV narcotics were necessary treatment. Patient labs were unremarkable.  CT showed ileus with no obstruction. Patient imaging of ankle was negative. I consulted GI on patient and spoke with DR. Jain. He felt patient was safe for d/c home for follow up as outpatient. Patient discharged in good condition. Patient laboratory studies, radiographic imaging, and assessment were all discussed with the patient and/orpatient family. There was shared decision-making between myself as well as the patient and/or their surrogate and we are all in agreement with discharge home. There was an opportunity for questions and all questions were answered tothe best of my ability and to the satisfaction of the patient and/or patient family. MDM  Considered AAA, appendicitis, diverticulitis, pancreatitis, perforated peptic ulcer, perforated viscus, early appendicitis, bowel obstruction, cholecystitis, constipation, gastroenteritis, hepatitis, inflammatory bowel disease, intussesception, ischemic bowel, neoplasm, PUD, renal/ureteral calculi, gonadal torsion, UTI, volvulus. Currently no surgical or severe medical etiology found. Pt. afebrile, toleration PO without difficulty, good pain control, patient able to follow detailed discharge instructions provided. If worsening pain or concerns return to ED. Otherwise follow up with PCP in 12-24 hours for recheck. FINAL IMPRESSION:      1. Ileus ([address]    2. Chronic abdominal pain    3.  Chronic pain of left ankle          DISPOSITION/PLAN:   DISPOSITION Decision To Discharge      PATIENT REFERRED TO:  Juno Cheek MD  [address], [address] [place] [address]    Go to   For follow up    [facility] [address] [phone]  Go to   If symptoms worsen      DISCHARGE MEDICATIONS:  Discharge Medication List as of 12/9/2020  8:41 PM                     (Please note thatportions of this note were completed with a voice recognition program.  Efforts were made to edit the dictations, but occasionally words are mis-transcribed.)    Yanira Stein, APRN - CNP-C (electronicallysigned)          Yanira Stein, CHIQUITA - CNP  12/13/20 1141

## 2020-12-16 NOTE — TELEPHONE ENCOUNTER
I spoke with Diego Massey at facility, scheduled pt for [facility] Insertion for Thursday 12/31/20, advised 10 am arrival time for 12 case, advised NPO, avoid aspirin products, she will be covid tested there, fax number given for results. All instructions reviewed with her, I called and left detailed message for pt as well, for surgery date.

## 2020-12-23 NOTE — PROGRESS NOTES
PRE OP INSTRUCTION SHEET   1. Do not eat or drink anything after 12 midnight  prior to surgery. This includes no water, chewing gum or mints. 2. Take the following pills will a small sip of water (see MAR)                                        3. Aspirin, Ibuprofen, Advil, Naproxen, Vitamin E, fish oil and other Anti-inflammatory products should be stopped for 5 days before surgery or as directed by your physician. 4. Check with your Doctor regarding stopping Plavix, Coumadin, Lovenox, Fragmin or other blood thinners   5. Do not smoke, and do not drink any alcoholic beverages 24 hours prior to surgery. This includes NA Beer. 6. You may brush your teeth and gargle the morning of surgery. DO NOT SWALLOW WATER   7. You MUST make arrangements for a responsible adult to take you home after your surgery. You will not be allowed to leave alone or drive yourself home. It is strongly suggested someone stay with you the first 24 hrs. Your surgery will be cancelled if you do not have a ride home. 8. A parent/legal guardian must accompany a child scheduled for surgery and plan to stay at the hospital until the child is discharged. Please do not bring other children with you. 9. Please wear simple, loose fitting clothing to the hospital.  Verona Hernandez not bring valuables (money, credit cards, checkbooks, etc.) Do not wear any makeup (including no eye makeup) or nail polish on your fingers or toes. 10. DO NOT wear any jewelry or piercings on day of surgery. All body piercing jewelry must be removed. 11. If you have dentures,glasses, or contacts they will be removed before going to the OR; we will provide you a container. 12. Please see your family doctor/and cardiologist for a history & physical and/or concerning medications. Bring any test results/reports from your physician's office. Have history and labs faxed to [phone].  Remember to bring Blood Bank bracelet on the day of surgery. 14. If you have a Living Will and Durable Power of  for Healthcare, please bring in a copy. 13. Notify your Surgeon if you develop any illness between now and surgery  time, cough, cold, fever, sore throat, nausea, vomiting, etc.  Please notify your surgeon if you experience dizziness, shortness of breath or blurred vision between now & the time of your surgery   16. DO NOT shave your operative site 96 hours prior to surgery. For face & neck surgery, men may use an electric razor 48 hours prior to surgery. 17. Shower with _x__Antibacterial soap (x_chlorhexidine for total joint  Pt's) shower two times before surgery.(the morning of and the night before. 18. To provide excellent care visitors will be limited to one in the room at any given time.   Please call pre admission testing if you any further questions [phone] or [phone]

## 2020-12-28 ENCOUNTER — ANESTHESIA EVENT (OUTPATIENT)
Dept: OPERATING ROOM | Age: 60
End: 2020-12-28
Payer: COMMERCIAL

## 2020-12-31 ENCOUNTER — APPOINTMENT (OUTPATIENT)
Dept: GENERAL RADIOLOGY | Age: 60
End: 2020-12-31
Attending: SURGERY
Payer: COMMERCIAL

## 2020-12-31 ENCOUNTER — ANESTHESIA (OUTPATIENT)
Dept: OPERATING ROOM | Age: 60
End: 2020-12-31
Payer: COMMERCIAL

## 2020-12-31 ENCOUNTER — HOSPITAL ENCOUNTER (OUTPATIENT)
Age: 60
Setting detail: OUTPATIENT SURGERY
Discharge: HOME | End: 2020-12-31
Attending: SURGERY | Admitting: SURGERY
Payer: COMMERCIAL

## 2020-12-31 VITALS
SYSTOLIC BLOOD PRESSURE: 162 MMHG | HEART RATE: 63 BPM | HEIGHT: 64 IN | TEMPERATURE: 97.6 F | BODY MASS INDEX: 50.02 KG/M2 | RESPIRATION RATE: 16 BRPM | OXYGEN SATURATION: 97 % | WEIGHT: 293 LBS | DIASTOLIC BLOOD PRESSURE: 64 MMHG

## 2020-12-31 VITALS — DIASTOLIC BLOOD PRESSURE: 58 MMHG | SYSTOLIC BLOOD PRESSURE: 114 MMHG | OXYGEN SATURATION: 100 %

## 2020-12-31 DIAGNOSIS — Z78.9 DIFFICULT INTRAVENOUS ACCESS: Primary | ICD-10-CM

## 2020-12-31 LAB
GLUCOSE BLD-MCNC: 200 MG/DL (ref 70–99)
PERFORMED ON: ABNORMAL

## 2020-12-31 PROCEDURE — 77001 FLUOROGUIDE FOR VEIN DEVICE: CPT

## 2020-12-31 PROCEDURE — 71045 X-RAY EXAM CHEST 1 VIEW: CPT

## 2020-12-31 PROCEDURE — 2580000003 HC RX 258: Performed by: SURGERY

## 2020-12-31 PROCEDURE — C1788 PORT, INDWELLING, IMP: HCPCS | Performed by: SURGERY

## 2020-12-31 PROCEDURE — 6370000000 HC RX 637 (ALT 250 FOR IP): Performed by: ANESTHESIOLOGY

## 2020-12-31 PROCEDURE — 7100000011 HC PHASE II RECOVERY - ADDTL 15 MIN: Performed by: SURGERY

## 2020-12-31 PROCEDURE — 3700000001 HC ADD 15 MINUTES (ANESTHESIA): Performed by: SURGERY

## 2020-12-31 PROCEDURE — 6360000002 HC RX W HCPCS: Performed by: SURGERY

## 2020-12-31 PROCEDURE — 2580000003 HC RX 258: Performed by: ANESTHESIOLOGY

## 2020-12-31 PROCEDURE — 93005 ELECTROCARDIOGRAM TRACING: CPT | Performed by: ANESTHESIOLOGY

## 2020-12-31 PROCEDURE — 2500000003 HC RX 250 WO HCPCS: Performed by: SURGERY

## 2020-12-31 PROCEDURE — 6360000002 HC RX W HCPCS: Performed by: ANESTHESIOLOGY

## 2020-12-31 PROCEDURE — 7100000010 HC PHASE II RECOVERY - FIRST 15 MIN: Performed by: SURGERY

## 2020-12-31 PROCEDURE — 3600000012 HC SURGERY LEVEL 2 ADDTL 15MIN: Performed by: SURGERY

## 2020-12-31 PROCEDURE — 2709999900 HC NON-CHARGEABLE SUPPLY: Performed by: SURGERY

## 2020-12-31 PROCEDURE — 3700000000 HC ANESTHESIA ATTENDED CARE: Performed by: SURGERY

## 2020-12-31 PROCEDURE — 77001 FLUOROGUIDE FOR VEIN DEVICE: CPT | Performed by: SURGERY

## 2020-12-31 PROCEDURE — 76937 US GUIDE VASCULAR ACCESS: CPT | Performed by: SURGERY

## 2020-12-31 PROCEDURE — 6360000002 HC RX W HCPCS: Performed by: NURSE ANESTHETIST, CERTIFIED REGISTERED

## 2020-12-31 PROCEDURE — 36561 INSERT TUNNELED CV CATH: CPT | Performed by: SURGERY

## 2020-12-31 PROCEDURE — 3600000002 HC SURGERY LEVEL 2 BASE: Performed by: SURGERY

## 2020-12-31 DEVICE — PORT INFUS 8FR PWR INJ CT FOR VASC ACCS CATH: Type: IMPLANTABLE DEVICE | Site: CHEST | Status: FUNCTIONAL

## 2020-12-31 RX ORDER — MORPHINE SULFATE 10 MG/ML
2 INJECTION, SOLUTION INTRAMUSCULAR; INTRAVENOUS EVERY 5 MIN PRN
Status: DISCONTINUED | OUTPATIENT
Start: 2020-12-31 | End: 2020-12-31 | Stop reason: HOSPADM

## 2020-12-31 RX ORDER — SODIUM CHLORIDE 0.9 % (FLUSH) 0.9 %
10 SYRINGE (ML) INJECTION PRN
Status: DISCONTINUED | OUTPATIENT
Start: 2020-12-31 | End: 2020-12-31 | Stop reason: HOSPADM

## 2020-12-31 RX ORDER — HEPARIN SODIUM (PORCINE) LOCK FLUSH IV SOLN 100 UNIT/ML 100 UNIT/ML
SOLUTION INTRAVENOUS PRN
Status: DISCONTINUED | OUTPATIENT
Start: 2020-12-31 | End: 2020-12-31 | Stop reason: ALTCHOICE

## 2020-12-31 RX ORDER — SODIUM CHLORIDE, SODIUM LACTATE, POTASSIUM CHLORIDE, CALCIUM CHLORIDE 600; 310; 30; 20 MG/100ML; MG/100ML; MG/100ML; MG/100ML
INJECTION, SOLUTION INTRAVENOUS CONTINUOUS
Status: DISCONTINUED | OUTPATIENT
Start: 2020-12-31 | End: 2020-12-31 | Stop reason: HOSPADM

## 2020-12-31 RX ORDER — SODIUM CHLORIDE 0.9 % (FLUSH) 0.9 %
10 SYRINGE (ML) INJECTION EVERY 12 HOURS SCHEDULED
Status: DISCONTINUED | OUTPATIENT
Start: 2020-12-31 | End: 2020-12-31 | Stop reason: HOSPADM

## 2020-12-31 RX ORDER — OXYCODONE HYDROCHLORIDE AND ACETAMINOPHEN 5; 325 MG/1; MG/1
2 TABLET ORAL PRN
Status: COMPLETED | OUTPATIENT
Start: 2020-12-31 | End: 2020-12-31

## 2020-12-31 RX ORDER — FENTANYL CITRATE 50 UG/ML
INJECTION, SOLUTION INTRAMUSCULAR; INTRAVENOUS PRN
Status: DISCONTINUED | OUTPATIENT
Start: 2020-12-31 | End: 2020-12-31 | Stop reason: SDUPTHER

## 2020-12-31 RX ORDER — PROPOFOL 10 MG/ML
INJECTION, EMULSION INTRAVENOUS PRN
Status: DISCONTINUED | OUTPATIENT
Start: 2020-12-31 | End: 2020-12-31 | Stop reason: SDUPTHER

## 2020-12-31 RX ORDER — DEXAMETHASONE SODIUM PHOSPHATE 4 MG/ML
INJECTION, SOLUTION INTRA-ARTICULAR; INTRALESIONAL; INTRAMUSCULAR; INTRAVENOUS; SOFT TISSUE PRN
Status: DISCONTINUED | OUTPATIENT
Start: 2020-12-31 | End: 2020-12-31 | Stop reason: SDUPTHER

## 2020-12-31 RX ORDER — OXYCODONE HYDROCHLORIDE AND ACETAMINOPHEN 5; 325 MG/1; MG/1
1 TABLET ORAL PRN
Status: COMPLETED | OUTPATIENT
Start: 2020-12-31 | End: 2020-12-31

## 2020-12-31 RX ORDER — ONDANSETRON 2 MG/ML
4 INJECTION INTRAMUSCULAR; INTRAVENOUS ONCE
Status: COMPLETED | OUTPATIENT
Start: 2020-12-31 | End: 2020-12-31

## 2020-12-31 RX ORDER — MORPHINE SULFATE 10 MG/ML
1 INJECTION, SOLUTION INTRAMUSCULAR; INTRAVENOUS EVERY 5 MIN PRN
Status: DISCONTINUED | OUTPATIENT
Start: 2020-12-31 | End: 2020-12-31 | Stop reason: HOSPADM

## 2020-12-31 RX ORDER — ONDANSETRON 2 MG/ML
4 INJECTION INTRAMUSCULAR; INTRAVENOUS
Status: DISCONTINUED | OUTPATIENT
Start: 2020-12-31 | End: 2020-12-31 | Stop reason: HOSPADM

## 2020-12-31 RX ORDER — MIDAZOLAM HYDROCHLORIDE 1 MG/ML
INJECTION INTRAMUSCULAR; INTRAVENOUS PRN
Status: DISCONTINUED | OUTPATIENT
Start: 2020-12-31 | End: 2020-12-31 | Stop reason: SDUPTHER

## 2020-12-31 RX ORDER — OXYCODONE HYDROCHLORIDE AND ACETAMINOPHEN 5; 325 MG/1; MG/1
1 TABLET ORAL EVERY 6 HOURS PRN
Qty: 20 TABLET | Refills: 0 | Status: SHIPPED | OUTPATIENT
Start: 2020-12-31 | End: 2021-01-05

## 2020-12-31 RX ORDER — MEPERIDINE HYDROCHLORIDE 25 MG/ML
12.5 INJECTION INTRAMUSCULAR; INTRAVENOUS; SUBCUTANEOUS EVERY 5 MIN PRN
Status: DISCONTINUED | OUTPATIENT
Start: 2020-12-31 | End: 2020-12-31 | Stop reason: HOSPADM

## 2020-12-31 RX ADMIN — MIDAZOLAM 2 MG: 1 INJECTION INTRAMUSCULAR; INTRAVENOUS at 11:30

## 2020-12-31 RX ADMIN — PROPOFOL 450 MG: 10 INJECTION, EMULSION INTRAVENOUS at 11:42

## 2020-12-31 RX ADMIN — ONDANSETRON HYDROCHLORIDE 4 MG: 2 INJECTION, SOLUTION INTRAMUSCULAR; INTRAVENOUS at 10:50

## 2020-12-31 RX ADMIN — SODIUM CHLORIDE, POTASSIUM CHLORIDE, SODIUM LACTATE AND CALCIUM CHLORIDE: 600; 310; 30; 20 INJECTION, SOLUTION INTRAVENOUS at 10:39

## 2020-12-31 RX ADMIN — Medication 3 G: at 11:33

## 2020-12-31 RX ADMIN — OXYCODONE HYDROCHLORIDE AND ACETAMINOPHEN 2 TABLET: 5; 325 TABLET ORAL at 12:42

## 2020-12-31 RX ADMIN — FENTANYL CITRATE 100 MCG: 50 INJECTION INTRAMUSCULAR; INTRAVENOUS at 11:33

## 2020-12-31 RX ADMIN — SODIUM CHLORIDE, POTASSIUM CHLORIDE, SODIUM LACTATE AND CALCIUM CHLORIDE: 600; 310; 30; 20 INJECTION, SOLUTION INTRAVENOUS at 11:06

## 2020-12-31 RX ADMIN — DEXAMETHASONE SODIUM PHOSPHATE 8 MG: 4 INJECTION, SOLUTION INTRAMUSCULAR; INTRAVENOUS at 11:49

## 2020-12-31 RX ADMIN — MIDAZOLAM 2 MG: 1 INJECTION INTRAMUSCULAR; INTRAVENOUS at 11:33

## 2020-12-31 ASSESSMENT — PULMONARY FUNCTION TESTS
PIF_VALUE: 1

## 2020-12-31 ASSESSMENT — PAIN SCALES - GENERAL: PAINLEVEL_OUTOF10: 10

## 2020-12-31 ASSESSMENT — PAIN DESCRIPTION - ORIENTATION: ORIENTATION: ANTERIOR;UPPER

## 2020-12-31 ASSESSMENT — PAIN - FUNCTIONAL ASSESSMENT: PAIN_FUNCTIONAL_ASSESSMENT: 0-10

## 2020-12-31 NOTE — H&P
I have reviewed the progress note of Dr. Tarun Bray serving as history and physical dated December/23/ 2020 (Scanned into media tab 12/30/2020) and examined the patient and find no relevant changes. I have reviewed with the patient and/or family the risks, benefits, and alternatives to the procedure.

## 2020-12-31 NOTE — BRIEF OP NOTE
Brief Postoperative Note      Patient: Josefina Jesus  YOB: 1960  MRN: [id number]    Date of Procedure: 12/31/2020    Pre-Op Diagnosis: POOR VENOUS ACCESS    Post-Op Diagnosis: Same       Procedure(s):  PORT INSERTION    Surgeon(s):  Kathrin Mcclelland MD    Assistant:  Surgical Assistant: Kelsy Silverman    Anesthesia: Monitor Anesthesia Care    Estimated Blood Loss (mL): less than 50     Complications: None    Specimens:   * No specimens in log *    Implants:  Implant Name Type Inv.  Item Serial No.  Lot No. LRB No. Used Action   PORT INFUS 8FR PWR INJ CT FOR VASC ACCS CATH  PORT INFUS 8FR PWR INJ CT FOR VASC ACCS CATH  [facility]- [id number] Left 1 Implanted         Drains: * No LDAs found *    Findings: as above    Electronically signed by Lupillo Vigil MD on 12/31/2020 at 12:13 PM

## 2020-12-31 NOTE — PROGRESS NOTES
Patient and RN discussed use of tegaderm for postop dressing for the port prior to entering the OR. Patient stated that tegaderm would be ok to use, even though it is listed as an allergy.

## 2020-12-31 NOTE — ANESTHESIA PRE PROCEDURE
Department of Anesthesiology  Preprocedure Note       Name:  Brian Zepeda   Age:  63 y.o.  :  1960                                          MRN:  [id number]         Date:  2020      Surgeon: Kaiden Ludwig):  Amanda Portillo MD    Procedure: Procedure(s):  PORT INSERTION    Medications prior to admission:   Prior to Admission medications    Medication Sig Start Date End Date Taking? Authorizing Provider   levoFLOXacin (LEVAQUIN) 500 MG tablet Take 500 mg by mouth daily   Yes Historical Provider, MD   sodium bicarbonate 325 MG tablet Take 650 mg by mouth 2 times daily   Yes Historical Provider, MD   medroxyPROGESTERone (PROVERA) 10 MG tablet TAKE ONE TABLET BY MOUTH DAILY 20  Yes Historical Provider, MD   ondansetron (ZOFRAN ODT) 4 MG disintegrating tablet Take 1-2 tablets by mouth every 12 hours as needed for Nausea May Sub regular tablet (non-ODT) if insurance does not cover ODT. 20  Yes CHIQUITA Eric CNP   insulin glargine (BASAGLAR KWIKPEN) 100 UNIT/ML injection pen Inject 28 Units into the skin 2 times daily Before breakfast and dinner 20  Yes Jennifer Mcwilliams MD   insulin glargine (BASAGLAR KWIKPEN) 100 UNIT/ML injection pen Inject into the skin nightly   Yes Historical Provider, MD   methocarbamol (ROBAXIN) 500 MG tablet TAKE ONE TABLET BY MOUTH TWICE A DAY 19  Yes Taisha Osborne MD   diazepam (VALIUM) 10 MG tablet take 1 tablet by oral route  every bedtime   Yes Historical Provider, MD   atorvastatin (LIPITOR) 40 MG tablet Take 40 mg by mouth   Yes Historical Provider, MD   Misc. Devices [facility]) MISC 1 each by Does not apply route daily 10/9/17  Yes JAKE Gardner   insulin aspart (NOVOLOG FLEXPEN) 100 UNIT/ML injection pen Continue with your current aspart insulin regimen (fairly complex aspart system which is a combination of SSI, carb counting, scheduled post-meal insulin, and adjustments based on her level of nausea at the time - about 40u/day). Patient taking differently: Continue with your current aspart insulin regimen (fairly complex aspart system which is a combination of SSI, carb counting, scheduled post-meal insulin, and adjustments based on her level of nausea at the time - about 40u/day). 1 unit per 8 carbs and 1 unit for every 12 points but pt is unsure what her \"high\" number is. 7/17/17  Yes Tennille Jack MD   timolol (BETIMOL) 0.25 % ophthalmic solution 1-2 drops 2 times daily   Yes Historical Provider, MD   Cholecalciferol (VITAMIN D3) 2000 UNITS CAPS Take  by mouth daily. Yes Historical Provider, MD   Cyanocobalamin (VITAMIN B 12 PO) Take 1 tablet by mouth daily. Yes Historical Provider, MD   L-Lysine 500 MG CAPS Take  by mouth 4 times daily as needed. Yes Historical Provider, MD   sertraline (ZOLOFT) 100 MG tablet Take 200 mg by mouth nightly. Yes Historical Provider, MD   lactulose (CHRONULAC) 10 GM/15ML solution Take 20 g by mouth daily. Yes Historical Provider, MD   ketorolac (ACULAR LS) 0.4 % SOLN ophthalmic solution 1 drop daily. 2/27/11  Yes Historical Provider, MD   olopatadine (PATANOL) 0.1 % ophthalmic solution 1 drop 2 times daily. Yes Historical Provider, MD   medroxyPROGESTERone (PROVERA) 2.5 MG tablet Take 10 mg by mouth daily. Yes Historical Provider, MD   montelukast (SINGULAIR) 10 MG tablet Take 10 mg by mouth nightly.    Yes Historical Provider, MD       Current medications:    Current Facility-Administered Medications   Medication Dose Route Frequency Provider Last Rate Last Admin    lactated ringers infusion   Intravenous Continuous Arsenio Andujar MD        sodium chloride flush 0.9 % injection 10 mL  10 mL Intravenous 2 times per day Arsenio Andujar MD        sodium chloride flush 0.9 % injection 10 mL  10 mL Intravenous PRN Arsenio Andujar MD        famotidine (PEPCID) injection 20 mg  20 mg Intravenous Once Arsenio Andujar MD  ceFAZolin (ANCEF) 3 g in dextrose 5 % 100 mL IVPB  3 g Intravenous Once Farheen Shetty MD           Allergies: Allergies   Allergen Reactions    Adhesive Tape Dermatitis    Ambien [Zolpidem]      Made me wild    Codeine      Can take a little just not a lot at a time    Flurosyn [Fluocinolone Acetonide]      \"flurosceen eye dye\" severe HA, fainting    Morphine      Went bezerk    Tegaderm Ag Mesh [Silver] Dermatitis     All types of tegaderm and adhesives       Problem List:    Patient Active Problem List   Diagnosis Code    Diabetes mellitus ([facility] E11.9    Essential hypertension I10    Vomiting R11.10    Morbid obesity due to excess calories ([facility]) E66.01    Chronic pain associated with significant psychosocial dysfunction G89.4    SBO (small bowel obstruction) ([facility]) G18.617    Metabolic encephalopathy A65.27    Chronic kidney disease, stage 3, mod decreased GFR ([facility]) N18.30    HLD (hyperlipidemia) E78.5    Contusion of toe, right S90.121A    Degenerative arthritis of thumb M18.10    Tendinitis of left wrist M77.8    Hand pain M79.643    Heel pain, chronic M79.673, G89.29    Pain in joint, lower leg M25.569    Knee joint replacement status Z96.659    Contusion of knee S80.00XA    Plantar fasciitis, bilateral M72.2    Bilateral chronic knee pain M25.561, M25.562, G89.29    Back pain M54.9    Lumbar sprain S33. 5XXA    Knee MCL sprain S83.419A    DDD (degenerative disc disease), lumbar M51.36    Acute pain of left shoulder M25.512    Right wrist pain M25.531    Right wrist sprain S63.501A    Rotator cuff strain S46.019A    Contusion of left shoulder S40.012A    Pain in scapula M89.8X1    Acute pain of right shoulder M25.511    DKA (diabetic ketoacidoses) ([facility]) E11.10    THEODORE (acute kidney injury) ([facility] N17.9    NSTEMI (non-ST elevated myocardial infarction) ([facility] I21.4    Diabetic ketoacidosis with coma ([facility]) E11.11    Non-traumatic rhabdomyolysis M62.82  Cervical pain M54.2    Closed avulsion fracture of lateral malleolus of left fibula S82. 62XA    Pain of right thumb M79.644    Acute left ankle pain M25.572    Gamekeeper's thumb of right hand S53. 31XA    Frequent falls R29.6   [facility] or reservoir infection T80.212A       Past Medical History:        Diagnosis Date    Acid reflux     Acute renal failure (HCC) 2011    resolved    Anemia     Arthritis     causing severe lower back pains    Asthma     Bronchitis     Bursitis     all joints    C. difficile colitis     Diabetes mellitus ([address]     age 4   [facility] Herpes     5    History of blood transfusion     Hypercholesteremia     Hypertension     Migraine     Neuropathy     hips to legs    Obesity     Psychiatric problem     anxiety and depression    Recurrent sinus infections     Renal failure     in past with 8 dialysis treatments    Retinopathy of both eyes     Sleep apnea     can't use a cpap    Ulcer, stomach peptic     x 3    Unspecified cerebral artery occlusion with cerebral infarction     \"silent stroke\"    Unspecified diseases of blood and blood-forming organs     bled for 1 year straight at age 4       Past Surgical History:        Procedure Laterality Date    ABDOMEN SURGERY      ABDOMINAL EXPLORATION SURGERY  13    EXPLORATORY LAPAROTOMY LYSIS OF ADHESIONS    APPENDECTOMY      CARPAL TUNNEL RELEASE      both hands    CATARACT REMOVAL WITH IMPLANT  11    right    CATARACT REMOVAL WITH IMPLANT  2011    left eye     SECTION      COLONOSCOPY      DILATATION, ESOPHAGUS      ENDOSCOPY, COLON, DIAGNOSTIC      EYE SURGERY      laser, vitrectomy ou, cataract ou    FRACTURE SURGERY      HYSTERECTOMY      partial    JOINT REPLACEMENT      both knees    OTHER SURGICAL HISTORY  8/15/11    Left Shoulder Decompression    OTHER SURGICAL HISTORY      PORT REMOVAL (N/A Chest    PORT SURGERY N/A 12/3/2020 PORT REM[facility]AL [facility]      right     SUBTOTAL COLECTOMY  6/14/2000    TONSILLECTOMY      TUNNELED VENOUS PORT PLACEMENT  8/12/11    TUNNELED VENOUS PORT PLACEMENT  3/1/13    Devonte Bustillos       Social History:    Social History     Tobacco Use    Smoking status: Never Smoker    Smokeless tobacco: Never Used   Substance Use Topics    Alcohol use: No                                Counseling given: Not Answered      Vital Signs (Current):   Vitals:    12/23/20 1333 12/31/20 1008   BP:  132/64   Pulse:  60   Resp:  18   Temp:  97.3 °F (36.3 °C)   TempSrc:  Temporal   SpO2:  99%   Weight: (!) 369 lb (167.4 kg)    Height: 5' 4\" (1.626 m)                                               BP Readings from Last 3 Encounters:   12/31/20 132/64   12/10/20 (!) 121/52   12/03/20 (!) 173/71       NPO Status:  MN+, see mar                                                                               BMI:   Wt Readings from Last 3 Encounters:   12/23/20 (!) 369 lb (167.4 kg)   12/09/20 (!) 367 lb (166.5 kg)   11/30/20 (!) 367 lb (166.5 kg)     Body mass index is 63.34 kg/m².     CBC:   Lab Results   Component Value Date    WBC 7.3 12/09/2020    RBC 3.82 12/09/2020    HGB 12.2 12/09/2020    HCT 36.8 12/09/2020    MCV 96.2 12/09/2020    RDW 13.2 12/09/2020     12/09/2020       CMP:   Lab Results   Component Value Date     12/09/2020    K 4.0 12/09/2020    K 3.6 08/14/2020    CL 97 12/09/2020    CO2 26 12/09/2020    BUN 10 12/09/2020    CREATININE 1.1 12/09/2020    GFRAA >60 12/09/2020    GFRAA >60 03/22/2013    AGRATIO 1.3 12/09/2020    LABGLOM 50 12/09/2020    GLUCOSE 89 12/10/2020    PROT 6.8 12/09/2020    PROT 5.0 01/27/2013    CALCIUM 9.8 12/09/2020    BILITOT 0.3 12/09/2020    ALKPHOS 74 12/09/2020    AST 20 12/09/2020    ALT 8 12/09/2020 POC Tests: No results for input(s): POCGLU, POCNA, POCK, POCCL, POCBUN, POCHEMO, POCHCT in the last 72 hours. Coags:   Lab Results   Component Value Date    PROTIME 12.4 07/04/2017    INR 1.10 07/04/2017    APTT 40.9 07/07/2017       HCG (If Applicable): No results found for: PREGTESTUR, PREGSERUM, HCG, HCGQUANT     ABGs:   Lab Results   Component Value Date    PHART 7.202 07/04/2017    PO2ART 170.6 07/04/2017    PDG2PZF 27.3 07/04/2017    HEV7OSG 10.5 07/04/2017    BEART -16.1 07/04/2017    H9IABYEN 98.4 07/04/2017        Type & Screen (If Applicable):  Lab Results   Component Value Date    LABABO A 02/10/2011    LABRH Negative 02/10/2011       Drug/Infectious Status (If Applicable):  Lab Results   Component Value Date    HEPCAB Non-Reactive (Negative) 04/15/2011       COVID-19 Screening (If Applicable):   Lab Results   Component Value Date    COVID19 Not Detected 08/11/2020         Anesthesia Evaluation  Patient summary reviewed no history of anesthetic complications:   Airway: Mallampati: II  TM distance: <3 FB   Neck ROM: limited  Mouth opening: > = 3 FB Dental:          Pulmonary: breath sounds clear to auscultation  (+) shortness of breath (EXERT):  sleep apnea: on CPAP and noncompliant,  asthma (NO INHALERS OR O2 REQ.):     (-) COPD and not a current smoker          Patient did not smoke on day of surgery. Cardiovascular:    (+) hypertension:, HATHAWAY:,     (-) pacemaker, past MI, CAD, CABG/stent, dysrhythmias,  angina and  CHF    ECG reviewed  Rhythm: regular  Rate: normal           Beta Blocker:  Not on Beta Blocker         Neuro/Psych:   (+) neuromuscular disease (NEUROPATHY, HANDS AND FEET // LBP):, headaches: migraine headaches, psychiatric history:   (-) seizures, TIA and CVA           GI/Hepatic/Renal:   (+) GERD:, PUD, renal disease: ESRD, morbid obesity     (-) bowel prep       Endo/Other:    (+) DiabetesType II DM, using insulin, : arthritis:., no malignancy/cancer. (-) hypothyroidism, hyperthyroidism, no malignancy/cancer               Abdominal:   (+) obese,     Abdomen: soft. Vascular: negative vascular ROS. Anesthesia Plan      TIVA     ASA 3     (DNR!!!, NO ETT, IV RESUS, CP, DEFIB)  Induction: intravenous. MIPS: Prophylactic antiemetics administered. Anesthetic plan and risks discussed with patient. Plan discussed with CRNA. This pre-anesthesia assessment may be used as a history and physical.    DOS STAFF ADDENDUM:    Pt seen and examined, chart reviewed (including anesthesia, drug and allergy history). No interval changes to history and physical examination. Anesthetic plan, risks, benefits, alternatives, and personnel involved discussed with patient. Patient verbalized an understanding and agrees to proceed.       Citlalli Adames MD  December 31, 2020  10:25 AM          Citlalli Adames MD   12/31/2020

## 2020-12-31 NOTE — ANESTHESIA POSTPROCEDURE EVALUATION
Department of Anesthesiology  Postprocedure Note    Patient: Nils Love  MRN: [id number]  YOB: 1960  Date of evaluation: 12/31/2020  Time:  1:00 PM     Procedure Summary     Date: 12/31/20 Room / Location: [facility]    Anesthesia Start: 1513 Anesthesia Stop: 5067    Procedure: PORT INSERTION (Left Chest) Diagnosis: (POOR VENOUS ACCESS)    Surgeons: Jennifer Rea MD Responsible Provider: Maite Plascencia MD    Anesthesia Type: TIVA ASA Status: 3          Anesthesia Type: TIVA    Pam Phase I: Pam Score: 10    Pam Phase II:      Last vitals: Reviewed and per EMR flowsheets.    Vitals:    12/23/20 1333 12/31/20 1008   BP:  132/64   Pulse:  60   Resp:  18   Temp:  97.3 °F (36.3 °C)   TempSrc:  Temporal   SpO2:  99%   Weight: (!) 369 lb (167.4 kg)    Height: 5' 4\" (1.626 m)        Anesthesia Post Evaluation    Patient location during evaluation: bedside  Patient participation: complete - patient participated  Level of consciousness: awake and alert  Airway patency: patent  Nausea & Vomiting: no nausea  Complications: no  Cardiovascular status: hemodynamically stable  Hydration status: euvolemic

## 2021-01-01 ENCOUNTER — CLINICAL DOCUMENTATION (OUTPATIENT)
Dept: OTHER | Age: 61
End: 2021-01-01

## 2021-01-01 LAB
EKG ATRIAL RATE: 56 BPM
EKG DIAGNOSIS: NORMAL
EKG P AXIS: 50 DEGREES
EKG P-R INTERVAL: 164 MS
EKG Q-T INTERVAL: 442 MS
EKG QRS DURATION: 94 MS
EKG QTC CALCULATION (BAZETT): 426 MS
EKG R AXIS: -17 DEGREES
EKG T AXIS: -2 DEGREES
EKG VENTRICULAR RATE: 56 BPM

## 2021-01-01 NOTE — OP NOTE
[address]                 [address] [address]                                OPERATIVE REPORT    PATIENT NAME: Betty Barajas                   :        1960  MED REC NO:   [id number]                          ROOM:  ACCOUNT NO:   [de-identified]                           ADMIT DATE: 2020  PROVIDER:     Sarah Aguiar. MD Yaw    DATE OF PROCEDURE:  2020    PREOPERATIVE DIAGNOSIS:  Difficult intravenous access. POSTOPERATIVE DIAGNOSIS:  Difficult intravenous access. PROCEDURE:  Port-A-Cath placement with surgeon's use of fluoroscopy and  ultrasound. ANESTHESIA:  Local with MAC. SURGEON:  Venecia Tidwell MD    ESTIMATED BLOOD LOSS:  Less than 50 mL. INDICATIONS:  The patient is a 65-year-old woman with multiple medical  issues and difficult intravenous access. She had a recent port  infection. This had to be removed. She now presents for definitive  intravenous replacement. OPERATIVE SUMMARY:  After preoperative evaluation, the patient was  brought in the operating suite and placed in a comfortable supine  position on the operating room table. Monitoring equipment was  attached, and she was given intravenous sedation per Anesthesia. She  was placed in Trendelenburg position, and her neck and shoulders were  sterilely prepped and draped and her left subclavian region was  anesthetized with local anesthetic. I was unable to access the left  subclavian vein, therefore, the ultrasound was obtained and the left  internal jugular vein was identified and accessed. A wire was passed  and fluoro showed this to be in good position. A small skin incision  was created around the wire and a subcutaneous pocket was created  inferior to the subclavian incision.   The sheath and dilator were then  passed over the wire and the neck, and the wire and dilator were

## 2021-02-09 ENCOUNTER — TELEPHONE (OUTPATIENT)
Dept: SURGERY | Age: 61
End: 2021-02-09

## 2021-02-09 NOTE — TELEPHONE ENCOUNTER
Pt resides in nursing facility, she had colon surgery in 2013- she is calling today to let you know that she has been having diarrhea, no matter what she eats or drinks, I advised she may need to talk to a GI specialist, and I wasn't sure that we would be able to do anything from a surgical standpoint, she had tried multiple meds but they upset her stomach. She wants your opinion, please advise.

## 2021-02-11 NOTE — TELEPHONE ENCOUNTER
Left detailed message needs to follow up with GI specialists for the stomach issues she has been having. Pt requested for me to leave her a message if she didn't answer. I advised to call the office back if she has any further questions or concerns.

## 2021-02-11 NOTE — TELEPHONE ENCOUNTER
2013 was just a lysis of adhesions. her colon surgery was 2000.  Sounds more like an issue for GI than us

## 2021-03-16 ENCOUNTER — ANESTHESIA EVENT (OUTPATIENT)
Dept: ENDOSCOPY | Age: 61
End: 2021-03-16
Payer: COMMERCIAL

## 2021-03-17 ENCOUNTER — ANESTHESIA (OUTPATIENT)
Dept: ENDOSCOPY | Age: 61
End: 2021-03-17
Payer: COMMERCIAL

## 2021-03-17 ENCOUNTER — HOSPITAL ENCOUNTER (OUTPATIENT)
Age: 61
Setting detail: OUTPATIENT SURGERY
Discharge: NURSING, REHAB OR LONG-TERM CARE | End: 2021-03-17
Attending: INTERNAL MEDICINE | Admitting: INTERNAL MEDICINE
Payer: COMMERCIAL

## 2021-03-17 VITALS
BODY MASS INDEX: 50.02 KG/M2 | DIASTOLIC BLOOD PRESSURE: 55 MMHG | OXYGEN SATURATION: 97 % | WEIGHT: 293 LBS | HEIGHT: 64 IN | RESPIRATION RATE: 16 BRPM | HEART RATE: 88 BPM | TEMPERATURE: 96.8 F | SYSTOLIC BLOOD PRESSURE: 149 MMHG

## 2021-03-17 VITALS
OXYGEN SATURATION: 100 % | RESPIRATION RATE: 11 BRPM | SYSTOLIC BLOOD PRESSURE: 161 MMHG | DIASTOLIC BLOOD PRESSURE: 63 MMHG

## 2021-03-17 LAB
GLUCOSE BLD-MCNC: 302 MG/DL (ref 70–99)
PERFORMED ON: ABNORMAL

## 2021-03-17 PROCEDURE — 7100000010 HC PHASE II RECOVERY - FIRST 15 MIN: Performed by: INTERNAL MEDICINE

## 2021-03-17 PROCEDURE — 6360000002 HC RX W HCPCS: Performed by: NURSE ANESTHETIST, CERTIFIED REGISTERED

## 2021-03-17 PROCEDURE — 3700000001 HC ADD 15 MINUTES (ANESTHESIA): Performed by: INTERNAL MEDICINE

## 2021-03-17 PROCEDURE — 6370000000 HC RX 637 (ALT 250 FOR IP): Performed by: ANESTHESIOLOGY

## 2021-03-17 PROCEDURE — 2580000003 HC RX 258: Performed by: INTERNAL MEDICINE

## 2021-03-17 PROCEDURE — 3700000000 HC ANESTHESIA ATTENDED CARE: Performed by: INTERNAL MEDICINE

## 2021-03-17 PROCEDURE — 2709999900 HC NON-CHARGEABLE SUPPLY: Performed by: INTERNAL MEDICINE

## 2021-03-17 PROCEDURE — 3609027000 HC COLONOSCOPY: Performed by: INTERNAL MEDICINE

## 2021-03-17 PROCEDURE — 7100000011 HC PHASE II RECOVERY - ADDTL 15 MIN: Performed by: INTERNAL MEDICINE

## 2021-03-17 RX ORDER — DICYCLOMINE HYDROCHLORIDE 10 MG/1
10 CAPSULE ORAL EVERY 8 HOURS PRN
COMMUNITY

## 2021-03-17 RX ORDER — KETOROLAC TROMETHAMINE 5 MG/ML
1 SOLUTION OPHTHALMIC DAILY
COMMUNITY

## 2021-03-17 RX ORDER — MEPERIDINE HYDROCHLORIDE 25 MG/ML
12.5 INJECTION INTRAMUSCULAR; INTRAVENOUS; SUBCUTANEOUS EVERY 5 MIN PRN
Status: DISCONTINUED | OUTPATIENT
Start: 2021-03-17 | End: 2021-03-17 | Stop reason: HOSPADM

## 2021-03-17 RX ORDER — SODIUM CHLORIDE 0.9 % (FLUSH) 0.9 %
SYRINGE (ML) INJECTION
Status: DISCONTINUED
Start: 2021-03-17 | End: 2021-03-17 | Stop reason: HOSPADM

## 2021-03-17 RX ORDER — SODIUM CHLORIDE, SODIUM LACTATE, POTASSIUM CHLORIDE, CALCIUM CHLORIDE 600; 310; 30; 20 MG/100ML; MG/100ML; MG/100ML; MG/100ML
INJECTION, SOLUTION INTRAVENOUS CONTINUOUS
Status: DISCONTINUED | OUTPATIENT
Start: 2021-03-17 | End: 2021-03-17 | Stop reason: HOSPADM

## 2021-03-17 RX ORDER — TIZANIDINE 4 MG/1
4 TABLET ORAL EVERY 6 HOURS PRN
COMMUNITY

## 2021-03-17 RX ORDER — PROMETHAZINE HYDROCHLORIDE 25 MG/ML
6.25 INJECTION, SOLUTION INTRAMUSCULAR; INTRAVENOUS
Status: DISCONTINUED | OUTPATIENT
Start: 2021-03-17 | End: 2021-03-17 | Stop reason: HOSPADM

## 2021-03-17 RX ORDER — ONDANSETRON 2 MG/ML
4 INJECTION INTRAMUSCULAR; INTRAVENOUS
Status: DISCONTINUED | OUTPATIENT
Start: 2021-03-17 | End: 2021-03-17 | Stop reason: HOSPADM

## 2021-03-17 RX ORDER — LABETALOL HYDROCHLORIDE 5 MG/ML
5 INJECTION, SOLUTION INTRAVENOUS EVERY 10 MIN PRN
Status: DISCONTINUED | OUTPATIENT
Start: 2021-03-17 | End: 2021-03-17 | Stop reason: HOSPADM

## 2021-03-17 RX ORDER — PROPOFOL 10 MG/ML
INJECTION, EMULSION INTRAVENOUS PRN
Status: DISCONTINUED | OUTPATIENT
Start: 2021-03-17 | End: 2021-03-17 | Stop reason: SDUPTHER

## 2021-03-17 RX ORDER — HYDRALAZINE HYDROCHLORIDE 20 MG/ML
5 INJECTION INTRAMUSCULAR; INTRAVENOUS EVERY 10 MIN PRN
Status: DISCONTINUED | OUTPATIENT
Start: 2021-03-17 | End: 2021-03-17 | Stop reason: HOSPADM

## 2021-03-17 RX ORDER — CALCIUM POLYCARBOPHIL 625 MG 625 MG/1
625 TABLET ORAL DAILY
COMMUNITY

## 2021-03-17 RX ORDER — DIPHENHYDRAMINE HYDROCHLORIDE 50 MG/ML
12.5 INJECTION INTRAMUSCULAR; INTRAVENOUS
Status: DISCONTINUED | OUTPATIENT
Start: 2021-03-17 | End: 2021-03-17 | Stop reason: HOSPADM

## 2021-03-17 RX ORDER — INSULIN ASPART 100 [IU]/ML
1 INJECTION, SOLUTION INTRAVENOUS; SUBCUTANEOUS PRN
COMMUNITY

## 2021-03-17 RX ORDER — OXYCODONE HYDROCHLORIDE AND ACETAMINOPHEN 5; 325 MG/1; MG/1
2 TABLET ORAL PRN
Status: DISCONTINUED | OUTPATIENT
Start: 2021-03-17 | End: 2021-03-17 | Stop reason: HOSPADM

## 2021-03-17 RX ORDER — ONDANSETRON 2 MG/ML
INJECTION INTRAMUSCULAR; INTRAVENOUS PRN
Status: DISCONTINUED | OUTPATIENT
Start: 2021-03-17 | End: 2021-03-17 | Stop reason: SDUPTHER

## 2021-03-17 RX ORDER — KETOTIFEN FUMARATE 0.35 MG/ML
1 SOLUTION/ DROPS OPHTHALMIC 2 TIMES DAILY
COMMUNITY

## 2021-03-17 RX ORDER — OXYCODONE HYDROCHLORIDE AND ACETAMINOPHEN 5; 325 MG/1; MG/1
1 TABLET ORAL PRN
Status: DISCONTINUED | OUTPATIENT
Start: 2021-03-17 | End: 2021-03-17 | Stop reason: HOSPADM

## 2021-03-17 RX ORDER — GABAPENTIN 100 MG/1
200 CAPSULE ORAL 2 TIMES DAILY
COMMUNITY

## 2021-03-17 RX ADMIN — INSULIN LISPRO 4 UNITS: 100 INJECTION, SOLUTION INTRAVENOUS; SUBCUTANEOUS at 10:49

## 2021-03-17 RX ADMIN — SODIUM CHLORIDE, POTASSIUM CHLORIDE, SODIUM LACTATE AND CALCIUM CHLORIDE: 600; 310; 30; 20 INJECTION, SOLUTION INTRAVENOUS at 09:00

## 2021-03-17 RX ADMIN — PROPOFOL 400 MG: 10 INJECTION, EMULSION INTRAVENOUS at 09:07

## 2021-03-17 RX ADMIN — ONDANSETRON 4 MG: 2 INJECTION, SOLUTION INTRAMUSCULAR; INTRAVENOUS at 09:07

## 2021-03-17 ASSESSMENT — PAIN DESCRIPTION - DESCRIPTORS: DESCRIPTORS: ACHING

## 2021-03-17 ASSESSMENT — PAIN - FUNCTIONAL ASSESSMENT: PAIN_FUNCTIONAL_ASSESSMENT: 0-10

## 2021-03-17 NOTE — H&P
History and Physical / Pre-Sedation Assessment    Patient:  Lawanda Sharpe   :   1960     Intended Procedure:  Colonoscopy    HPI: 56-year-old female with history of diabetes, hypertension, hyperlipidemia, chronic abdominal pain, colonic inertia status post subtotal colectomy, small bowel obstruction, and chronic constipation, present for screening colonoscopy    Past Medical History:   Diagnosis Date    Acid reflux     Acute renal failure ([facility] [address] 2011    resolved    Anemia     Arthritis     causing severe lower back pains    Asthma     Bronchitis     Bursitis     all joints    C. difficile colitis     Diabetes mellitus ([facility] [address]     age 4   [facility] Herpes     5    History of blood transfusion     Hypercholesteremia     Hypertension     Migraine     Neuropathy     hips to legs    Obesity     Psychiatric problem     anxiety and depression    Recurrent sinus infections     Renal failure     in past with 8 dialysis treatments    Retinopathy of both eyes     Sleep apnea     can't use a cpap    Ulcer, stomach peptic     x 3    Unspecified cerebral artery occlusion with cerebral infarction     \"silent stroke\"    Unspecified diseases of blood and blood-forming organs     bled for 1 year straight at age 4     Past Surgical History:   Procedure Laterality Date    ABDOMEN SURGERY      ABDOMINAL EXPLORATION SURGERY  13    EXPLORATORY LAPAROTOMY LYSIS OF ADHESIONS    APPENDECTOMY      CARPAL TUNNEL RELEASE      both hands    CATARACT REMOVAL WITH IMPLANT  11    right    CATARACT REMOVAL WITH IMPLANT  2011    left eye     SECTION      COLONOSCOPY      polyps    COLONOSCOPY  2021    DILATATION, ESOPHAGUS      ENDOSCOPY, COLON, DIAGNOSTIC      EYE SURGERY      laser, vitrectomy ou, cataract ou    FRACTURE SURGERY      HYSTERECTOMY      partial    JOINT REPLACEMENT      both knees    OTHER SURGICAL HISTORY  8/15/11    Left Shoulder Decompression    OTHER SURGICAL HISTORY      PORT REMOVAL (N/A Chest    PORT SURGERY N/A 12/3/2020    PORT REMOVAL performed by Alf Stauffer MD at [facility] Left 12/31/2020    PORT INSERTION performed by Alf Stauffer MD at [facility]      right     SUBTOTAL COLECTOMY  6/14/2000    TONSILLECTOMY      TUNNELED CENTRAL VENOUS CATHETER W/ SUBCUTANEOUS PORT Left 12/31/2020    TUNNELED VENOUS PORT PLACEMENT  8/12/11    TUNNELED VENOUS PORT PLACEMENT  3/1/13    Devonte Bustillos       Medications reviewed  Prior to Admission medications    Medication Sig Start Date End Date Taking? Authorizing Provider   sodium bicarbonate 325 MG tablet Take 650 mg by mouth 2 times daily    Historical Provider, MD   medroxyPROGESTERone (PROVERA) 10 MG tablet TAKE ONE TABLET BY MOUTH DAILY 4/20/20   Historical Provider, MD   ondansetron (ZOFRAN ODT) 4 MG disintegrating tablet Take 1-2 tablets by mouth every 12 hours as needed for Nausea May Sub regular tablet (non-ODT) if insurance does not cover ODT. 8/27/20   CHIQUITA Thomas - JOSE   insulin glargine (BASAGLAR KWIKPEN) 100 UNIT/ML injection pen Inject 28 Units into the skin 2 times daily Before breakfast and dinner 8/9/20   Geetha Kramer MD   insulin glargine (BASAGLAR KWIKPEN) 100 UNIT/ML injection pen Inject into the skin nightly    Historical Provider, MD   methocarbamol (ROBAXIN) 500 MG tablet TAKE ONE TABLET BY MOUTH TWICE A DAY 8/12/19   Betty Mcdonough MD   diazepam (VALIUM) 10 MG tablet take 1 tablet by oral route  every bedtime    Historical Provider, MD   atorvastatin (LIPITOR) 40 MG tablet Take 40 mg by mouth    Historical Provider, MD   Misc.  Devices [facility]) MISC 1 each by Does not apply route daily 10/9/17   JAKE Gamez   insulin aspart (NOVOLOG FLEXPEN) 100 UNIT/ML injection pen Continue with your current aspart insulin regimen (fairly complex aspart system which is a combination of SSI, carb counting, scheduled post-meal insulin, and adjustments based on her level of nausea at the time - about 40u/day). Patient taking differently: Continue with your current aspart insulin regimen (fairly complex aspart system which is a combination of SSI, carb counting, scheduled post-meal insulin, and adjustments based on her level of nausea at the time - about 40u/day). 1 unit per 8 carbs and 1 unit for every 12 points but pt is unsure what her \"high\" number is. 7/17/17   Castaner MD Rihsi   timolol (BETIMOL) 0.25 % ophthalmic solution 1-2 drops 2 times daily    Historical Provider, MD   Cholecalciferol (VITAMIN D3) 2000 UNITS CAPS Take  by mouth daily. Historical Provider, MD   Cyanocobalamin (VITAMIN B 12 PO) Take 1 tablet by mouth daily. Historical Provider, MD   L-Lysine 500 MG CAPS Take  by mouth 4 times daily as needed. Historical Provider, MD   sertraline (ZOLOFT) 100 MG tablet Take 200 mg by mouth nightly. Historical Provider, MD   lactulose (CHRONULAC) 10 GM/15ML solution Take 20 g by mouth daily. Historical Provider, MD   olopatadine (PATANOL) 0.1 % ophthalmic solution 1 drop 2 times daily. Historical Provider, MD   medroxyPROGESTERone (PROVERA) 2.5 MG tablet Take 10 mg by mouth daily. Historical Provider, MD   montelukast (SINGULAIR) 10 MG tablet Take 10 mg by mouth nightly. Historical Provider, MD        Allergies: Allergies   Allergen Reactions    Adhesive Tape Dermatitis    Ambien [Zolpidem]      Made me wild    Codeine      Can take a little just not a lot at a time    Flurosyn [Fluocinolone Acetonide]      \"flurosceen eye dye\" severe HA, fainting    Morphine      Went bezerk    Tegaderm Ag Mesh [Silver] Dermatitis     All types of tegaderm and adhesives       Nurses notes reviewed and agreed.     Physical Exam:  Vital Signs: BP (!) 167/51   Pulse 73   Temp 96.8 °F (36 °C) (Temporal)   Resp 20   Ht 5' 4\" (1.626 m)   Wt (!) 304 lb (137.9 kg)   SpO2 99%   BMI 52.18 kg/m²    Airway: Mallampati: II (soft palate, uvula, fauces visible)  Pulmonary:Normal  Cardiac:Normal  Abdomen:Normal    Pre-Procedure Assessment / Plan:  ASA: Class 3 - A patient with severe systemic disease that limits activity but is not incapacitating  Level of Sedation Plan: Moderate sedation  Post Procedure plan: Return to same level of care    I assessed the patient and find that the patient is in satisfactory condition to proceed with the planned procedure and sedation plan. I have explained the risk, benefits, and alternatives to the procedure; the patient understands and agrees to proceed.        Bryon Goldstein  3/17/2021

## 2021-03-17 NOTE — BRIEF OP NOTE
Brief Postoperative Note      Patient: Josefina Jesus  YOB: 1960  MRN: [id number]    Date of Procedure: 3/17/2021    Pre-Op Diagnosis: SCREENING    Post-Op Diagnosis: tortuous colon, normal ileocolonic anastomosis. internal hemorrhoids       Procedure(s):  COLON W/ANES. [employer] PT, OWN POA. NEG. COVID, RESULTS ON CHART    Surgeon(s):  Edelmira Christian MD    Assistant:  * No surgical staff found *    Anesthesia: Monitor Anesthesia Care    Estimated Blood Loss (mL): Minimal    Complications: None    Specimens:   * No specimens in log *    Implants:  * No implants in log *      Drains: * No LDAs found *    Findings: tortuous colon, normal ileocolonic anastomosis. internal hemorrhoids    Electronically signed by Edelmira Christian MD on 3/17/2021 at 9:52 AM

## 2021-03-17 NOTE — ANESTHESIA PRE PROCEDURE
Department of Anesthesiology  Preprocedure Note       Name:  Gopi Hightower   Age:  63 y.o.  :  1960                                          MRN:  [id number]         Date:  3/17/2021      Surgeon: Meme Munoz):  Florencia Ashby MD    Procedure: Procedure(s):  COLON W/ANES. [facility] PT, OWN POA. NEG. COVID, RESULTS ON CHART    Medications prior to admission:   Prior to Admission medications    Medication Sig Start Date End Date Taking? Authorizing Provider   sodium bicarbonate 325 MG tablet Take 650 mg by mouth 2 times daily    Historical Provider, MD   medroxyPROGESTERone (PROVERA) 10 MG tablet TAKE ONE TABLET BY MOUTH DAILY 20   Historical Provider, MD   ondansetron (ZOFRAN ODT) 4 MG disintegrating tablet Take 1-2 tablets by mouth every 12 hours as needed for Nausea May Sub regular tablet (non-ODT) if insurance does not cover ODT. 20   CHIQUITA Rosas - CNP   insulin glargine (BASAGLAR KWIKPEN) 100 UNIT/ML injection pen Inject 28 Units into the skin 2 times daily Before breakfast and dinner 20   Kong Quick MD   insulin glargine (BASAGLAR KWIKPEN) 100 UNIT/ML injection pen Inject into the skin nightly    Historical Provider, MD   methocarbamol (ROBAXIN) 500 MG tablet TAKE ONE TABLET BY MOUTH TWICE A DAY 19   Mane Patel MD   diazepam (VALIUM) 10 MG tablet take 1 tablet by oral route  every bedtime    Historical Provider, MD   atorvastatin (LIPITOR) 40 MG tablet Take 40 mg by mouth    Historical Provider, MD   Misc. Devices [facility]) MISC 1 each by Does not apply route daily 10/9/17   JAKE Madison   insulin aspart (NOVOLOG FLEXPEN) 100 UNIT/ML injection pen Continue with your current aspart insulin regimen (fairly complex aspart system which is a combination of SSI, carb counting, scheduled post-meal insulin, and adjustments based on her level of nausea at the time - about 40u/day).   Patient taking differently: Continue with your current aspart insulin regimen (fairly complex aspart system which is a combination of SSI, carb counting, scheduled post-meal insulin, and adjustments based on her level of nausea at the time - about 40u/day). 1 unit per 8 carbs and 1 unit for every 12 points but pt is unsure what her \"high\" number is. 7/17/17   Mauro Kim MD   timolol (BETIMOL) 0.25 % ophthalmic solution 1-2 drops 2 times daily    Historical Provider, MD   Cholecalciferol (VITAMIN D3) 2000 UNITS CAPS Take  by mouth daily. Historical Provider, MD   Cyanocobalamin (VITAMIN B 12 PO) Take 1 tablet by mouth daily. Historical Provider, MD   L-Lysine 500 MG CAPS Take  by mouth 4 times daily as needed. Historical Provider, MD   sertraline (ZOLOFT) 100 MG tablet Take 200 mg by mouth nightly. Historical Provider, MD   lactulose (CHRONULAC) 10 GM/15ML solution Take 20 g by mouth daily. Historical Provider, MD   ketorolac (ACULAR LS) 0.4 % SOLN ophthalmic solution 1 drop daily. 2/27/11   Historical Provider, MD   olopatadine (PATANOL) 0.1 % ophthalmic solution 1 drop 2 times daily. Historical Provider, MD   medroxyPROGESTERone (PROVERA) 2.5 MG tablet Take 10 mg by mouth daily. Historical Provider, MD   montelukast (SINGULAIR) 10 MG tablet Take 10 mg by mouth nightly. Historical Provider, MD       Current medications:    Current Facility-Administered Medications   Medication Dose Route Frequency Provider Last Rate Last Admin    lactated ringers infusion   Intravenous Continuous James Wesley MD           Allergies:     Allergies   Allergen Reactions    Adhesive Tape Dermatitis    Ambien [Zolpidem]      Made me wild    Codeine      Can take a little just not a lot at a time    Flurosyn [Fluocinolone Acetonide]      \"flurosceen eye dye\" severe HA, fainting    Morphine      Went bezerk    Tegaderm Ag Mesh [Silver] Dermatitis     All types of tegaderm and adhesives       Problem List:    Patient Active Problem List   Diagnosis Code    Diabetes mellitus ([facility] E11.9    Essential hypertension I10    Vomiting R11.10    Morbid obesity due to excess calories ([facility]) E66.01    Chronic pain associated with significant psychosocial dysfunction G89.4    SBO (small bowel obstruction) ([facility] U26.327    Metabolic encephalopathy A11.18    Chronic kidney disease, stage 3, mod decreased GFR ([facility]) N18.30    HLD (hyperlipidemia) E78.5    Contusion of toe, right S90.121A    Degenerative arthritis of thumb M18.10    Tendinitis of left wrist M77.8    Hand pain M79.643    Heel pain, chronic M79.673, G89.29    Pain in joint, lower leg M25.569    Knee joint replacement status Z96.659    Contusion of knee S80.00XA    Plantar fasciitis, bilateral M72.2    Bilateral chronic knee pain M25.561, M25.562, G89.29    Back pain M54.9    Lumbar sprain S33. 5XXA    Knee MCL sprain S83.419A    DDD (degenerative disc disease), lumbar M51.36    Acute pain of left shoulder M25.512    Right wrist pain M25.531    Right wrist sprain S63.501A    Rotator cuff strain S46.019A    Contusion of left shoulder S40.012A    Pain in scapula M89.8X1    Acute pain of right shoulder M25.511    DKA (diabetic ketoacidoses) ([facility]) E11.10    THEODORE (acute kidney injury) ([facility] N17.9    NSTEMI (non-ST elevated myocardial infarction) ([facility] I21.4    Diabetic ketoacidosis with coma ([facility]) E11.11    Non-traumatic rhabdomyolysis M62.82    Cervical pain M54.2    Closed avulsion fracture of lateral malleolus of left fibula S82. 62XA    Pain of right thumb M79.644    Acute left ankle pain M25.572    Gamekeeper's thumb of right hand S53. 31XA    Frequent falls R29.6   [facility] or reservoir infection T80.212A    Difficult intravenous access Z78.9       Past Medical History:        Diagnosis Date    Acid reflux     Acute renal failure ([facility] 1/2011    resolved    Anemia 2011    Arthritis     causing severe lower back pains    Asthma     Bronchitis     Bursitis     all joints    C. difficile colitis     Diabetes mellitus ([facility]     age 4   [facility]     5    History of blood transfusion     Hypercholesteremia     Hypertension     Migraine     Neuropathy     hips to legs    Obesity     Psychiatric problem     anxiety and depression    Recurrent sinus infections     Renal failure     in past with 8 dialysis treatments    Retinopathy of both eyes     Sleep apnea     can't use a cpap    Ulcer, stomach peptic     x 3    Unspecified cerebral artery occlusion with cerebral infarction     \"silent stroke\"    Unspecified diseases of blood and blood-forming organs     bled for 1 year straight at age 4       Past Surgical History:        Procedure Laterality Date    ABDOMEN SURGERY      ABDOMINAL EXPLORATION SURGERY  13    EXPLORATORY LAPAROTOMY LYSIS OF ADHESIONS    APPENDECTOMY      CARPAL TUNNEL RELEASE      both hands    CATARACT REMOVAL WITH IMPLANT  11    right    CATARACT REMOVAL WITH IMPLANT  2011    left eye     SECTION      COLONOSCOPY      DILATATION, ESOPHAGUS      ENDOSCOPY, COLON, DIAGNOSTIC      EYE SURGERY      laser, vitrectomy ou, cataract ou    FRACTURE SURGERY      HYSTERECTOMY      partial    JOINT REPLACEMENT      both knees    OTHER SURGICAL HISTORY  8/15/11    Left Shoulder Decompression    OTHER SURGICAL HISTORY      PORT REMOVAL (N/A Chest    PORT SURGERY N/A 12/3/2020    PORT REMOVAL performed by Shad Byers MD at [address] Left 2020    PORT INSERTION performed by Shad Byers MD at [facility]      right     SUBTOTAL COLECTOMY  2000    TONSILLECTOMY      TUNNELED CENTRAL VENOUS CATHETER W/ SUBCUTANEOUS PORT Left 2020    TUNNELED VENOUS PORT PLACEMENT  11    TUNNELED VENOUS PORT PLACEMENT  3/1/13    Devonte Bustillos       Social History:    Social History     Tobacco Use    Smoking status: Never Smoker    Smokeless tobacco: Never Used Substance Use Topics    Alcohol use: No                                Counseling given: Not Answered      Vital Signs (Current):   Vitals:    03/17/21 0810   BP: (!) 167/51   Pulse: 73   Resp: 20   Temp: 96.8 °F (36 °C)   TempSrc: Temporal   SpO2: 99%   Weight: (!) 304 lb (137.9 kg)   Height: 5' 4\" (1.626 m)                                              BP Readings from Last 3 Encounters:   03/17/21 (!) 167/51   12/31/20 (!) 114/58   12/31/20 (!) 162/64       NPO Status: Time of last liquid consumption: 0530(colon prep)                        Time of last solid consumption: 0826                        Date of last liquid consumption: 03/17/21                        Date of last solid food consumption: 03/15/21    BMI:   Wt Readings from Last 3 Encounters:   03/17/21 (!) 304 lb (137.9 kg)   12/23/20 (!) 369 lb (167.4 kg)   12/09/20 (!) 367 lb (166.5 kg)     Body mass index is 52.18 kg/m². CBC:   Lab Results   Component Value Date    WBC 7.3 12/09/2020    RBC 3.82 12/09/2020    HGB 12.2 12/09/2020    HCT 36.8 12/09/2020    MCV 96.2 12/09/2020    RDW 13.2 12/09/2020     12/09/2020       CMP:   Lab Results   Component Value Date     12/09/2020    K 4.0 12/09/2020    K 3.6 08/14/2020    CL 97 12/09/2020    CO2 26 12/09/2020    BUN 10 12/09/2020    CREATININE 1.1 12/09/2020    GFRAA >60 12/09/2020    GFRAA >60 03/22/2013    AGRATIO 1.3 12/09/2020    LABGLOM 50 12/09/2020    GLUCOSE 89 12/10/2020    PROT 6.8 12/09/2020    PROT 5.0 01/27/2013    CALCIUM 9.8 12/09/2020    BILITOT 0.3 12/09/2020    ALKPHOS 74 12/09/2020    AST 20 12/09/2020    ALT 8 12/09/2020       POC Tests: No results for input(s): POCGLU, POCNA, POCK, POCCL, POCBUN, POCHEMO, POCHCT in the last 72 hours.     Coags:   Lab Results   Component Value Date    PROTIME 12.4 07/04/2017    INR 1.10 07/04/2017    APTT 40.9 07/07/2017       HCG (If Applicable): No results found for: PREGTESTUR, PREGSERUM, HCG, HCGQUANT     ABGs:   Lab Results Component Value Date    PHART 7.202 07/04/2017    PO2ART 170.6 07/04/2017    HMM1NII 27.3 07/04/2017    BLE2TPR 10.5 07/04/2017    BEART -16.1 07/04/2017    D8APPMOQ 98.4 07/04/2017        Type & Screen (If Applicable):  Lab Results   Component Value Date    LABABO A 02/10/2011    LABRH Negative 02/10/2011       Drug/Infectious Status (If Applicable):  Lab Results   Component Value Date    HEPCAB Non-Reactive (Negative) 04/15/2011       COVID-19 Screening (If Applicable):   Lab Results   Component Value Date    COVID19 Not Detected 08/11/2020           Anesthesia Evaluation   no history of anesthetic complications:   Airway: Mallampati: II  TM distance: <3 FB   Neck ROM: limited  Mouth opening: > = 3 FB Dental:          Pulmonary:   (+) sleep apnea: on noncompliant,  asthma:                            Cardiovascular:    (+) hypertension:, past MI:, hyperlipidemia                  Neuro/Psych:   (+) CVA:, neuromuscular disease:, headaches:, psychiatric history:depression/anxiety             GI/Hepatic/Renal:   (+) GERD:, PUD, renal disease: CRI,           Endo/Other:    (+) Diabetesusing insulin, . Abdominal:           Vascular: negative vascular ROS. Anesthesia Plan      general     ASA 4     (Risks, benefits and alternatives of GA discussed with pt. Questions answered. Willing to proceed.)  Induction: intravenous. Anesthetic plan and risks discussed with patient and child/children.                       Rusty Real MD   3/17/2021

## 2021-03-17 NOTE — ANESTHESIA POSTPROCEDURE EVALUATION
Department of Anesthesiology  Postprocedure Note    Patient: Taylor Barrientos  MRN: [id number]  YOB: 1960  Date of evaluation: 3/17/2021  Time:  12:25 PM     Procedure Summary     Date: 03/17/21 Room / Location: [place] [address] / [facility]    Anesthesia Start: 1218 Anesthesia Stop: 0322    Procedure: COLON W/ANES. [facility] PT, OWN POA. NEG. COVID, RESULTS ON CHART (N/A ) Diagnosis:       Special screening for malignant neoplasms, colon      (SCREENING)    Surgeons: Beatriz Spencer MD Responsible Provider: Christina Arshad MD    Anesthesia Type: general ASA Status: 4          Anesthesia Type: general    Pam Phase I: Pam Score: 10    Pam Phase II: Pam Score: 10    Last vitals: Reviewed and per EMR flowsheets. Anesthesia Post Evaluation    Patient location during evaluation: PACU  Patient participation: complete - patient participated  Level of consciousness: awake and alert  Airway patency: patent  Nausea & Vomiting: no nausea and no vomiting  Complications: no  Cardiovascular status: blood pressure returned to baseline  Respiratory status: acceptable  Hydration status: euvolemic  Comments: VSS on transfer to phase 2 recovery. No anesthetic complications.

## 2021-03-17 NOTE — PROGRESS NOTES
Report called to [facility]. Unable to reach finally gave report at 11:24. Spoke with her nurse at the facility [facility].

## 2021-03-17 NOTE — OP NOTE
[address]                 [address] [place] [address]                                OPERATIVE REPORT    PATIENT NAME: Ava Plaza                   :        1960  MED REC NO:   [id number]                          ROOM:  ACCOUNT NO:   [de-identified]                           ADMIT DATE: 2021  PROVIDER:     Kadi Wilks MD    DATE OF PROCEDURE:  2021    PRE-PROCEDURE DIAGNOSES:  1.  Small bowel ileus. 2.  Colon ileus. 3.  Subtotal colectomy. 4.  Screening colonoscopy. PROCEDURE:  Colonoscopy to the ileocolonic anastomosis. POSTPROCEDURE DIAGNOSES:  1. Tortuous redundant sigmoid colon. 2.  Normal ileocolonic anastomosis. 3.  No evidence of polyps or malignancies. PROCEDURE INDICATIONS:  This is a 56-year-old female with a history of  diabetes, hypertension, hyperlipidemia, chronic abdominal pain, colonic  inertia status post subtotal colectomy, small bowel obstruction, chronic  constipation, presents for screening colonoscopy. Her last colonoscopy  was 10 years ago. She has recently developed progressively worsening  abdominal bloating, distention, and ileus. MEDICATIONS:  MAC per Anesthesia. DETAILS OF THE PROCEDURE:  Informed consent obtained after discussing  risks, benefits, and alternatives. Full history and physical was  performed. The patient was classified as ASA class III. Medications  were given to achieve adequate sedation. Cardiopulmonary status was  continuously monitored throughout this procedure. The patient was  placed in left lateral decubitus position. Once adequately positioned,  a standard pediatric colonoscope was inserted in the anus and advanced  to the ileocolonic anastomosis. The entire mucosa of the transverse  colon, descending colon, sigmoid colon, and rectum were examined  carefully during withdrawal.  The patient tolerated the procedure well  without any difficulties. FINDINGS:  RECTUM:  The digital rectal exam was normal.  No masses were felt. Retroflexed view of the rectum showed internal hemorrhoids. COLON:  There was an ileocolonic anastomosis in the mid-transverse  colon. The anastomosis appeared normal, patent, and healthy. The  visualized portions of the colon and the transverse, descending, and  sigmoid, as well as the rectum, all appeared normal.  The colon was  moderately tortuous and redundant requiring manual pressure to reach the  destination. SUMMARY:  1. Normal ileocolonic anastomosis. 2.  Tortuous, redundant sigmoid colon. 3.  Internal hemorrhoids. 4.  No evidence of polyps or malignancy on this exam.    RECOMMENDATIONS:  1. Discharge the patient to home when standard parameters are met. 2.  The patient's symptoms are primarily related to ileus. 3.  Recommend physical therapy. 4.  Monitor electrolytes and replenish as needed. 5.  Minimize narcotics. 6.  Follow up as needed. 7.  The patient may benefit from Reglan. 8.  Repeat colonoscopy in 10 years for screening purposes.     EBL: <5mL    Sophia Galo MD    D: 03/17/2021 9:56:52       T: 03/17/2021 10:05:19     [id number]  Job#: [id number]     Doc#: [id number]    CC:  MD Tobin Simmons

## 2021-09-08 NOTE — ED NOTES
Pt states \"I am not going back to that facility, I'm going to have my brother come pick me up\". Pt is non-ambulatory and at facility for rehab d/t weakness. Pt was educated that we cannot put her in an unsafe situation such as putting her in a car at this time as she is too weak for physical transfer. Pt instructed that if she's unhappy with her rehab facility, she needs to return to it and arrange for a transfer to another facility from there. Pt informed she is not admitted to the hospital and this will need to be handled at her facility. Charge nurse notified.       Alejandra Ervin RN  08/14/20 2231 Detail Level: Simple Instructions: This plan will send the code FBSE to the PM system.  DO NOT or CHANGE the price. Price (Do Not Change): 0.00

## 2022-01-01 ENCOUNTER — APPOINTMENT (OUTPATIENT)
Dept: GENERAL RADIOLOGY | Age: 62
DRG: 871 | End: 2022-01-01
Payer: COMMERCIAL

## 2022-01-01 ENCOUNTER — HOSPITAL ENCOUNTER (INPATIENT)
Age: 62
LOS: 2 days | DRG: 871 | End: 2022-06-29
Attending: EMERGENCY MEDICINE | Admitting: INTERNAL MEDICINE
Payer: COMMERCIAL

## 2022-01-01 ENCOUNTER — APPOINTMENT (OUTPATIENT)
Dept: CT IMAGING | Age: 62
DRG: 871 | End: 2022-01-01
Payer: COMMERCIAL

## 2022-01-01 VITALS
BODY MASS INDEX: 45.65 KG/M2 | SYSTOLIC BLOOD PRESSURE: 54 MMHG | OXYGEN SATURATION: 84 % | TEMPERATURE: 99.8 F | HEIGHT: 64 IN | WEIGHT: 267.42 LBS | DIASTOLIC BLOOD PRESSURE: 32 MMHG

## 2022-01-01 DIAGNOSIS — A41.9 SEPTIC SHOCK (HCC): Primary | ICD-10-CM

## 2022-01-01 DIAGNOSIS — R65.21 SEPTIC SHOCK (HCC): Primary | ICD-10-CM

## 2022-01-01 DIAGNOSIS — K92.2 GASTROINTESTINAL HEMORRHAGE, UNSPECIFIED GASTROINTESTINAL HEMORRHAGE TYPE: ICD-10-CM

## 2022-01-01 LAB
A/G RATIO: 0.8 (ref 1.1–2.2)
ABO/RH: NORMAL
ALBUMIN SERPL-MCNC: 3 G/DL (ref 3.4–5)
ALP BLD-CCNC: 65 U/L (ref 40–129)
ALT SERPL-CCNC: 39 U/L (ref 10–40)
ANION GAP SERPL CALCULATED.3IONS-SCNC: 22 MMOL/L (ref 3–16)
ANTIBODY SCREEN: NORMAL
AST SERPL-CCNC: 55 U/L (ref 15–37)
BASE EXCESS VENOUS: -12.6 MMOL/L (ref -3–3)
BASOPHILS ABSOLUTE: 0 K/UL (ref 0–0.2)
BASOPHILS RELATIVE PERCENT: 0.1 %
BILIRUB SERPL-MCNC: 0.3 MG/DL (ref 0–1)
BILIRUBIN URINE: NEGATIVE
BLOOD, URINE: NEGATIVE
BUN BLDV-MCNC: 55 MG/DL (ref 7–20)
CALCIUM SERPL-MCNC: 9.8 MG/DL (ref 8.3–10.6)
CARBOXYHEMOGLOBIN: 1.5 % (ref 0–1.5)
CHLORIDE BLD-SCNC: 103 MMOL/L (ref 99–110)
CLARITY: CLEAR
CO2: 12 MMOL/L (ref 21–32)
COLOR: YELLOW
CREAT SERPL-MCNC: 3.2 MG/DL (ref 0.6–1.2)
EKG ATRIAL RATE: 97 BPM
EKG DIAGNOSIS: NORMAL
EKG P AXIS: 54 DEGREES
EKG P-R INTERVAL: 152 MS
EKG Q-T INTERVAL: 348 MS
EKG QRS DURATION: 88 MS
EKG QTC CALCULATION (BAZETT): 441 MS
EKG R AXIS: -24 DEGREES
EKG T AXIS: 50 DEGREES
EKG VENTRICULAR RATE: 97 BPM
EOSINOPHILS ABSOLUTE: 0 K/UL (ref 0–0.6)
EOSINOPHILS RELATIVE PERCENT: 0 %
GFR AFRICAN AMERICAN: 18
GFR NON-AFRICAN AMERICAN: 15
GLUCOSE BLD-MCNC: 163 MG/DL (ref 70–99)
GLUCOSE BLD-MCNC: 247 MG/DL (ref 70–99)
GLUCOSE BLD-MCNC: 264 MG/DL (ref 70–99)
GLUCOSE URINE: NEGATIVE MG/DL
HCO3 VENOUS: 14.1 MMOL/L (ref 23–29)
HCT VFR BLD CALC: 38.6 % (ref 36–48)
HCT VFR BLD CALC: 40.1 % (ref 36–48)
HCT VFR BLD CALC: 42.6 % (ref 36–48)
HEMOGLOBIN: 12.6 G/DL (ref 12–16)
HEMOGLOBIN: 12.9 G/DL (ref 12–16)
HEMOGLOBIN: 13.9 G/DL (ref 12–16)
INR BLD: 1.13 (ref 0.87–1.14)
KETONES, URINE: NEGATIVE MG/DL
LACTIC ACID, SEPSIS: 3.8 MMOL/L (ref 0.4–1.9)
LACTIC ACID: 4 MMOL/L (ref 0.4–2)
LEUKOCYTE ESTERASE, URINE: NEGATIVE
LIPASE: 21 U/L (ref 13–60)
LYMPHOCYTES ABSOLUTE: 0.9 K/UL (ref 1–5.1)
LYMPHOCYTES RELATIVE PERCENT: 10.5 %
MCH RBC QN AUTO: 32.6 PG (ref 26–34)
MCHC RBC AUTO-ENTMCNC: 32.5 G/DL (ref 31–36)
MCV RBC AUTO: 100.4 FL (ref 80–100)
METHEMOGLOBIN VENOUS: 0.5 %
MICROSCOPIC EXAMINATION: NORMAL
MONOCYTES ABSOLUTE: 1 K/UL (ref 0–1.3)
MONOCYTES RELATIVE PERCENT: 10.8 %
NEUTROPHILS ABSOLUTE: 6.9 K/UL (ref 1.7–7.7)
NEUTROPHILS RELATIVE PERCENT: 78.6 %
NITRITE, URINE: NEGATIVE
O2 SAT, VEN: 88 %
O2 THERAPY: ABNORMAL
PCO2, VEN: 35.2 MMHG (ref 40–50)
PDW BLD-RTO: 14.1 % (ref 12.4–15.4)
PERFORMED ON: ABNORMAL
PERFORMED ON: ABNORMAL
PH UA: 6 (ref 5–8)
PH VENOUS: 7.22 (ref 7.35–7.45)
PLATELET # BLD: 339 K/UL (ref 135–450)
PMV BLD AUTO: 10 FL (ref 5–10.5)
PO2, VEN: 60 MMHG (ref 25–40)
POTASSIUM REFLEX MAGNESIUM: 5.2 MMOL/L (ref 3.5–5.1)
PRO-BNP: ABNORMAL PG/ML (ref 0–124)
PROCALCITONIN: 92.24 NG/ML (ref 0–0.15)
PROCALCITONIN: >100 NG/ML (ref 0–0.15)
PROTEIN UA: NEGATIVE MG/DL
PROTHROMBIN TIME: 14.4 SEC (ref 11.7–14.5)
RBC # BLD: 4.24 M/UL (ref 4–5.2)
REPORT: NORMAL
SODIUM BLD-SCNC: 137 MMOL/L (ref 136–145)
SPECIFIC GRAVITY UA: 1.01 (ref 1–1.03)
TCO2 CALC VENOUS: 15 MMOL/L
TOTAL PROTEIN: 6.6 G/DL (ref 6.4–8.2)
TROPONIN: 0.05 NG/ML
URINE REFLEX TO CULTURE: NORMAL
URINE TYPE: NORMAL
UROBILINOGEN, URINE: 0.2 E.U./DL
WBC # BLD: 8.8 K/UL (ref 4–11)

## 2022-01-01 PROCEDURE — 83880 ASSAY OF NATRIURETIC PEPTIDE: CPT

## 2022-01-01 PROCEDURE — 51702 INSERT TEMP BLADDER CATH: CPT

## 2022-01-01 PROCEDURE — 87040 BLOOD CULTURE FOR BACTERIA: CPT

## 2022-01-01 PROCEDURE — 6360000002 HC RX W HCPCS: Performed by: INTERNAL MEDICINE

## 2022-01-01 PROCEDURE — 6370000000 HC RX 637 (ALT 250 FOR IP): Performed by: INTERNAL MEDICINE

## 2022-01-01 PROCEDURE — 96365 THER/PROPH/DIAG IV INF INIT: CPT

## 2022-01-01 PROCEDURE — 87150 DNA/RNA AMPLIFIED PROBE: CPT

## 2022-01-01 PROCEDURE — 85014 HEMATOCRIT: CPT

## 2022-01-01 PROCEDURE — C9113 INJ PANTOPRAZOLE SODIUM, VIA: HCPCS | Performed by: INTERNAL MEDICINE

## 2022-01-01 PROCEDURE — 93010 ELECTROCARDIOGRAM REPORT: CPT | Performed by: INTERNAL MEDICINE

## 2022-01-01 PROCEDURE — 99285 EMERGENCY DEPT VISIT HI MDM: CPT

## 2022-01-01 PROCEDURE — 6360000002 HC RX W HCPCS

## 2022-01-01 PROCEDURE — 2500000003 HC RX 250 WO HCPCS: Performed by: PHYSICIAN ASSISTANT

## 2022-01-01 PROCEDURE — 2000000000 HC ICU R&B

## 2022-01-01 PROCEDURE — 36415 COLL VENOUS BLD VENIPUNCTURE: CPT

## 2022-01-01 PROCEDURE — 6360000002 HC RX W HCPCS: Performed by: NURSE PRACTITIONER

## 2022-01-01 PROCEDURE — 02HV33Z INSERTION OF INFUSION DEVICE INTO SUPERIOR VENA CAVA, PERCUTANEOUS APPROACH: ICD-10-PCS | Performed by: EMERGENCY MEDICINE

## 2022-01-01 PROCEDURE — 6360000002 HC RX W HCPCS: Performed by: PHYSICIAN ASSISTANT

## 2022-01-01 PROCEDURE — 85610 PROTHROMBIN TIME: CPT

## 2022-01-01 PROCEDURE — 80053 COMPREHEN METABOLIC PANEL: CPT

## 2022-01-01 PROCEDURE — 84145 PROCALCITONIN (PCT): CPT

## 2022-01-01 PROCEDURE — 2580000003 HC RX 258: Performed by: INTERNAL MEDICINE

## 2022-01-01 PROCEDURE — 81003 URINALYSIS AUTO W/O SCOPE: CPT

## 2022-01-01 PROCEDURE — 93005 ELECTROCARDIOGRAM TRACING: CPT | Performed by: PHYSICIAN ASSISTANT

## 2022-01-01 PROCEDURE — 96360 HYDRATION IV INFUSION INIT: CPT

## 2022-01-01 PROCEDURE — 96375 TX/PRO/DX INJ NEW DRUG ADDON: CPT

## 2022-01-01 PROCEDURE — APPNB45 APP NON BILLABLE 31-45 MINUTES: Performed by: NURSE PRACTITIONER

## 2022-01-01 PROCEDURE — 74176 CT ABD & PELVIS W/O CONTRAST: CPT

## 2022-01-01 PROCEDURE — 83605 ASSAY OF LACTIC ACID: CPT

## 2022-01-01 PROCEDURE — 85025 COMPLETE CBC W/AUTO DIFF WBC: CPT

## 2022-01-01 PROCEDURE — 2580000003 HC RX 258: Performed by: EMERGENCY MEDICINE

## 2022-01-01 PROCEDURE — 85018 HEMOGLOBIN: CPT

## 2022-01-01 PROCEDURE — 86900 BLOOD TYPING SEROLOGIC ABO: CPT

## 2022-01-01 PROCEDURE — P9016 RBC LEUKOCYTES REDUCED: HCPCS

## 2022-01-01 PROCEDURE — 83690 ASSAY OF LIPASE: CPT

## 2022-01-01 PROCEDURE — 84484 ASSAY OF TROPONIN QUANT: CPT

## 2022-01-01 PROCEDURE — 36556 INSERT NON-TUNNEL CV CATH: CPT

## 2022-01-01 PROCEDURE — 2580000003 HC RX 258: Performed by: PHYSICIAN ASSISTANT

## 2022-01-01 PROCEDURE — 86901 BLOOD TYPING SEROLOGIC RH(D): CPT

## 2022-01-01 PROCEDURE — C9113 INJ PANTOPRAZOLE SODIUM, VIA: HCPCS | Performed by: PHYSICIAN ASSISTANT

## 2022-01-01 PROCEDURE — 71045 X-RAY EXAM CHEST 1 VIEW: CPT

## 2022-01-01 PROCEDURE — 86923 COMPATIBILITY TEST ELECTRIC: CPT

## 2022-01-01 PROCEDURE — 82803 BLOOD GASES ANY COMBINATION: CPT

## 2022-01-01 PROCEDURE — 86850 RBC ANTIBODY SCREEN: CPT

## 2022-01-01 PROCEDURE — 36430 TRANSFUSION BLD/BLD COMPNT: CPT

## 2022-01-01 RX ORDER — SODIUM CHLORIDE 9 MG/ML
INJECTION, SOLUTION INTRAVENOUS PRN
Status: DISCONTINUED | OUTPATIENT
Start: 2022-01-01 | End: 2022-01-01 | Stop reason: HOSPADM

## 2022-01-01 RX ORDER — INSULIN LISPRO 100 [IU]/ML
0-6 INJECTION, SOLUTION INTRAVENOUS; SUBCUTANEOUS
Status: DISCONTINUED | OUTPATIENT
Start: 2022-01-01 | End: 2022-01-01

## 2022-01-01 RX ORDER — ONDANSETRON 2 MG/ML
INJECTION INTRAMUSCULAR; INTRAVENOUS
Status: COMPLETED
Start: 2022-01-01 | End: 2022-01-01

## 2022-01-01 RX ORDER — LORAZEPAM 2 MG/ML
1 INJECTION INTRAMUSCULAR ONCE
Status: COMPLETED | OUTPATIENT
Start: 2022-01-01 | End: 2022-01-01

## 2022-01-01 RX ORDER — HYDROMORPHONE HCL 110MG/55ML
1 PATIENT CONTROLLED ANALGESIA SYRINGE INTRAVENOUS
Status: DISCONTINUED | OUTPATIENT
Start: 2022-01-01 | End: 2022-01-01 | Stop reason: HOSPADM

## 2022-01-01 RX ORDER — 0.9 % SODIUM CHLORIDE 0.9 %
30 INTRAVENOUS SOLUTION INTRAVENOUS ONCE
Status: COMPLETED | OUTPATIENT
Start: 2022-01-01 | End: 2022-01-01

## 2022-01-01 RX ORDER — LORAZEPAM 2 MG/ML
1 CONCENTRATE ORAL
Status: DISCONTINUED | OUTPATIENT
Start: 2022-01-01 | End: 2022-01-01

## 2022-01-01 RX ORDER — TIZANIDINE 4 MG/1
4 TABLET ORAL EVERY 6 HOURS PRN
Status: DISCONTINUED | OUTPATIENT
Start: 2022-01-01 | End: 2022-01-01 | Stop reason: HOSPADM

## 2022-01-01 RX ORDER — ATROPINE SULFATE 10 MG/ML
2 SOLUTION/ DROPS OPHTHALMIC EVERY 4 HOURS PRN
Status: CANCELLED | OUTPATIENT
Start: 2022-01-01

## 2022-01-01 RX ORDER — SODIUM CHLORIDE 0.9 % (FLUSH) 0.9 %
5-40 SYRINGE (ML) INJECTION EVERY 12 HOURS SCHEDULED
Status: CANCELLED | OUTPATIENT
Start: 2022-01-01

## 2022-01-01 RX ORDER — LORAZEPAM 2 MG/ML
1 CONCENTRATE ORAL
Status: CANCELLED | OUTPATIENT
Start: 2022-01-01

## 2022-01-01 RX ORDER — HYDROMORPHONE HCL 110MG/55ML
1 PATIENT CONTROLLED ANALGESIA SYRINGE INTRAVENOUS
Status: CANCELLED | OUTPATIENT
Start: 2022-01-01

## 2022-01-01 RX ORDER — SODIUM CHLORIDE 9 MG/ML
INJECTION, SOLUTION INTRAVENOUS PRN
Status: CANCELLED | OUTPATIENT
Start: 2022-01-01

## 2022-01-01 RX ORDER — HYDROMORPHONE HCL 110MG/55ML
0.5 PATIENT CONTROLLED ANALGESIA SYRINGE INTRAVENOUS
Status: DISCONTINUED | OUTPATIENT
Start: 2022-01-01 | End: 2022-01-01

## 2022-01-01 RX ORDER — PANTOPRAZOLE SODIUM 40 MG/10ML
40 INJECTION, POWDER, LYOPHILIZED, FOR SOLUTION INTRAVENOUS ONCE
Status: COMPLETED | OUTPATIENT
Start: 2022-01-01 | End: 2022-01-01

## 2022-01-01 RX ORDER — ONDANSETRON 4 MG/1
4 TABLET, ORALLY DISINTEGRATING ORAL EVERY 8 HOURS PRN
Status: CANCELLED | OUTPATIENT
Start: 2022-01-01

## 2022-01-01 RX ORDER — SODIUM CHLORIDE 0.9 % (FLUSH) 0.9 %
5-40 SYRINGE (ML) INJECTION EVERY 12 HOURS SCHEDULED
Status: DISCONTINUED | OUTPATIENT
Start: 2022-01-01 | End: 2022-01-01 | Stop reason: HOSPADM

## 2022-01-01 RX ORDER — ATROPINE SULFATE 10 MG/ML
2 SOLUTION/ DROPS OPHTHALMIC EVERY 4 HOURS PRN
Status: DISCONTINUED | OUTPATIENT
Start: 2022-01-01 | End: 2022-01-01 | Stop reason: HOSPADM

## 2022-01-01 RX ORDER — ACETAMINOPHEN 650 MG/1
650 SUPPOSITORY RECTAL EVERY 6 HOURS PRN
Status: DISCONTINUED | OUTPATIENT
Start: 2022-01-01 | End: 2022-01-01 | Stop reason: HOSPADM

## 2022-01-01 RX ORDER — ACETAMINOPHEN 650 MG/1
650 SUPPOSITORY RECTAL EVERY 6 HOURS PRN
Status: CANCELLED | OUTPATIENT
Start: 2022-01-01

## 2022-01-01 RX ORDER — HYDROMORPHONE HCL 110MG/55ML
0.5 PATIENT CONTROLLED ANALGESIA SYRINGE INTRAVENOUS EVERY 4 HOURS PRN
Status: DISCONTINUED | OUTPATIENT
Start: 2022-01-01 | End: 2022-01-01

## 2022-01-01 RX ORDER — SODIUM CHLORIDE 0.9 % (FLUSH) 0.9 %
5-40 SYRINGE (ML) INJECTION PRN
Status: DISCONTINUED | OUTPATIENT
Start: 2022-01-01 | End: 2022-01-01 | Stop reason: HOSPADM

## 2022-01-01 RX ORDER — ONDANSETRON 2 MG/ML
4 INJECTION INTRAMUSCULAR; INTRAVENOUS EVERY 6 HOURS PRN
Status: DISCONTINUED | OUTPATIENT
Start: 2022-01-01 | End: 2022-01-01 | Stop reason: HOSPADM

## 2022-01-01 RX ORDER — ONDANSETRON 4 MG/1
4 TABLET, ORALLY DISINTEGRATING ORAL EVERY 8 HOURS PRN
Status: DISCONTINUED | OUTPATIENT
Start: 2022-01-01 | End: 2022-01-01 | Stop reason: HOSPADM

## 2022-01-01 RX ORDER — TIZANIDINE 4 MG/1
4 TABLET ORAL EVERY 6 HOURS PRN
Status: DISCONTINUED | OUTPATIENT
Start: 2022-01-01 | End: 2022-01-01

## 2022-01-01 RX ORDER — ONDANSETRON 2 MG/ML
4 INJECTION INTRAMUSCULAR; INTRAVENOUS EVERY 6 HOURS PRN
Status: CANCELLED | OUTPATIENT
Start: 2022-01-01

## 2022-01-01 RX ORDER — ONDANSETRON 2 MG/ML
4 INJECTION INTRAMUSCULAR; INTRAVENOUS ONCE
Status: COMPLETED | OUTPATIENT
Start: 2022-01-01 | End: 2022-01-01

## 2022-01-01 RX ORDER — HYDROMORPHONE HCL 110MG/55ML
0.5 PATIENT CONTROLLED ANALGESIA SYRINGE INTRAVENOUS
Status: DISCONTINUED | OUTPATIENT
Start: 2022-01-01 | End: 2022-01-01 | Stop reason: HOSPADM

## 2022-01-01 RX ORDER — INSULIN LISPRO 100 [IU]/ML
0-3 INJECTION, SOLUTION INTRAVENOUS; SUBCUTANEOUS NIGHTLY
Status: DISCONTINUED | OUTPATIENT
Start: 2022-01-01 | End: 2022-01-01

## 2022-01-01 RX ORDER — HYDROMORPHONE HCL 110MG/55ML
0.5 PATIENT CONTROLLED ANALGESIA SYRINGE INTRAVENOUS
Status: CANCELLED | OUTPATIENT
Start: 2022-01-01

## 2022-01-01 RX ORDER — ACETAMINOPHEN 325 MG/1
650 TABLET ORAL EVERY 6 HOURS PRN
Status: CANCELLED | OUTPATIENT
Start: 2022-01-01

## 2022-01-01 RX ORDER — DEXTROSE MONOHYDRATE 50 MG/ML
100 INJECTION, SOLUTION INTRAVENOUS PRN
Status: DISCONTINUED | OUTPATIENT
Start: 2022-01-01 | End: 2022-01-01

## 2022-01-01 RX ORDER — LORAZEPAM 2 MG/ML
0.5 INJECTION INTRAMUSCULAR
Status: DISCONTINUED | OUTPATIENT
Start: 2022-01-01 | End: 2022-01-01 | Stop reason: HOSPADM

## 2022-01-01 RX ORDER — 0.9 % SODIUM CHLORIDE 0.9 %
1000 INTRAVENOUS SOLUTION INTRAVENOUS ONCE
Status: COMPLETED | OUTPATIENT
Start: 2022-01-01 | End: 2022-01-01

## 2022-01-01 RX ORDER — ACETAMINOPHEN 325 MG/1
650 TABLET ORAL EVERY 6 HOURS PRN
Status: DISCONTINUED | OUTPATIENT
Start: 2022-01-01 | End: 2022-01-01 | Stop reason: HOSPADM

## 2022-01-01 RX ORDER — SODIUM CHLORIDE 9 MG/ML
INJECTION, SOLUTION INTRAVENOUS CONTINUOUS
Status: DISCONTINUED | OUTPATIENT
Start: 2022-01-01 | End: 2022-01-01

## 2022-01-01 RX ORDER — TIZANIDINE 4 MG/1
4 TABLET ORAL EVERY 6 HOURS PRN
Status: CANCELLED | OUTPATIENT
Start: 2022-01-01

## 2022-01-01 RX ORDER — SODIUM CHLORIDE 0.9 % (FLUSH) 0.9 %
5-40 SYRINGE (ML) INJECTION PRN
Status: CANCELLED | OUTPATIENT
Start: 2022-01-01

## 2022-01-01 RX ADMIN — LORAZEPAM 0.5 MG: 2 INJECTION INTRAMUSCULAR; INTRAVENOUS at 18:45

## 2022-01-01 RX ADMIN — HYDROMORPHONE HYDROCHLORIDE 1 MG: 2 INJECTION, SOLUTION INTRAMUSCULAR; INTRAVENOUS; SUBCUTANEOUS at 13:03

## 2022-01-01 RX ADMIN — ACETAMINOPHEN 650 MG: 325 TABLET ORAL at 01:14

## 2022-01-01 RX ADMIN — TIZANIDINE 4 MG: 4 TABLET ORAL at 14:18

## 2022-01-01 RX ADMIN — PIPERACILLIN AND TAZOBACTAM 3375 MG: 3; .375 INJECTION, POWDER, LYOPHILIZED, FOR SOLUTION INTRAVENOUS at 17:16

## 2022-01-01 RX ADMIN — HYDROMORPHONE HYDROCHLORIDE 1 MG: 2 INJECTION, SOLUTION INTRAMUSCULAR; INTRAVENOUS; SUBCUTANEOUS at 09:39

## 2022-01-01 RX ADMIN — ONDANSETRON 4 MG: 2 INJECTION INTRAMUSCULAR; INTRAVENOUS at 18:10

## 2022-01-01 RX ADMIN — TIZANIDINE 4 MG: 4 TABLET ORAL at 00:21

## 2022-01-01 RX ADMIN — LORAZEPAM 0.5 MG: 2 INJECTION INTRAMUSCULAR; INTRAVENOUS at 13:22

## 2022-01-01 RX ADMIN — INSULIN LISPRO 1 UNITS: 100 INJECTION, SOLUTION INTRAVENOUS; SUBCUTANEOUS at 21:57

## 2022-01-01 RX ADMIN — SODIUM CHLORIDE 750 ML: 9 INJECTION, SOLUTION INTRAVENOUS at 17:19

## 2022-01-01 RX ADMIN — PIPERACILLIN AND TAZOBACTAM 3375 MG: 3; .375 INJECTION, POWDER, LYOPHILIZED, FOR SOLUTION INTRAVENOUS at 10:08

## 2022-01-01 RX ADMIN — SODIUM CHLORIDE: 9 INJECTION, SOLUTION INTRAVENOUS at 21:23

## 2022-01-01 RX ADMIN — MUPIROCIN: 20 OINTMENT TOPICAL at 10:07

## 2022-01-01 RX ADMIN — DEXTROSE MONOHYDRATE 5 MCG/MIN: 50 INJECTION, SOLUTION INTRAVENOUS at 19:02

## 2022-01-01 RX ADMIN — PIPERACILLIN AND TAZOBACTAM 3375 MG: 3; .375 INJECTION, POWDER, LYOPHILIZED, FOR SOLUTION INTRAVENOUS at 00:54

## 2022-01-01 RX ADMIN — LORAZEPAM 1 MG: 2 INJECTION INTRAMUSCULAR; INTRAVENOUS at 08:37

## 2022-01-01 RX ADMIN — HYDROMORPHONE HYDROCHLORIDE 0.5 MG: 2 INJECTION, SOLUTION INTRAMUSCULAR; INTRAVENOUS; SUBCUTANEOUS at 07:55

## 2022-01-01 RX ADMIN — SODIUM CHLORIDE 1000 ML: 9 INJECTION, SOLUTION INTRAVENOUS at 15:41

## 2022-01-01 RX ADMIN — HYDROMORPHONE HYDROCHLORIDE 0.5 MG: 2 INJECTION, SOLUTION INTRAMUSCULAR; INTRAVENOUS; SUBCUTANEOUS at 03:07

## 2022-01-01 RX ADMIN — HYDROMORPHONE HYDROCHLORIDE 1 MG: 2 INJECTION, SOLUTION INTRAMUSCULAR; INTRAVENOUS; SUBCUTANEOUS at 18:45

## 2022-01-01 RX ADMIN — Medication 10 ML: at 21:10

## 2022-01-01 RX ADMIN — HYDROMORPHONE HYDROCHLORIDE 0.5 MG: 2 INJECTION, SOLUTION INTRAMUSCULAR; INTRAVENOUS; SUBCUTANEOUS at 22:02

## 2022-01-01 RX ADMIN — HYDROMORPHONE HYDROCHLORIDE 1 MG: 2 INJECTION, SOLUTION INTRAMUSCULAR; INTRAVENOUS; SUBCUTANEOUS at 11:00

## 2022-01-01 RX ADMIN — PANTOPRAZOLE SODIUM 40 MG: 40 INJECTION, POWDER, FOR SOLUTION INTRAVENOUS at 16:47

## 2022-01-01 RX ADMIN — SODIUM CHLORIDE 8 MG/HR: 9 INJECTION, SOLUTION INTRAVENOUS at 20:22

## 2022-01-01 RX ADMIN — Medication 10 ML: at 10:06

## 2022-01-01 RX ADMIN — SODIUM CHLORIDE 8 MG/HR: 9 INJECTION, SOLUTION INTRAVENOUS at 06:15

## 2022-01-01 RX ADMIN — HYDROMORPHONE HYDROCHLORIDE 1 MG: 2 INJECTION, SOLUTION INTRAMUSCULAR; INTRAVENOUS; SUBCUTANEOUS at 15:06

## 2022-01-01 RX ADMIN — HYDROMORPHONE HYDROCHLORIDE 0.5 MG: 2 INJECTION, SOLUTION INTRAMUSCULAR; INTRAVENOUS; SUBCUTANEOUS at 18:38

## 2022-01-01 RX ADMIN — TIZANIDINE 4 MG: 4 TABLET ORAL at 06:23

## 2022-01-01 RX ADMIN — LORAZEPAM 0.5 MG: 2 INJECTION INTRAMUSCULAR; INTRAVENOUS at 15:06

## 2022-01-01 ASSESSMENT — PAIN - FUNCTIONAL ASSESSMENT
PAIN_FUNCTIONAL_ASSESSMENT: PREVENTS OR INTERFERES WITH ALL ACTIVE AND SOME PASSIVE ACTIVITIES
PAIN_FUNCTIONAL_ASSESSMENT: PREVENTS OR INTERFERES WITH MANY ACTIVE NOT PASSIVE ACTIVITIES

## 2022-01-01 ASSESSMENT — PAIN DESCRIPTION - FREQUENCY
FREQUENCY: CONTINUOUS

## 2022-01-01 ASSESSMENT — PAIN SCALES - GENERAL
PAINLEVEL_OUTOF10: 9
PAINLEVEL_OUTOF10: 6
PAINLEVEL_OUTOF10: 3
PAINLEVEL_OUTOF10: 0
PAINLEVEL_OUTOF10: 4
PAINLEVEL_OUTOF10: 8
PAINLEVEL_OUTOF10: 9
PAINLEVEL_OUTOF10: 6
PAINLEVEL_OUTOF10: 9
PAINLEVEL_OUTOF10: 9

## 2022-01-01 ASSESSMENT — PAIN DESCRIPTION - LOCATION
LOCATION: ABDOMEN

## 2022-01-01 ASSESSMENT — PAIN DESCRIPTION - PAIN TYPE
TYPE: ACUTE PAIN

## 2022-01-01 ASSESSMENT — PAIN DESCRIPTION - DESCRIPTORS
DESCRIPTORS: ACHING

## 2022-01-01 ASSESSMENT — PAIN DESCRIPTION - ONSET
ONSET: ON-GOING
ONSET: ON-GOING

## 2022-01-01 ASSESSMENT — PAIN DESCRIPTION - ORIENTATION
ORIENTATION: MID

## 2022-06-27 PROBLEM — R57.9 SHOCK (HCC): Status: ACTIVE | Noted: 2022-01-01

## 2022-06-27 PROBLEM — G93.41 ACUTE METABOLIC ENCEPHALOPATHY: Status: ACTIVE | Noted: 2022-01-01

## 2022-06-27 PROBLEM — K56.7 ILEUS (HCC): Status: ACTIVE | Noted: 2022-01-01

## 2022-06-27 PROBLEM — E87.20 ACIDOSIS: Status: ACTIVE | Noted: 2022-01-01

## 2022-06-27 NOTE — ED NOTES
Chang.Job- PS General Surgery  Per   RE perf ulcer, ill appearing   returned page @[phone]       [facility]  06/27/22 6755

## 2022-06-27 NOTE — ED NOTES
Unit 1 of trauma blood stopped at this time. No S/Sx of transfusion reaction noted throughout administration.      Ghassan Morales RN  06/27/22 9285

## 2022-06-27 NOTE — ED NOTES
Blood infusion stopped per provider order. Fluids infusing at 30 ml/kg per provider order.      Janell Maynard RN  06/27/22 6758

## 2022-06-27 NOTE — ED NOTES
Ondansetron given per verbal order by Dr. Chary Farrell due to pt verbalizing nausea.      Shilpi Lopez RN  06/27/22 2002

## 2022-06-27 NOTE — ED PROVIDER NOTES
Emergency Department Attending Provider Note  [facility]  6/27/2022     Patient Identification  Anand Grijalva is a 57 y.o. female      Anand Grijalva was evaluated in the Emergency Department for hematemesis. Poor historian she is drowsy found to be hypotensive with coffee-ground emesis and melanic stools. She is moved to our resuscitation bay and systolic pressures 56M.  2 large-bore IVs attempted difficulty obtaining access so central line was placed confirm placement by x-ray. Presumably upper GI bleed no documented history of cirrhosis or varices so started on 2 units uncrossed RBCs. . Although initial history and physical exam information was obtained by ELIJAH/NPP/MD/ (who also dictated a record of this visit), I personally saw the patient and performed a substantive portion of the visit including all aspects of the medical decision making. EKG Interpretation  Normal sinus rhythm rate 100 left axis deviation no conduction abnormalities no diagnostic ischemic changes noted     Central Line    Date/Time: 6/27/2022 9:40 PM  Performed by: Lupe Mitchell MD  Authorized by: Lupe Mitchell MD     Consent:     Consent obtained:  Emergent situation  Pre-procedure details:     Hand hygiene: Hand hygiene performed prior to insertion      Sterile barrier technique: All elements of maximal sterile technique followed      Skin preparation:  2% chlorhexidine  Anesthesia (see MAR for exact dosages):      Anesthesia method:  Local infiltration    Local anesthetic:  Lidocaine 1% w/o epi  Procedure details:     Location:  R internal jugular    Site selection rationale:  Poor femoral access    Patient position:  Trendelenburg    Procedural supplies:  Triple lumen    Landmarks identified: yes      Ultrasound guidance: yes      Number of attempts:  1    Successful placement: yes    Post-procedure details:     Post-procedure:  Dressing applied and line sutured    Assessment:  Blood return through all ports    Patient tolerance of procedure: Tolerated well, no immediate complications          Patient seen and evaluated. Relevant records reviewed. 79-year-old female who presents with initially presenting hemorrhagic shock in the setting of coffee-ground emesis and melenic stool. She was resuscitated with approximately 1 and half units of blood however her H&H has resulted showing a normal hemoglobin. She has multiple derangements consistent with sepsis. She is also significantly tender and guarding so sent for CT abdomen pelvis which shows evidence of suspected ileus but no obvious perforation or active bleed was noted. Treated per sepsis protocol for septic shock and ultimately required Levophed infusion. Unclear etiology at this point. She required ICU admission. I personally saw the patient and independently provided 90 minutes of non-concurrent critical care out of the total shared critical care time provided. This chart was generated in part by using Dragon Dictation system and may contain errors related to that system including errors in grammar, punctuation, and spelling, as well as words and phrases that may be inappropriate. If there are any questions or concerns please feel free to contact the dictating provider for clarification.      Madiha Harper MD  [address] MD  06/28/22 7632

## 2022-06-27 NOTE — ED NOTES
IV placed in R wrist by Kolby Fowler. Port accessed by [facility]. Dr. Kalyan Middleton in room to assess pt and place central line.      Steve Samayoa RN  06/27/22 7091

## 2022-06-27 NOTE — CONSULTS
Consultation Note    Patient Name: Lester Bashir  : 1960  Age: 57 y.o. Admitting Physician: Ashely Desai MD   Date of Admission: 2022  3:26 PM   Primary Care Physician: Alaina Yates MD        Lester Bashir is being seen at the request of Ashely Desai MD for acute abdominal pain and hypotension concerning for septic shock. History of Present Illness:  59-year-old female with a history of subtotal colectomy C. difficile colitis, anxiety depression, peptic ulcer disease, CVA, morbid obesity who presented from nursing facility with with hypotension and coffee-ground emesis and severe abdominal pain. Patient was an extremis upon presentation to the emergency room with blood pressures in the 50s and poor mentation. Found to be in acute renal failure with a creatinine of 3.2 BUN of 35 glucose 262 potassium 5.2 and anion gap of 22. .  Mild AST to 55 total. . bilirubin 1.3 albumin 3. CBC no leukocytosis  platelets 281. Due to hypotension in the emergency room she underwent a urgent right jugular central venous access and was placed in Trendelenburg. Blood pressures slightly improved to systolics in the 71K. She was given 1 unit of blood as her was initial concern for bariatric shock. Hemoglobin resulted at 13.9 and the blood was then held. She did have dark stool and coffee-ground emesis on presentation. No hematochezia. At the time of my visitation the patient is mentating in Trendelenburg. She is able to give some history. Is reported 1-1/2 days of rather severe upper abdominal pain. She does not recall her prior endoscopic history but does tell me she had a colon surgery before in the past and gastroparesis. Her father passed in the last 4 weeks. She was unable to tell me the facility from which she came.       GI History:  Colonoscopy to ileocolonic anastomosis  reported normal recommended 10-year follow-up    Past Medical History:  Past Medical History:   Diagnosis Date    Acid reflux     Acute renal failure ([facility] [address] 2011    resolved    Anemia     Arthritis     causing severe lower back pains    Asthma     Bronchitis     Bursitis     all joints    C. difficile colitis     Diabetes mellitus ([facility] [address]     age 4   [place] Herpes     5    History of blood transfusion     Hypercholesteremia     Hypertension     Migraine     Neuropathy     hips to legs    Obesity     Psychiatric problem     anxiety and depression    Recurrent sinus infections     Renal failure     in past with 8 dialysis treatments    Retinopathy of both eyes     Sleep apnea     can't use a cpap    Ulcer, stomach peptic     x 3    Unspecified cerebral artery occlusion with cerebral infarction     \"silent stroke\"    Unspecified diseases of blood and blood-forming organs     bled for 1 year straight at age 4        Past Surgical History:  Past Surgical History:   Procedure Laterality Date    ABDOMINAL EXPLORATION SURGERY  13    EXPLORATORY LAPAROTOMY LYSIS OF ADHESIONS    ABDOMINAL SURGERY      APPENDECTOMY      CARPAL TUNNEL RELEASE      both hands    CATARACT EXTRACTION W/  INTRAOCULAR LENS IMPLANT  11    right    CATARACT EXTRACTION W/  INTRAOCULAR LENS IMPLANT  2011    left eye     SECTION      COLONOSCOPY      polyps    COLONOSCOPY  2021    Colon complete; made it to anastomotic site    COLONOSCOPY N/A 3/17/2021    COLON W/ANES. [facility], OWN POA. NEG.  COVID, RESULTS ON CHART performed by Lisa Roberts MD at [facility], ESOPHAGUS      ENDOSCOPY, COLON, DIAGNOSTIC      EYE SURGERY      laser, vitrectomy ou, cataract ou    FRACTURE SURGERY      HYSTERECTOMY      partial    JOINT REPLACEMENT      both knees    OTHER SURGICAL HISTORY  8/15/11    Left Shoulder Decompression    OTHER SURGICAL HISTORY      PORT REMOVAL (N/A Chest    PORT SURGERY N/A 12/3/2020    PORT REMOVAL performed by Cherrie Pizano MD at [address] [place] [address] Left 12/31/2020    PORT INSERTION performed by Cher[facility]ano MD at [facility]      right     SUBTOTAL COLECTOMY  6/14/2000    TONSILLECTOMY      TUNNELED CENTRAL VENOUS CATHETER W/ SUBCUTANEOUS PORT Left 12/31/2020    TUNNELED VENOUS PORT PLACEMENT  8/12/11    TUNNELED VENOUS PORT PLACEMENT  3/1/13    Devonte Bustillos        Historical Medications:  Prior to Visit Medications    Medication Sig Taking? Authorizing Provider   dicyclomine (BENTYL) 10 MG capsule Take 10 mg by mouth every 8 hours as needed  Historical Provider, MD   polycarbophil (FIBERCON) 625 MG tablet Take 625 mg by mouth daily  Historical Provider, MD   gabapentin (NEURONTIN) 100 MG capsule Take 200 mg by mouth 2 times daily. Historical Provider, MD   ketorolac (ACULAR) 0.5 % ophthalmic solution Place 1 drop into both eyes daily  Historical Provider, MD   ketotifen (ZADITOR) 0.025 % ophthalmic solution Place 1 drop into both eyes 2 times daily  Historical Provider, MD   insulin aspart (NOVOLOG PENFILL) 100 UNIT/ML injection cartridge Inject 1 Units into the skin as needed for High Blood Sugar Insulin Aspart Sliding Scale:  160-199 give 2 units  200-249 give 4 units  250-299 give 6 units  300-349 give 8 units  350-399 give 12 units  400+ call MD  Historical Provider, MD   miconazole (MICOTIN) 2 % powder Apply 1 Act topically 2 times daily Apply to under bilat breast every day and night shift for redness. Before applying, cleanse with NS and pat dry  Historical Provider, MD   tiZANidine (ZANAFLEX) 4 MG tablet Take 4 mg by mouth every 6 hours as needed  Historical Provider, MD   ondansetron (ZOFRAN ODT) 4 MG disintegrating tablet Take 1-2 tablets by mouth every 12 hours as needed for Nausea May Sub regular tablet (non-ODT) if insurance does not cover ODT.   Patient taking differently: Take 4 mg by mouth every 6 hours as needed for Nausea   Sahil Ross, APRN - CNP insulin glargine (BASAGLAR KWIKPEN) 100 UNIT/ML injection pen Inject 28 Units into the skin 2 times daily Before breakfast and dinner  Patient taking differently: Inject 35 Units into the skin every morning (before breakfast)   Isaak Kohler MD   insulin glargine (BASAGLAR KWIKPEN) 100 UNIT/ML injection pen Inject into the skin nightly  Historical Provider, MD   diazepam (VALIUM) 10 MG tablet take 1 tablet by oral route  every bedtime  Historical Provider, MD   atorvastatin (LIPITOR) 40 MG tablet Take 40 mg by mouth  Historical Provider, MD   Misc. Devices [facility]) MISC 1 each by Does not apply route daily  Jordy Lezama   Cholecalciferol (VITAMIN D3) 2000 UNITS CAPS Take  by mouth daily. Historical Provider, MD   Cyanocobalamin (VITAMIN B 12 PO) Take 500 mcg by mouth daily   Historical Provider, MD   sertraline (ZOLOFT) 100 MG tablet Take 200 mg by mouth nightly. Historical Provider, MD   lactulose (CHRONULAC) 10 GM/15ML solution Take 20 g by mouth daily as needed   Historical Provider, MD   montelukast (SINGULAIR) 10 MG tablet Take 10 mg by mouth nightly.   Historical Provider, MD        Hospital Medications:  Current Facility-Administered Medications: 0.9 % sodium chloride IV bolus 4,083 mL, 30 mL/kg, IntraVENous, Once  piperacillin-tazobactam (ZOSYN) 3,375 mg in dextrose 5 % 50 mL IVPB (mini-bag), 3,375 mg, IntraVENous, Once     Social History:   Social History     Tobacco History     Smoking Status  Never Smoker    Smokeless Tobacco Use  Never Used          Alcohol History     Alcohol Use Status  No          Drug Use     Drug Use Status  No          Sexual Activity     Sexually Active  Never                 Family History:  Family History   Problem Relation Age of Onset    Diabetes Father     Heart Disease Father         mi    High Blood Pressure Father     High Cholesterol Father     Diabetes Paternal Aunt     Heart Disease Paternal Aunt     High Blood Pressure Paternal Aunt     High Cholesterol Paternal Aunt     Cancer Paternal Aunt         ovarian    Mult Sclerosis Paternal Aunt     Dementia Mother     High Blood Pressure Mother     Heart Disease Maternal Grandmother     Heart Disease Paternal Grandmother     Diabetes Paternal Uncle     Heart Disease Paternal Uncle     High Blood Pressure Paternal Uncle     High Cholesterol Paternal Uncle     Cancer Paternal Uncle     Cancer Maternal Grandfather         lung        Allergies: Allergies   Allergen Reactions    Adhesive Tape Dermatitis    Ambien [Zolpidem]      Made me wild    Codeine      Can take a little just not a lot at a time    Flurosyn [Fluocinolone Acetonide]      \"flurosceen eye dye\" severe HA, fainting    Morphine      Went bezerk    Tegaderm Ag Mesh [Silver] Dermatitis     All types of tegaderm and adhesives        ROS:   Limited review of systems due to patient's clinical condition as she is mentation has not normal.  This is secondary to hypotension and extremis. Denies active chest pain. Has rather severe abdominal pain. Does not recall fever. Physical Exam:  Vital Signs:   Vitals:    06/27/22 1604   BP: (!) 81/44   Pulse: 99   Resp:    Temp:    SpO2:        General: critically ill appearing  HEENT: Sclera anicteric, mucosal membranes dry  Cardiovascular: increased rate and rhythm.  +murmurs. Respiratory: Respirations increased   GI: Abdomen distended, severe pain to light palpation  Rectal: Deferred, already done in ED with dark stools   Musculoskeletal: pitting edema of the lower legs. Neurological: Gross memory appears intact. slightly somnolent     Recent labs and imaging reviewed. Assessment:  Severe abdominal pain  History gastroparesis  History of subtotal colectomy  Peptic ulcer disease  Acute abdominal pain  Renal failure  Elevated liver enzymes    77-year-old female who I was asked to come see as she was in extremis with hypotension and initial concern for septic shock.   Hemoglobin was higher than expected the blood transfusion has been stopped. She appears to be in better condition than in presentation as she was profoundly hypotensive however blood pressure and clinical status still tenuous. She has rather severe tenderness to even light palpation in her abdomen with distention. CT scan is currently planned we will await those results. Overall, clinical presentation is concerning for ischemia or perforation. She did have coffee-ground emesis and dark stools I suspect her hemoglobin will continue to fall as she is volume contracted and renal failure with an acidosis. Although I doubt this is simply a UGIB form peptic ulcer. No history of liver disease or blood thinners. Plan:  1. CT, lactic acid and blood gas pending  2. Suspect she will need a surgery consult. Manav Hewitt MD    [facility]    [phone].  Also available via [employer]

## 2022-06-27 NOTE — ED PROVIDER NOTES
[facility] Emergency Department    CHIEF COMPLAINT  Hematemesis (From [facility] since [place] today. Producing coffee ground emesis.)      SHARED SERVICE VISIT  I have seen and evaluated this patient with my supervising physician, Dr. Gregory Gomez. HISTORY OF PRESENT ILLNESS  Adrianna Schaefer is a 57 y.o. female is brought to the ED via EMS due to coffee-ground emesis. EMS states that patient was found unresponsive in a puddle of coffee-ground emesis. EMS states that patient responded only to pain. Unable to get a thorough history from patient due to patient being responsive only to pain. Patient did however moan with palpation to the abdomen. Patient was hypertensive and tachycardic when she presented to the ED. No other complaints, modifying factors or associated symptoms. Nursing notes reviewed.    Past Medical History:   Diagnosis Date    Acid reflux     Acute renal failure ([address] 1/2011    resolved    Anemia 2011    Arthritis     causing severe lower back pains    Asthma     Bronchitis     Bursitis     all joints    C. difficile colitis     Diabetes mellitus ([facility]     age 4   [facility] Her[facility]     5    History of blood transfusion     Hypercholesteremia     Hypertension     Migraine     Neuropathy     hips to legs    Obesity     Psychiatric problem     anxiety and depression    Recurrent sinus infections     Renal failure     in past with 8 dialysis treatments    Retinopathy of both eyes     Sleep apnea     can't use a cpap    Ulcer, stomach peptic     x 3    Unspecified cerebral artery occlusion with cerebral infarction     \"silent stroke\"    Unspecified diseases of blood and blood-forming organs     bled for 1 year straight at age 4     Past Surgical History:   Procedure Laterality Date    ABDOMINAL EXPLORATION SURGERY  1/25/13    EXPLORATORY LAPAROTOMY LYSIS OF ADHESIONS    ABDOMINAL SURGERY      APPENDECTOMY      CARPAL TUNNEL RELEASE      both hands    CATARACT EXTRACTION W/  INTRAOCULAR LENS IMPLANT  11    right    CATARACT EXTRACTION W/  INTRAOCULAR LENS IMPLANT  2011    left eye     SECTION      COLONOSCOPY      polyps    COLONOSCOPY  2021    Colon complete; made it to anastomotic site    COLONOSCOPY N/A 3/17/2021    COLON W/ANES. [facility], OWN POA. NEG.  COVID, RESULTS ON CHART performed by Rafael Rice MD at [place] [address], ESOPHAGUS      ENDOSCOPY, COLON, DIAGNOSTIC      EYE SURGERY      laser, vitrectomy ou, cataract ou    FRACTURE SURGERY      HYSTERECTOMY      partial    JOINT REPLACEMENT      both knees    OTHER SURGICAL HISTORY  8/15/11    Left Shoulder Decompression    OTHER SURGICAL HISTORY      PORT REMOVAL (N/A Chest    PORT SURGERY N/A 12/3/2020    PORT REMOVAL performed by Isrrael Coello MD at [address] [place] [address] Left 2020    PORT INSERTION performed by [facility]rael [facility] MD at [facility]      right     SUBTOTAL COLECTOMY  2000    TONSILLECTOMY      TUNNELED CENTRAL VENOUS CATHETER W/ SUBCUTANEOUS PORT Left 2020    TUNNELED VENOUS PORT PLACEMENT  11    TUNNELED VENOUS PORT PLACEMENT  3/1/13    Power Tressa     Family History   Problem Relation Age of Onset    Diabetes Father     Heart Disease Father         mi    High Blood Pressure Father     High Cholesterol Father     Diabetes Paternal Aunt     Heart Disease Paternal Aunt     High Blood Pressure Paternal Aunt     High Cholesterol Paternal Aunt     Cancer Paternal Aunt         ovarian    Mult Sclerosis Paternal Aunt     Dementia Mother     High Blood Pressure Mother     Heart Disease Maternal Grandmother     Heart Disease Paternal Grandmother     Diabetes Paternal Uncle     Heart Disease Paternal Uncle     High Blood Pressure Paternal Uncle     High Cholesterol Paternal Uncle     Cancer Paternal Gustavo Police Cancer Maternal Grandfather         lung     Social History     Socioeconomic History    Marital status:      Spouse name: Not on file    Number of children: Not on file    Years of education: Not on file    Highest education level: Not on file   Occupational History    Not on file   Tobacco Use    Smoking status: Never Smoker    Smokeless tobacco: Never Used   Vaping Use    Vaping Use: Never used   Substance and Sexual Activity    Alcohol use: No    Drug use: No    Sexual activity: Never   Other Topics Concern    Not on file   Social History Narrative    Not on file     Social Determinants of Health     Financial Resource Strain:     Difficulty of Paying Living Expenses: Not on file   Food Insecurity:     Worried About Running Out of Food in the Last Year: Not on file    Tressa of Food in the Last Year: Not on file   Transportation Needs:     Lack of Transportation (Medical): Not on file    Lack of Transportation (Non-Medical):  Not on file   Physical Activity:     Days of Exercise per Week: Not on file    Minutes of Exercise per Session: Not on file   Stress:     Feeling of Stress : Not on file   Social Connections:     Frequency of Communication with Friends and Family: Not on file    Frequency of Social Gatherings with Friends and Family: Not on file    Attends [employer] Services: Not on file    Active Member of [address] [place] [address] [employer] or Organizations: Not on file    Attends Club or Organization Meetings: Not on file    Marital Status: Not on file   Intimate Partner Violence:     Fear of Current or Ex-Partner: Not on file    Emotionally Abused: Not on file    Physically Abused: Not on file    Sexually Abused: Not on file   Housing Stability:     Unable to Pay for Housing in the Last Year: Not on file    Number of [place] in the Last Year: Not on file    Unstable Housing in the Last Year: Not on file     Current Facility-Administered Medications   Medication Dose Route Frequency Provider Last Rate Last Admin    0.9 % sodium chloride bolus  1,000 mL IntraVENous Once Hailey Collins PA-C 495.9 mL/hr at 06/27/22 1541 1,000 mL at 06/27/22 1541    pantoprazole (PROTONIX) injection 40 mg  40 mg IntraVENous Once Hailey Collins PA-C        0.9 % sodium chloride IV bolus 4,083 mL  30 mL/kg IntraVENous Once Jeff Dillon MD         Current Outpatient Medications   Medication Sig Dispense Refill    dicyclomine (BENTYL) 10 MG capsule Take 10 mg by mouth every 8 hours as needed      polycarbophil (FIBERCON) 625 MG tablet Take 625 mg by mouth daily      gabapentin (NEURONTIN) 100 MG capsule Take 200 mg by mouth 2 times daily.  ketorolac (ACULAR) 0.5 % ophthalmic solution Place 1 drop into both eyes daily      ketotifen (ZADITOR) 0.025 % ophthalmic solution Place 1 drop into both eyes 2 times daily      insulin aspart (NOVOLOG PENFILL) 100 UNIT/ML injection cartridge Inject 1 Units into the skin as needed for High Blood Sugar Insulin Aspart Sliding Scale:  160-199 give 2 units  200-249 give 4 units  250-299 give 6 units  300-349 give 8 units  350-399 give 12 units  400+ call MD      miconazole (MICOTIN) 2 % powder Apply 1 Act topically 2 times daily Apply to under bilat breast every day and night shift for redness. Before applying, cleanse with NS and pat dry      tiZANidine (ZANAFLEX) 4 MG tablet Take 4 mg by mouth every 6 hours as needed      ondansetron (ZOFRAN ODT) 4 MG disintegrating tablet Take 1-2 tablets by mouth every 12 hours as needed for Nausea May Sub regular tablet (non-ODT) if insurance does not cover ODT.  (Patient taking differently: Take 4 mg by mouth every 6 hours as needed for Nausea ) 12 tablet 0    insulin glargine (BASAGLAR KWIKPEN) 100 UNIT/ML injection pen Inject 28 Units into the skin 2 times daily Before breakfast and dinner (Patient taking differently: Inject 35 Units into the skin every morning (before breakfast) ) 5 pen 3    insulin glargine (BASAGLAR KWIKPEN) 100 UNIT/ML injection pen Inject into the skin nightly      diazepam (VALIUM) 10 MG tablet take 1 tablet by oral route  every bedtime      atorvastatin (LIPITOR) 40 MG tablet Take 40 mg by mouth      Misc. Devices (WALKER) MISC 1 each by Does not apply route daily 1 each 0    Cholecalciferol (VITAMIN D3) 2000 UNITS CAPS Take  by mouth daily.  Cyanocobalamin (VITAMIN B 12 PO) Take 500 mcg by mouth daily       sertraline (ZOLOFT) 100 MG tablet Take 200 mg by mouth nightly.  lactulose (CHRONULAC) 10 GM/15ML solution Take 20 g by mouth daily as needed       montelukast (SINGULAIR) 10 MG tablet Take 10 mg by mouth nightly. Allergies   Allergen Reactions    Adhesive Tape Dermatitis    Ambien [Zolpidem]      Made me wild    Codeine      Can take a little just not a lot at a time    Flurosyn [Fluocinolone Acetonide]      \"flurosceen eye dye\" severe HA, fainting    Morphine      Went bezerk    Tegaderm Ag Mesh [Silver] Dermatitis     All types of tegaderm and adhesives       REVIEW OF SYSTEMS  10 systems reviewed, pertinent positives per HPI otherwise noted to be negative    PHYSICAL EXAM  BP (!) 81/44   Pulse 99   Temp 99.2 °F (37.3 °C)   Resp 12   Wt 300 lb (136.1 kg)   SpO2 96%   BMI 51.49 kg/m²   GENERAL APPEARANCE: Oriented to pain. Pale in color. In acute distress. Toxic in appearance  HEAD: Normocephalic. Atraumatic. EYES: PERRL. EOM's grossly intact. ENT: Mucous membranes are dry and pale. NECK: Supple. HEART: RRR. No murmurs. LUNGS: Respirations labored. CTAB. Good air exchange. ABDOMEN: Tenderness palpation of all quadrants. Firm and distended. No guarding or rebound. No masses. No organomegaly. EXTREMITIES: No peripheral edema. Moves all extremities equally. All extremities neurovascularly intact. SKIN: Warm and dry. No acute rashes. NEUROLOGICAL: Alert and oriented. CN's 2-12 intact. No gross facial drooping. Strength 5/5, sensation intact. PSYCHIATRIC: Normal mood and affect. RADIOLOGY  XR CHEST PORTABLE    Result Date: 6/27/2022  EXAMINATION: ONE XRAY VIEW OF THE CHEST 6/27/2022 4:16 pm COMPARISON: 12/31/2020 HISTORY: ORDERING SYSTEM PROVIDED HISTORY: Central line placement confirmation TECHNOLOGIST PROVIDED HISTORY: Reason for exam:->Central line placement confirmation Reason for Exam: Central line placement confirmation FINDINGS: Left jugular central venous catheter terminates in the superior right atrium. Right jugular central venous catheter terminates in the SVC. The lungs are without acute focal process. There is no effusion or pneumothorax. The cardiomediastinal silhouette is stable. The osseous structures are stable. No acute process. Right jugular central venous catheter terminates in the SVC Left central venous catheter terminates in the superior right atrium No acute cardiopulmonary process       ED COURSE  76-year-old female transported to the ED via EMS after being found unresponsive in a puddle of coffee-ground emesis. Patient arrived to the ED she was responsive to verbal commands and pain but not spontaneous eye movements. Mucous membranes are pale she was cyanotic. Was hypotensive and tachycardic. Breath sounds clear and equal bilaterally. No wheezing, rhonchi or rales heard on auscultation of the lungs. No murmurs, rubs, or gallops heard on heart auscultation. Tenderness to palpation diffusely across the abdomen. Abdomen was distended and firm. EKG performed in the ED was interpreted by Dr. Windy Arshad. Match across were ordered. She initially received 1 L of fluid and 1-1/2 units of blood. Dr. Windy Arshad inserted a central line right side. POCT glucose was 163 on arrival.  BNP was 11,540. Calcitonin was over 100. No abnormalities noted on urinalysis. There was a decrease in absolute lymphocytes noted on CBC as well as increase in MCV however there were no other abnormalities noted on CBC. Lactate was 3.8. Potassium was elevated however specimen hemolyzed. CO2 was 12, glucose was 264, but was 55, creatinine was 3.2, GFR was 15. Albumin was decreased at 3.0, albumin globulin ratio is decreased at 0.8, AST was elevated at 55. Venous pH was 7.22 PCO2 venous was 35.2. PO2 is 6.0. Bicarbonate was 14.1. Anion gap was 22. Pro time was 14.4 and INR was 1. 13. Troponin was 0.05. Chest x-ray shows no acute processes. Right jugular central venous catheter terminates in the SVC. No acute cardiopulmonary processes. Left central venous catheter terminates in the superior right atrium. CT abdomen pelvis without contrast shows layering hyperdense material noted in the gastric fundus. This material could represent impart layering blood products, but insinuation is greater than would be expected. Other considerations include hyperdense ingested material or retained oral contrast that the patient's received any. Diffuse gaseous and fluid-filled dilation of the small bowel and colon without transition point, consistent with an ileus. Fat stranding is noted along multiple small bowel and colonic loops, and this may represent reactive ileus related to enterocolitis. Liquid stool throughout the colon suggestive of diarrheal illness. Patient received 40 mg Protonix, 4083 mL normal saline, Zosyn, and Zofran for nausea. Triage vitals BP 48/25, pulse 111, respirations at 32, temperature 99.2 °F, SPO2 96% on room air. A discussion was had with Dr. Pallavi Gifford regarding admission. Patient will be admitted to the hospital today. CRITICAL CARE TIME  30 minutes of critical care time spent not including separately billable procedures. MDM  Results for orders placed or performed during the hospital encounter of 06/27/22   Culture, Blood 1    Specimen: Blood   Result Value Ref Range    Blood Culture, Routine No Growth after 4 days of incubation.     Culture, Blood 2    Specimen: Blood   Result Value Ref Range    Organism Basophils Absolute 0.0 0.0 - 0.2 K/uL   Lactate, Sepsis   Result Value Ref Range    Lactic Acid, Sepsis 3.8 (H) 0.4 - 1.9 mmol/L   Procalcitonin   Result Value Ref Range    Procalcitonin >100.00 (H) 0.00 - 0.15 ng/mL   Blood Gas, Venous   Result Value Ref Range    pH, Telly 7.220 (L) 7.350 - 7.450    pCO2, Telly 35.2 (L) 40.0 - 50.0 mmHg    pO2, Telly 60.0 (H) 25.0 - 40.0 mmHg    HCO3, Venous 14.1 (L) 23.0 - 29.0 mmol/L    Base Excess, Telly -12.6 (L) -3.0 - 3.0 mmol/L    O2 Sat, Telly 88 Not Established %    Carboxyhemoglobin 1.5 0.0 - 1.5 %    MetHgb, Telly 0.5 <1.5 %    TC02 (Calc), Telly 15 Not Established mmol/L    O2 Therapy Unknown    Lipase   Result Value Ref Range    Lipase 21.0 13.0 - 60.0 U/L   Troponin   Result Value Ref Range    Troponin 0.05 (H) <0.01 ng/mL   Procalcitonin   Result Value Ref Range    Procalcitonin 92.24 (H) 0.00 - 0.15 ng/mL   Hemoglobin and Hematocrit   Result Value Ref Range    Hemoglobin 12.6 12.0 - 16.0 g/dL    Hematocrit 38.6 36.0 - 48.0 %   Urinalysis with Reflex to Culture    Specimen: Urine   Result Value Ref Range    Color, UA Yellow Straw/Yellow    Clarity, UA Clear Clear    Glucose, Ur Negative Negative mg/dL    Bilirubin Urine Negative Negative    Ketones, Urine Negative Negative mg/dL    Specific Gravity, UA 1.015 1.005 - 1.030    Blood, Urine Negative Negative    pH, UA 6.0 5.0 - 8.0    Protein, UA Negative Negative mg/dL    Urobilinogen, Urine 0.2 <2.0 E.U./dL    Nitrite, Urine Negative Negative    Leukocyte Esterase, Urine Negative Negative    Microscopic Examination Not Indicated     Urine Type NotGiven     Urine Reflex to Culture Not Indicated    Brain Natriuretic Peptide   Result Value Ref Range    Pro-BNP 11,540 (H) 0 - 124 pg/mL   Hemoglobin and Hematocrit   Result Value Ref Range    Hemoglobin 12.9 12.0 - 16.0 g/dL    Hematocrit 40.1 36.0 - 48.0 %   Lactic Acid   Result Value Ref Range    Lactic Acid 4.0 (HH) 0.4 - 2.0 mmol/L   POCT Glucose   Result Value Ref Range    POC Glucose 163 (H) 70 - 99 mg/dl    Performed on ACCU-CHEK    POCT Glucose   Result Value Ref Range    POC Glucose 247 (H) 70 - 99 mg/dl    Performed on ACCU-CHEK    EKG 12 Lead   Result Value Ref Range    Ventricular Rate 97 BPM    Atrial Rate 97 BPM    P-R Interval 152 ms    QRS Duration 88 ms    Q-T Interval 348 ms    QTc Calculation (Bazett) 441 ms    P Axis 54 degrees    R Axis -24 degrees    T Axis 50 degrees    Diagnosis       Normal sinus rhythmPossible Lateral infarct , age undetermined  vs lead malpositionAbnormal ECGConfirmed by Shahrzad Palomino MD, Zana Schilder ([id number]) on 6/28/2022 5:37:47 PM   TYPE AND SCREEN   Result Value Ref Range    ABO/Rh A NEG     Antibody Screen NEG    PREPARE RBC (CROSSMATCH), 1 Units   Result Value Ref Range    Product Code Blood Bank [id number]     Description Blood Bank Red Blood Cells, Apheresis, Leuko-reduced     Unit Number [id number]     Dispense Status Blood Bank released     Product Code Blood Bank [id number]     Description Blood Bank Red Blood Cells, Apheresis, Leuko-reduced     Unit Number [id number]     Dispense Status Blood Bank transfused     Product Code Blood Bank [id number]     Description Blood Bank Red Blood Cells, Leuko-reduced     Unit Number [id number]     Dispense Status Blood Bank transfused        Estimate there is HIGH risk for ILEUS, as well as there is evidence for sepsis, therefore I considered the decision to admit reasonable. I estimate there is LOW risk for ACUTE APPENDICITIS, BOWEL OBSTRUCTION, CHOLECYSTITIS, DIVERTICULITIS, INCARCERATED HERNIA, PANCREATITIS, or PERFORATED BOWEL or ULCER,. Also, there is no evidence or peritonitis,or toxicity. Geetha Morse and I have discussed the diagnosis and risks, and we agree with admission to the hospital.    FINAL Impression    1. Septic shock ([address]    2.  Gastrointestinal hemorrhage, unspecified gastrointestinal hemorrhage type        Blood pressure (!) 54/32, pulse (!) 0, temperature 99.8 °F (37.7 °C), temperature source Bladder, resp. rate (!) 0, height 5' 4.02\" (1.626 m), weight 267 lb 6.7 oz (121.3 kg), SpO2 (!) 84 %.   DISPOSITION  Patient was admitted to the hospital.         Demetrice Kaufman PA-C  07/02/22 [id number] [facility], [place]  07/02/22 7235

## 2022-06-27 NOTE — ED NOTES
Pt's bed remains trendelenburged during central line placement     Devendra Reaves RN  06/27/22 9789

## 2022-06-27 NOTE — ED NOTES
Pt continues resting in bed at this time. Improvement of BP noted at this time.      Valeri Escoto RN  06/27/22 2068

## 2022-06-28 NOTE — FLOWSHEET NOTE
Rounding, patient shared that she \"feels pain a lot when they move me and I get they have to. Darnell Delmer Darnell Delmer \" and teared up, sharing about the loss of her father, her mother who is declining in hospice and her love for her two brothers, Oj Omalley and Drake. Prayed with patient as requested for her family and health. Brought image of Mehdi to her as requested and placed on wall as a comfort for her. Lesley Coello    Thank you for consulting Spiritual Care    If you need a  for emotional, spiritual or comfort care,   -or- assistance with [address] [place] [employer] or Living Will forms,  please dial \"[phone]\" and ask for the [address] [place] [address] Road on-call to be paged.     You may also call us directly:  [phone] Berke  [phone]) [place]  [phone]) Outpatient

## 2022-06-28 NOTE — CONSULTS
Gastroenterology Consult Note    Patient:   Jeovanny Maloney   YOB: 1960   Facility:   [address] [place] [address]   Referring/PCP: Liliane Alex MD  Date:     6/27/2022  Consultant:   Humberto Matos MD, MD    Subjective: This 57 y.o. female was admitted 6/27/2022 with a diagnosis of \"Shock [facility]) [R57.9]\" and is seen in consultation regarding \"abd pain\". Information was obtained from interview of  the patient, examination of the patient, and review of records. I did  update the past medical, surgical, social and / or family history. abd pain for yrs worsening mod assoc w diarrhea    Current status  Present  Diet Order: Diet NPO Exceptions are: Ice Chips and she is tolerating diet. Recently, she has experienced moderate, maximum 6/10 generalized abdominal  Pain and she has required Intravenous narcotic analgesics. The patient has also experienced no constipation, fever, hematochezia and melena      Prior to Admission medications    Medication Sig Start Date End Date Taking? Authorizing Provider   dicyclomine (BENTYL) 10 MG capsule Take 10 mg by mouth every 8 hours as needed    Historical Provider, MD   polycarbophil (FIBERCON) 625 MG tablet Take 625 mg by mouth daily    Historical Provider, MD   gabapentin (NEURONTIN) 100 MG capsule Take 200 mg by mouth 2 times daily.     Historical Provider, MD   ketorolac (ACULAR) 0.5 % ophthalmic solution Place 1 drop into both eyes daily    Historical Provider, MD   ketotifen (ZADITOR) 0.025 % ophthalmic solution Place 1 drop into both eyes 2 times daily    Historical Provider, MD   insulin aspart (NOVOLOG PENFILL) 100 UNIT/ML injection cartridge Inject 1 Units into the skin as needed for High Blood Sugar Insulin Aspart Sliding Scale:  160-199 give 2 units  200-249 give 4 units  250-299 give 6 units  300-349 give 8 units  350-399 give 12 units  400+ call MD    Historical Provider, MD   miconazole (MICOTIN) 2 % powder Apply 1 Act topically 2 times daily Apply to under bilat breast every day and night shift for redness. Before applying, cleanse with NS and pat dry    Historical Provider, MD   tiZANidine (ZANAFLEX) 4 MG tablet Take 4 mg by mouth every 6 hours as needed    Historical Provider, MD   ondansetron (ZOFRAN ODT) 4 MG disintegrating tablet Take 1-2 tablets by mouth every 12 hours as needed for Nausea May Sub regular tablet (non-ODT) if insurance does not cover ODT. Patient taking differently: Take 4 mg by mouth every 6 hours as needed for Nausea  8/27/20   CHIQUITA Rojas - CNP   insulin glargine [facility]) 100 UNIT/ML injection pen Inject 28 Units into the skin 2 times daily Before breakfast and dinner  Patient taking differently: Inject 35 Units into the skin every morning (before breakfast)  8/9/20   Karin Pantoja MD   insulin glargine (BASAGLAR KWIKPEN) 100 UNIT/ML injection pen Inject into the skin nightly    Historical Provider, MD   diazepam (VALIUM) 10 MG tablet take 1 tablet by oral route  every bedtime    Historical Provider, MD   atorvastatin (LIPITOR) 40 MG tablet Take 40 mg by mouth    Historical Provider, MD   Misc. Devices [facility]) MISC 1 each by Does not apply route daily 10/9/17   JAKE Spencer   Cholecalciferol (VITAMIN D3) 2000 UNITS CAPS Take  by mouth daily. Historical Provider, MD   Cyanocobalamin (VITAMIN B 12 PO) Take 500 mcg by mouth daily     Historical Provider, MD   sertraline (ZOLOFT) 100 MG tablet Take 200 mg by mouth nightly. Historical Provider, MD   lactulose (CHRONULAC) 10 GM/15ML solution Take 20 g by mouth daily as needed     Historical Provider, MD   montelukast (SINGULAIR) 10 MG tablet Take 10 mg by mouth nightly.     Historical Provider, MD      Scheduled Medications:    [START ON 6/28/2022] insulin lispro  0-6 Units SubCUTAneous TID WC    insulin lispro  0-3 Units SubCUTAneous Nightly    sodium chloride flush  5-40 mL IntraVENous 2 times per day    [START ON 6/28/2022] piperacillin-tazobactam  3,375 mg IntraVENous Q8H     Infusions:    norepinephrine 5 mcg/min (06/27/22 1902)    pantoprozole (PROTONIX) infusion 8 mg/hr (06/27/22 2022)    dextrose      sodium chloride      sodium chloride 100 mL/hr at 06/27/22 2123     PRN Medications: glucose, dextrose bolus **OR** dextrose bolus, glucagon (rDNA), dextrose, sodium chloride flush, sodium chloride, ondansetron **OR** ondansetron, acetaminophen **OR** acetaminophen, tiZANidine  Allergies:    Allergies   Allergen Reactions    Adhesive Tape Dermatitis    Ambien [Zolpidem]      Made me wild    Codeine      Can take a little just not a lot at a time    Flurosyn [Fluocinolone Acetonide]      \"flurosceen eye dye\" severe HA, fainting    Morphine      Went bezerk    Tegaderm Ag Mesh [Silver] Dermatitis     All types of tegaderm and adhesives       Past Medical History:   Diagnosis Date    Acid reflux     Acute renal failure ([facility] 1/2011    resolved    Anemia 2011    Arthritis     causing severe lower back pains    Asthma     Bronchitis     Bursitis     all joints    C. difficile colitis     Diabetes mellitus ([facility]     age 4   Zena Saul Herpes     5    History of blood transfusion     Hypercholesteremia     Hypertension     Migraine     Neuropathy     hips to legs    Obesity     Psychiatric problem     anxiety and depression    Recurrent sinus infections     Renal failure     in past with 8 dialysis treatments    Retinopathy of both eyes     Sleep apnea     can't use a cpap    Ulcer, stomach peptic     x 3    Unspecified cerebral artery occlusion with cerebral infarction     \"silent stroke\"    Unspecified diseases of blood and blood-forming organs     bled for 1 year straight at age 4     Past Surgical History:   Procedure Laterality Date    ABDOMINAL EXPLORATION SURGERY  1/25/13    EXPLORATORY LAPAROTOMY LYSIS OF ADHESIONS    ABDOMINAL SURGERY      APPENDECTOMY      CARPAL TUNNEL RELEASE      both hands    CATARACT EXTRACTION W/  INTRAOCULAR LENS IMPLANT  11    right    CATARACT EXTRACTION W/  INTRAOCULAR LENS IMPLANT  2011    left eye     SECTION      COLONOSCOPY      polyps    COLONOSCOPY  2021    Colon complete; made it to anastomotic site    COLONOSCOPY N/A 3/17/2021    COLON W/CASEY. LAURA PT, OWN POA. NEG.  COVID, RESULTS ON CHART performed by Rafael Rice MD at [facility], ESOPHAGUS      ENDOSCOPY, COLON, DIAGNOSTIC      EYE SURGERY      laser, vitrectomy ou, cataract ou    FRACTURE SURGERY      HYSTERECTOMY      partial    JOINT REPLACEMENT      both knees    OTHER SURGICAL HISTORY  8/15/11    Left Shoulder Decompression    OTHER SURGICAL HISTORY      PORT REMOVAL (N/A Chest    PORT SURGERY N/A 12/3/2020    PORT REMOVAL performed by Isrrael Coello MD at [facility] Left 2020    PORT INSERTION performed by Isrrael Coello MD at [facility]      right     SUBTOTAL COLECTOMY  2000    TONSILLECTOMY      TUNNELED CENTRAL VENOUS CATHETER W/ SUBCUTANEOUS PORT Left 2020    TUNNELED VENOUS PORT PLACEMENT  11    TUNNELED VENOUS PORT PLACEMENT  3/1/13    [facility]       Social:   Social History     Tobacco Use    Smoking status: Never Smoker    Smokeless tobacco: Never Used   Substance Use Topics    Alcohol use: No     Family:   Family History   Problem Relation Age of Onset    Diabetes Father     Heart Disease Father         mi    High Blood Pressure Father     High Cholesterol Father     Diabetes Paternal Aunt     Heart Disease Paternal Aunt     High Blood Pressure Paternal Aunt     High Cholesterol Paternal Aunt     Cancer Paternal Aunt         ovarian    Mult Sclerosis Paternal Aunt     Dementia Mother     High Blood Pressure Mother     Heart Disease Maternal Grandmother     Heart Disease Paternal Grandmother     Diabetes Paternal Gustavo [employer] Heart Disease Paternal Uncle     High Blood Pressure Paternal Uncle     High Cholesterol Paternal Uncle     Cancer Paternal Uncle     Cancer Maternal Grandfather         lung       ROS: Pertinent items are noted in HPI.     Objective:   Vital Signs:  Temp (24hrs), Av.9 °F (37.7 °C), Min:99.2 °F (37.3 °C), Max:100.5 °F (11.7 °C)     Systolic (70WSS), TQY:22 , Min:48 , YTC:609      Diastolic (48AYX), OSB:49, Min:25, Max:100     Pulse  Av  Min: 99  Max: 111  BP (!) 81/51   Pulse (!) 108   Temp (!) 100.5 °F (38.1 °C) (Bladder)   Resp 18   Ht 5' 4.02\" (1.626 m)   Wt 267 lb 6.7 oz (121.3 kg)   SpO2 93%   BMI 45.88 kg/m²      Physical Exam:   BP (!) 81/51   Pulse (!) 108   Temp (!) 100.5 °F (38.1 °C) (Bladder)   Resp 18   Ht 5' 4.02\" (1.626 m)   Wt 267 lb 6.7 oz (121.3 kg)   SpO2 93%   BMI 45.88 kg/m²   General appearance: alert, appears stated age and cooperative  Lungs: clear to auscultation bilaterally  Chest wall: no tenderness  Heart: regular rate and rhythm, S1, S2 normal, no murmur, click, rub or gallop  Abdomen: abnormal findings:  tenderness marked in the entire abdomen  Extremities: extremities normal, atraumatic, no cyanosis or edema  Skin: Skin color, texture, turgor normal. No rashes or lesions  Neurologic: Grossly normal    Lab and Imaging Review   Recent Labs     22  1542 22  1752   WBC 8.8  --    HGB 13.9 12.6   .4*  --      --    INR 1.13  --      --    K 5.2*  --      --    CO2 12*  --    BUN 55*  --    CREATININE 3.2*  --    GLUCOSE 264*  --    CALCIUM 9.8  --    PROT 6.6  --    LABALBU 3.0*  --    AST 55*  --    ALT 39  --    ALKPHOS 65  --    BILITOT 0.3  --    LIPASE 21.0  --        Assessment:     Patient Active Problem List    Diagnosis Date Noted    Diabetic k[facility]acidosis with coma ([facility]     Non-traumatic rhabdomyolysis     NSTEMI (non-ST elevated myocardial infarction) ([facility] 2017    Shock ([facility] 2022    Acidosis 06/27/2022    Ileus ([address] 06/27/2022    Acute metabolic encephalopathy 49/25/7162    Morbid obesity due to excess calories ([address]     Vomiting 02/01/2011    Diabetes mellitus ([address]     Essential hypertension     Difficult intravenous access    [facility] or reservoir infection     Frequent falls 08/07/2020    Closed avulsion fracture of lateral malleolus of left fibula 11/20/2018    Pain of right thumb 11/20/2018    Acute left ankle pain 11/20/2018    Gamekeeper's thumb of right hand 11/20/2018    Cervical pain 10/31/2017    DKA (diabetic ketoacidoses) 07/04/2017    THEODORE (acute kidney injury) ([address] 07/04/2017    Acute pain of right shoulder 12/13/2016    Pain in scapula 11/15/2016    Acute pain of left shoulder 10/18/2016    Right wrist pain 10/18/2016    Right wrist sprain 10/18/2016    Rotator cuff strain 10/18/2016    Contusion of left shoulder 10/18/2016    DDD (degenerative disc disease), lumbar 08/11/2015    Bilateral chronic knee pain 08/03/2015    Back pain 08/03/2015    Lumbar sprain 08/03/2015    Knee MCL sprain 08/03/2015    Heel pain, chronic 10/21/2014    Pain in joint, lower leg 10/21/2014    Knee joint replacement status 10/21/2014    Contusion of knee 10/21/2014    Plantar fasciitis, bilateral 10/21/2014    Degenerative arthritis of thumb 05/13/2014    Tendinitis of left wrist 05/13/2014    Hand pain 05/13/2014    Contusion of toe, right 11/14/2013    SBO (small bowel obstruction) ([address] 59/30/7047    Metabolic encephalopathy 96/95/4837    Chronic kidney disease, stage 3, mod decreased GFR ([address] 01/22/2013    HLD (hyperlipidemia) 01/22/2013    Chronic pain associated with significant psychosocial dysfunction 04/16/2011     An established patient of [facility] (Dr Aminata Waller) whom I was consulted on by the ER physician who called me on this case this evening  She has chronic abd pain and diarrhea that worsened over the past few months, w N/V. Labs are OK.  CT

## 2022-06-28 NOTE — PROGRESS NOTES
Hospitalist Progress Note    CC: Shock [facility])    Hospital course:  62yo WF with PMHX sig for peptic ulcer disease, hx of C diff colitis (currently NO diarrhea), DMII, hx of subtotal colectomy, morbid obesity with BMI of 45.8 presents with vomiting and pt found in pool of coffee-ground emesis and was minimally responsive. Pt was hypotensive and tachycardic on arrival to ED. She was tachy at 110 and initial SBP in the 70s and minimally responsive to pain. Pt was give 2u pRBC, 5L fluid bolus total and R IJ central line placed. Pt does have L chest port as well. CT abd shows likely blood in gastric antrum. Pt did have subtotal colectomy in 2011 and 2013 had abd surgery for lysis of adhesions. PT did become more alert after fluid resuscitation and was clear and alert and oriented x 3 at time of my conversation which was witnessed by her brother. Pt doing poorly at present time. Will cancel remaining consults and have GI to sign off. Admit date: 6/27/2022  Days in hospital:  1  Status:  Inpatient       24 Hour Events: pt thirsty    Subjective: pt refuses any addition life saving measures - see ACP note    ROS:   A comprehensive review of systems was negative except for: thirsty and abd pain . Objective:    BP (!) 66/39   Pulse 95   Temp (!) 100.8 °F (38.2 °C) (Bladder)   Resp 18   Ht 5' 4.02\" (1.626 m)   Wt 267 lb 6.7 oz (121.3 kg)   SpO2 98%   BMI 45.88 kg/m²     Gen: ill appearing, very pale  HEENT: NC/AT, moist mucous membranes, no oropharyngeal erythema or exudate  Neck: supple, trachea midline, no anterior cervical or SC LAD  Heart:  Normal s1/s2, RRR, no murmurs, gallops, or rubs.  3+ leg edema  Lungs:  diminished bilaterally, no wheeze, no rales, no rhonchi, no crackles, no use of accessory muscles  Abd: bowel sounds distant, soft, distended, tender diffusely,  no masses  Extrem:  No clubbing, cyanosis,  3+ edema  Skin: no rashes or lesions  Psych: A & O x3  Neuro: grossly intact, moves all four extremities. Assessment:    Principal Problem:    Shock ([facility]  Active Problems:    Diabetes mellitus ([facility]    Acidosis    Ileus ([facility]    Acute metabolic encephalopathy    HLD (hyperlipidemia)    THEODORE (acute kidney injury) ([facility]  Resolved Problems:    * No resolved hospital problems. *      Plan:  1, septic shock - POA based on lactic acidosis, acute renal failure, elevated procalcitonin, ileus and possible entercolitis - continue IV Abx for now  2. Acute renal failure - with poor UOP - will cancel nephrology consult in light of recent conversation  3. Suspected upper GI bleed - protonix drip and comfort measures - pt declines all other interventions  4.  Appropriate for hospice care with current condition    Prognosis:  Poor    Code status:  Limited Code:, No Intubation/re-intubation , No Defibrillation/cardioversion, No Chest Compressions and  Yes Resuscitative Medications  Levophed only - do not add any other pressors or increase dose    DVT prophylaxis: SCDs  GI prophylaxis: Proton Pump Inhibitor  Antibiotic prophylaxis indicated:   no  Diet:  Diet NPO Exceptions are: Ice Chips         Disposition:  Other anticipate expiration - have consulted hospice    Medications:  Scheduled Meds:   mupirocin   Nasal BID    insulin lispro  0-6 Units SubCUTAneous TID WC    insulin lispro  0-3 Units SubCUTAneous Nightly    sodium chloride flush  5-40 mL IntraVENous 2 times per day    piperacillin-tazobactam  3,375 mg IntraVENous Q8H       PRN Meds:  glucose, dextrose bolus **OR** dextrose bolus, glucagon (rDNA), dextrose, sodium chloride flush, sodium chloride, ondansetron **OR** ondansetron, acetaminophen **OR** acetaminophen, tiZANidine    IV:   norepinephrine 21 mcg/min (06/28/22 0633)    pantoprozole (PROTONIX) infusion 8 mg/hr (06/28/22 0615)    dextrose      sodium chloride      sodium chloride 100 mL/hr at 06/27/22 6561         Intake/Output Summary (Last 24 hours) at 6/28/2022 0833  Last data filed at 6/28/2022 5381  Gross per 24 hour   Intake 2529.2 ml   Output 200 ml   Net 2329.2 ml       Results:  CBC:   Recent Labs     06/27/22  1542 06/27/22  1752 06/28/22  0146   WBC 8.8  --   --    HGB 13.9 12.6 12.9   HCT 42.6 38.6 40.1   .4*  --   --      --   --      BMP:   Recent Labs     06/27/22  1542      K 5.2*      CO2 12*   BUN 55*   CREATININE 3.2*     Mag: No results for input(s): MAG in the last 72 hours. Phos:   Lab Results   Component Value Date    PHOS 3.2 06/26/2020     No results found for: GLU    LIVER PROFILE:   Recent Labs     06/27/22  1542   AST 55*   ALT 39   LIPASE 21.0   BILITOT 0.3   ALKPHOS 65     PT/INR:   Recent Labs     06/27/22  1542   PROTIME 14.4   INR 1.13     APTT: No results for input(s): APTT in the last 72 hours.   UA:  Recent Labs     06/27/22  1828   COLORU Yellow   PHUR 6.0   CLARITYU Clear   SPECGRAV 1.015   LEUKOCYTESUR Negative   UROBILINOGEN 0.2   BILIRUBINUR Negative   BLOODU Negative   GLUCOSEU Negative       Invalid input(s): ABG  Lab Results   Component Value Date    CALCIUM 9.8 06/27/2022    PHOS 3.2 06/26/2020               Electronically signed by Krystal Chinchilla MD on 6/28/2022 at 8:33 AM

## 2022-06-28 NOTE — PROGRESS NOTES
Patient BP remained low, educated about levophed gtt, patient said that she wants to stay on the levophed gtt for right now, and could change her mind later. Notified Dr Gin Del Toro. Will remain on levo gtt and titrate to keep BP stable.      Electronically signed by Pura Nieves RN on 6/28/2022 at 2:34 AM

## 2022-06-28 NOTE — CARE COORDINATION
CASE MANAGEMENT INITIAL ASSESSMENT      Reviewed chart and completed assessment with patient and brother     Health Care Decision Maker :   Primary Decision Maker: Mabel Rosales \"Moose\" - Brother/Sister - [phone]          Can this person be reached and be able to respond quickly, such as within a few minutes or hours? Yes      Admit date/status: 6/27/22 Inpatient   Diagnosis: shock    Is this a Readmission?:  No      Insurance: Gianna Handing required for SNF: Yes       3 night stay required: No    Living arrangements, Adls, care needs, prior to admission: long term at [address] [place] [address] at home: none    Services in the home and/or outpatient, prior to admission: none    Current PCP: Que Rowley MD                   Transportation needs:  TBD      Dialysis Facility (if applicable)   · Name:  · Address:  · Dialysis Schedule:  · Phone:  · Fax:    PT/OT recs: none    Hospital Exemption Notification (HEN): n/a     Barriers to discharge: none    Plan/comments: Spoke with patient and brother. Patient desires to pass peacefully and quickly. Patient and brother (POA) hesitant to bring hospice on board as they are afraid it will delay initiating comfort care. Patient's desire at this time is to be able to tell her mother goodbye. Her mother is also at [facility] long term. [facility] and she states they are not able to transport patient's mother to hospital as she is bed bound. She states that if family is able to transport, then patient's mother would be able to come to the hospital to say her goodbyes. Brother states he is going to reach out to other family members to see if that will be possible. RN made aware of the above. May need to utilize Facetime as a last resort.      ECOC on chart for MD signature

## 2022-06-28 NOTE — PROGRESS NOTES
12*   BUN 55*   CREATININE 3.2*     LIVR:   Recent Labs     06/27/22  1542   AST 55*   ALT 39     PT/INR:   Recent Labs     06/27/22  1542   PROT 6.6   INR 1.13     APTT: No results for input(s): APTT in the last 72 hours. ABG: No results for input(s): PHART, NHR5QFI, PO2ART in the last 72 hours.   Consults (if GI or Nephrology- which group?)- no

## 2022-06-28 NOTE — ACP (ADVANCE CARE PLANNING)
ADVANCED CARE PLANNING    Name:Daksha Mcdaniel       :  1960              MRN:  [id number]      Purpose of Encounter: Advanced care planning in light of declining condition. Parties in attendance: :Leona Bautista MD, Family members:brother. Decisional Capacity:Yes    Diagnosis: Principal Problem:    Shock ([address]  Active Problems:    Diabetes mellitus ([address]    Acidosis    Ileus ([address]    Acute metabolic encephalopathy    HLD (hyperlipidemia)    THEODORE (acute kidney injury) ([address]  Resolved Problems:    * No resolved hospital problems. *    Patients Medical Story:60yo WF with PMHX sig for peptic ulcer disease, hx of C diff colitis (currently NO diarrhea), DMII, hx of subtotal colectomy, morbid obesity with BMI of 45.8 presents with vomiting and pt found in pool of coffee-ground emesis and was minimally responsive. Pt was hypotensive and tachycardic on arrival to ED. She was tachy at 110 and initial SBP in the 70s and minimally responsive to pain. Pt was give 2u pRBC, 5L fluid bolus total and R IJ central line placed. Pt does have L chest port as well. CT abd shows likely blood in gastric antrum. Pt did have subtotal colectomy in  and  had abd surgery for lysis of adhesions. PT did become more alert after fluid resuscitation and was clear and alert and oriented x 3 at time of my conversation which was witnessed by her brother. Goals of Care Determinations: Patient wishes to focus on comfort - she stated it was OK to keep levophed, but not to do anything else to escalate care - no procedures, no additional life saving medications. Brother initially was very concerned that we were not doing anything, however she was able to clearly state that she did not want any additional procedures, surgeries, life saving medications beyond the levophed that she was already on.   I explained that this would likely only buy her hours since we are not correcting the underlying problem and she verbalized understanding. I did discuss hospice since pt will likely  from this disease process and she understands and would like to speak to hospice. Plan: Will notify Mili Argueta MD of change in care plan. Will look at further interventions as needed. Code Status: At this time patient wishes to be Limited with current levophed only. No additional pressors or life saving measures. Time Spent with Patient: 17 minutes      Electronically signed by Mary Anne Morocho MD on 2022 at 10:02 AM  Thank you Mili Argueta MD for the opportunity to be involved in this patient's care.

## 2022-06-28 NOTE — H&P
Hospital Medicine History & Physical      PCP: Cami Prasad MD    Date of Admission: 6/27/2022    Date of Service: Pt seen/examined on 6/27/2022 and Admitted to Inpatient with expected LOS greater than two midnights due to medical therapy. Chief Complaint: Coffee-ground emesis, altered mental status      History Of Present Illness:  57 y.o. female who presented to [facility] with above complaints  Patient with PMH of PUD, C. difficile colitis, DM2, history of subtotal colectomy, anxiety/depression/HLD, morbid obesity, asthma presented to the ED via EMS for coffee-ground emesis, altered mental status. Patient was apparently found unresponsive at her nursing home [facility] in a pool of coffee-ground emesis, and minimally responsive. Patient was hypotensive and tachycardic on arrival to the ED. she was tachycardic at 111 with initial SBP in the 70s and minimally responsive only to pain. She was given partially 2 units of PRBC transfusion, at least 5 L IV fluid bolus and a right IJ central line was placed for poor IV access. Of note patient does have a left chest port. CT abdomen was obtained which was concerning for blood in the gastric antrum, GI general surgery was consulted. Patient has had a subtotal colectomy at [facility]. in 2011. In 2013 she had SBO requiring surgery and lysis of adhesions. After patient was resuscitated in the ED, she became more alert and on my interview she reported that over the last couple of days she has had abdominal pain. Yesterday she started throwing up every time she ate or drank anything. She reports that abdominal pain is severe and almost diffuse in location. She reportedly had a normal bowel movement yesterday. Denies any other symptoms. Does not recall what happened earlier today.      Past Medical History:          Diagnosis Date    Acid reflux     Acute renal failure ([facility] [address] 1/2011    resolved    Anemia 2011    Arthritis causing severe lower back pains    Asthma     Bronchitis     Bursitis     all joints    C. difficile colitis     Diabetes mellitus ([address]     age 4   Ramirez Herpes     [facility]    History of blood transfusion     Hypercholesteremia     Hypertension     Migraine     Neuropathy     hips to legs    Obesity     Psychiatric problem     anxiety and depression    Recurrent sinus infections     Renal failure     in past with 8 dialysis treatments    Retinopathy of both eyes     Sleep apnea     can't use a cpap    Ulcer, stomach peptic     x 3    Unspecified cerebral artery occlusion with cerebral infarction     \"silent stroke\"    Unspecified diseases of blood and blood-forming organs     bled for 1 year straight at age 4       Past Surgical History:          Procedure Laterality Date    ABDOMINAL EXPLORATION SURGERY  13    EXPLORATORY LAPAROTOMY LYSIS OF ADHESIONS    ABDOMINAL SURGERY      APPENDECTOMY      CARPAL TUNNEL RELEASE      both hands    CATARACT EXTRACTION W/  INTRAOCULAR LENS IMPLANT  11    right    CATARACT EXTRACTION W/  INTRAOCULAR LENS IMPLANT  2011    left eye     SECTION      COLONOSCOPY      polyps    COLONOSCOPY  2021    Colon complete; made it to anastomotic site    COLONOSCOPY N/A 3/17/2021    COLON W/ANES. [facility], OWN POA. NEG.  COVID, RESULTS ON CHART performed by Carlos Alvarado MD at [place] [address], ESOPHAGUS      ENDOSCOPY, COLON, DIAGNOSTIC      EYE SURGERY      laser, vitrectomy ou, cataract ou    FRACTURE SURGERY      HYSTERECTOMY      partial    JOINT REPLACEMENT      both knees    OTHER SURGICAL HISTORY  8/15/11    Left Shoulder Decompression    OTHER SURGICAL HISTORY      PORT REMOVAL (N/A Chest    PORT SURGERY N/A 12/3/2020    PORT REMOVAL performed by Jacey Cedeno MD at [address] [place] [address] 2020    PORT INSERTION performed by Jacey Cedeno MD at [place] [address] SURGERY      right     SUBTOTAL COLECTOMY  6/14/2000    TONSILLECTOMY      TUNNELED CENTRAL VENOUS CATHETER W/ SUBCUTANEOUS PORT Left 12/31/2020    TUNNELED VENOUS PORT PLACEMENT  8/12/11    TUNNELED VENOUS PORT PLACEMENT  3/1/13    Devonte Bustillos       Medications Prior to Admission:      Prior to Admission medications    Medication Sig Start Date End Date Taking? Authorizing Provider   dicyclomine (BENTYL) 10 MG capsule Take 10 mg by mouth every 8 hours as needed    Historical Provider, MD   polycarbophil (FIBERCON) 625 MG tablet Take 625 mg by mouth daily    Historical Provider, MD   gabapentin (NEURONTIN) 100 MG capsule Take 200 mg by mouth 2 times daily. Historical Provider, MD   ketorolac (ACULAR) 0.5 % ophthalmic solution Place 1 drop into both eyes daily    Historical Provider, MD   ketotifen (ZADITOR) 0.025 % ophthalmic solution Place 1 drop into both eyes 2 times daily    Historical Provider, MD   insulin aspart (NOVOLOG PENFILL) 100 UNIT/ML injection cartridge Inject 1 Units into the skin as needed for High Blood Sugar Insulin Aspart Sliding Scale:  160-199 give 2 units  200-249 give 4 units  250-299 give 6 units  300-349 give 8 units  350-399 give 12 units  400+ call MD    Historical Provider, MD   miconazole (MICOTIN) 2 % powder Apply 1 Act topically 2 times daily Apply to under bilat breast every day and night shift for redness. Before applying, cleanse with NS and pat dry    Historical Provider, MD   tiZANidine (ZANAFLEX) 4 MG tablet Take 4 mg by mouth every 6 hours as needed    Historical Provider, MD   ondansetron (ZOFRAN ODT) 4 MG disintegrating tablet Take 1-2 tablets by mouth every 12 hours as needed for Nausea May Sub regular tablet (non-ODT) if insurance does not cover ODT.   Patient taking differently: Take 4 mg by mouth every 6 hours as needed for Nausea  8/27/20   Grace Lynn APRN - CNP   insulin glargine (BASAGLAR KWIKPEN) 100 UNIT/ML injection pen Inject 28 Units into the skin 2 times daily Before breakfast and dinner  Patient taking differently: Inject 35 Units into the skin every morning (before breakfast)  8/9/20   Nir Leiva MD   insulin glargine (BASAGLAR KWIKPEN) 100 UNIT/ML injection pen Inject into the skin nightly    Historical Provider, MD   diazepam (VALIUM) 10 MG tablet take 1 tablet by oral route  every bedtime    Historical Provider, MD   atorvastatin (LIPITOR) 40 MG tablet Take 40 mg by mouth    Historical Provider, MD   Misc. Devices [facility]) MISC 1 each by Does not apply route daily 10/9/17   JAKE Meredith   Cholecalciferol (VITAMIN D3) 2000 UNITS CAPS Take  by mouth daily. Historical Provider, MD   Cyanocobalamin (VITAMIN B 12 PO) Take 500 mcg by mouth daily     Historical Provider, MD   sertraline (ZOLOFT) 100 MG tablet Take 200 mg by mouth nightly. Historical Provider, MD   lactulose (CHRONULAC) 10 GM/15ML solution Take 20 g by mouth daily as needed     Historical Provider, MD   montelukast (SINGULAIR) 10 MG tablet Take 10 mg by mouth nightly. Historical Provider, MD       Allergies:  Adhesive tape, Ambien [zolpidem], Codeine, Flurosyn [fluocinolone acetonide], Morphine, and Tegaderm ag mesh [silver]    Social History:      The patient currently lives at nursing home    TOBACCO:   reports that she has never smoked. She has never used smokeless tobacco.  ETOH:   reports no history of alcohol use.   E-cigarette/Vaping     Questions Responses    E-cigarette/Vaping Use Never User    Start Date     Passive Exposure     Quit Date     Counseling Given     Comments             Family History:   Positive as follows:        Problem Relation Age of Onset    Diabetes Father     Heart Disease Father         mi    High Blood Pressure Father     High Cholesterol Father     Diabetes Paternal Aunt     Heart Disease Paternal Aunt     High Blood Pressure Paternal Aunt     High Cholesterol Paternal Aunt     Cancer Paternal Aunt         ovarian    Mult Sclerosis Paternal Aunt     Dementia Mother     High Blood Pressure Mother     Heart Disease Maternal Grandmother     Heart Disease Paternal Grandmother     Diabetes Paternal Uncle     Heart Disease Paternal Uncle     High Blood Pressure Paternal Uncle     High Cholesterol Paternal Uncle     Cancer Paternal Uncle     Cancer Maternal Grandfather         lung       REVIEW OF SYSTEMS COMPLETED:   Pertinent positives as noted in the HPI. All other systems reviewed and negative. PHYSICAL EXAM PERFORMED:    BP 85/63   Pulse (!) 108   Temp (!) 100.5 °F (38.1 °C) (Bladder)   Resp 19   Ht 5' 4.02\" (1.626 m)   Wt 267 lb 6.7 oz (121.3 kg)   SpO2 93%   BMI 45.88 kg/m²     General appearance:  No apparent distress, appears stated age and cooperative. HEENT:  Normal cephalic, atraumatic without obvious deformity. Pupils equal, round, and reactive to light. Extra ocular muscles intact. Conjunctivae/corneas clear. Neck: Supple, with full range of motion. No jugular venous distention. Trachea midline. Respiratory:  Normal respiratory effort. Clear to auscultation, bilaterally without Rales/Wheezes/Rhonchi. Cardiovascular:  Regular rate and rhythm with normal S1/S2 without murmurs, rubs or gallops. Abdomen:   Soft, tender diffusely, no rebound tenderness, no guarding or rigidity , mildly distended with hypoactive bowel sounds. Musculoskeletal:  No clubbing, cyanosis or edema bilaterally. Full range of motion without deformity. Skin: Skin color, texture, turgor normal.  No rashes or lesions. Neurologic:  Neurovascularly intact without any focal sensory/motor deficits.  Cranial nerves: II-XII intact, grossly non-focal.  Psychiatric:  Alert and oriented, thought content appropriate, normal insight  Capillary Refill: Brisk,3 seconds, normal  Peripheral Pulses: +2 palpable, equal bilaterally       Labs:     Recent Labs     06/27/22  1542 06/27/22  1752   WBC 8.8  --    HGB 13.9 12.6   HCT 42.6 38.6    --      Recent Labs     06/27/22  1542      K 5.2*      CO2 12*   BUN 55*   CREATININE 3.2*   CALCIUM 9.8     Recent Labs     06/27/22  1542   AST 55*   ALT 39   BILITOT 0.3   ALKPHOS 65     Recent Labs     06/27/22  1542   INR 1.13     Recent Labs     06/27/22  1542   TROPONINI 0.05*       Urinalysis:      Lab Results   Component Value Date    NITRU Negative 06/27/2022    WBCUA 10-20 06/26/2020    BACTERIA 3+ 06/26/2020    RBCUA None seen 06/26/2020    BLOODU Negative 06/27/2022    SPECGRAV 1.015 06/27/2022    GLUCOSEU Negative 06/27/2022    GLUCOSEU NEGATIVE 05/03/2012       Radiology:     I personally reviewed the CT abdomen pelvis, CXR and also reviewed radiologist reports      CT ABDOMEN PELVIS WO CONTRAST Additional Contrast? None   Final Result   Layering hyperdense material is noted within the gastric fundus. This   material could represent in part layering blood products, but the attenuation   is greater than would be expected. Other considerations would include   hyperdense ingested material or retained oral contrast if the patient has   received any. Diffuse gaseous and fluid-filled dilation of the small bowel and colon   without transition point, consistent with ileus. Fat stranding is noted   along multiple small bowel and colonic loops, in this may represent reactive   ileus related to enterocolitis. Liquid stool throughout the colon is suggestive of a diarrheal illness. XR CHEST PORTABLE   Final Result   No acute process. Right jugular central venous catheter terminates in the SVC      Left central venous catheter terminates in the superior right atrium      No acute cardiopulmonary process             Consults:    IP CONSULT TO GI  IP CONSULT TO GENERAL SURGERY  IP CONSULT TO HOSPITALIST  IP CONSULT TO NEPHROLOGY    ASSESSMENT:PLAN:    Shock   Initial presentation was concerning for hemorrhagic shock from GI bleed.   However he was not actively bleeding in the ED and Hb 12-13 makes it less likely. Could be hypovolemic and/are septic shock given severe THEODORE. Also procalcitonin highly elevated at 92.24, could be abdominal source of infection.  -Initial fluid resuscitation given 4 to 5 L in the ED  -Right IJ TLC placed in the ER  -IV Levophed gtt. ordered for vasopressor support  -Continue to check serial lactic acid levels  -We will continue IV Zosyn for empiric coverage of intra-abdominal source of infection  -Continue to trend H&H and transfuse if Hb <7  -Follow-up blood cultures x2 sets    Ileus/enterocolitis  -IV Zosyn. Bowel rest, IV fluids, serial exams and radiographic exams, general surgery on consult. Check stool for C. difficile, GI bacterial pathogen PCR. No evidence of bowel ischemia on CT. Acute GI hemorrhage -coffee-ground emesis at the scene with blood in stools. GI was consulted, no plan for urgent endoscopy at this time. Continue PPI IV GGT, continue to trend H&H and transfuse as needed. She received 1 unit PRBC in the ED. right IJ TLC for central IV access obtained. Continue n.p.o. status    Hx of PUD -on PPI    Acute kidney injury -could be ATN from sepsis/shock. And/or prerenal THEODORE. Aggressive IV fluid hydration in the ED. hold nephrotoxic medications. Vasopressor support to keep MAP >65. Check serial renal panel. Nephrology consulted    Metabolic acidosis -due to THEODORE. Continue intravascular volume repletion, consider supplemental bicarb IV. Nephrology has been consulted    Acute metabolic encephalopathy-improving.      DM type II-no evidence of DKA, continue sliding scale coverage with hypoglycemia protocol    DVT Prophylaxis: SCD  Diet: Diet NPO Exceptions are: Ice Chips  Code Status: Limited    PT/OT Eval Status: Not consulted    Dispo -IP stay    Total critical care time caring for this patient with life threatening, unstable organ failure, including direct patient contact, management of life support systems, review of data including imaging and labs, discussions with other team members and physicians at least 40 min so far today, excluding procedures. Shane Dodge MD    Thank you Felicia Carrera MD for the opportunity to be involved in this patient's care. If you have any questions or concerns please feel free to contact me at [phone].

## 2022-06-28 NOTE — PROGRESS NOTES
Patient seen and examined earlier this am during ICU rounds  Drowsy and hypotensive requiring levophed. Multiple discussions regarding goals of care and patient clearly stated she was ready to focus on comfort measures and hospice was consulted. Family requested [facility] as other family members have received care from them. Approached by RN and case management as patient continued to have some discomfort and hospice not yet on site to see the patient. Patient's brothers at the bedside and expressed concern and wished for comfort measures. Patient is awaiting her daughter to arrive and they plan to video conference the patient's mother (at [facility]). Plan at this time is to turn off vasopressors once patient has had the opportunity to be with family. Patient understands once the levophed is turned off her BP will likely drop and the goal is for comfort. Pain/anxiety medication ordered and code status changed to [facility]. Updated primary RN, case management. [facility] services present for family support. Meseret Gee, APRN-CNP

## 2022-06-28 NOTE — CONSULTS
Consult received and discussed with other providers and patient. Patient states she does not want an operation if needed and does not want any invasive procedures. Hospice has been consulted. Will sign off.   Chaog Meredith MD

## 2022-06-28 NOTE — FLOWSHEET NOTE
06/28/22 1010   Encounter Summary   Encounter Overview/Reason  Initial Encounter   Service Provided For: Patient   Referral/Consult From: Physician   Last Encounter  06/28/22  (6-28, daughter with limited support. prayer)   Complexity of Encounter Low   Begin Time 1000   End Time  1011   Total Time Calculated 11 min   Encounter    Type Initial Screen/Assessment   Assessment/Intervention/Outcome   Assessment Unable to assess   Intervention Other (Comment)   Outcome   (Patient smilled and nodded \"no\" to offer of prayer.)   Patient currently minimally responsive. She did smile. Nodded \"no\" to offer of prayer. No family present. Goals of care reviewed. Spiritual care will continue to be available for support as end-of-life journey unfolds.

## 2022-06-28 NOTE — PROGRESS NOTES
Gastroenterology Progress Note    Patient:   Aleida Alvarado   YOB: 1960   Facility:   [facility]   Referring/PCP: Fran Polo MD  Date:     6/28/2022  Consultant:   Celio Maier MD, MD    Subjective: This 57 y.o. female was admitted 6/27/2022 with a diagnosis of \"Shock [facility]) [R57.9]\" and is seen in consultation regarding \"abd pain\". Information was obtained from interview of  the patient, examination of the patient, and review of records. I did  update the past medical, surgical, social and / or family history. abd pain for yrs worsening mod assoc w diarrhea    Current status  Present  Diet Order: Diet NPO Exceptions are: Ice Chips and she is tolerating diet. Recently, she has experienced moderate, maximum 6/10 generalized abdominal  Pain and she has required Intravenous narcotic analgesics. The patient has also experienced no constipation, fever, hematochezia and melena      Prior to Admission medications    Medication Sig Start Date End Date Taking? Authorizing Provider   dicyclomine (BENTYL) 10 MG capsule Take 10 mg by mouth every 8 hours as needed    Historical Provider, MD   polycarbophil (FIBERCON) 625 MG tablet Take 625 mg by mouth daily    Historical Provider, MD   gabapentin (NEURONTIN) 100 MG capsule Take 200 mg by mouth 2 times daily.     Historical Provider, MD   ketorolac (ACULAR) 0.5 % ophthalmic solution Place 1 drop into both eyes daily    Historical Provider, MD   ketotifen (ZADITOR) 0.025 % ophthalmic solution Place 1 drop into both eyes 2 times daily    Historical Provider, MD   insulin aspart (NOVOLOG PENFILL) 100 UNIT/ML injection cartridge Inject 1 Units into the skin as needed for High Blood Sugar Insulin Aspart Sliding Scale:  160-199 give 2 units  200-249 give 4 units  250-299 give 6 units  300-349 give 8 units  350-399 give 12 units  400+ call MD    Historical Provider, MD   miconazole (MICOTIN) 2 % powder Apply 1 Act topically 2 times daily Apply to under bilat breast every day and night shift for redness. Before applying, cleanse with NS and pat dry    Historical Provider, MD   tiZANidine (ZANAFLEX) 4 MG tablet Take 4 mg by mouth every 6 hours as needed    Historical Provider, MD   ondansetron (ZOFRAN ODT) 4 MG disintegrating tablet Take 1-2 tablets by mouth every 12 hours as needed for Nausea May Sub regular tablet (non-ODT) if insurance does not cover ODT. Patient taking differently: Take 4 mg by mouth every 6 hours as needed for Nausea  8/27/20   CHIQUITA Bruce - CNP   insulin glargine [facility]) 100 UNIT/ML injection pen Inject 28 Units into the skin 2 times daily Before breakfast and dinner  Patient taking differently: Inject 35 Units into the skin every morning (before breakfast)  8/9/20   Tricia Phillips MD   insulin glargine (BASAGLAR KWIKPEN) 100 UNIT/ML injection pen Inject into the skin nightly    Historical Provider, MD   diazepam (VALIUM) 10 MG tablet take 1 tablet by oral route  every bedtime    Historical Provider, MD   atorvastatin (LIPITOR) 40 MG tablet Take 40 mg by mouth    Historical Provider, MD   Misc. Devices Lenoria Ozark) MISC 1 each by Does not apply route daily 10/9/17   JAKE Vasquez Asa   Cholecalciferol (VITAMIN D3) 2000 UNITS CAPS Take  by mouth daily. Historical Provider, MD   Cyanocobalamin (VITAMIN B 12 PO) Take 500 mcg by mouth daily     Historical Provider, MD   sertraline (ZOLOFT) 100 MG tablet Take 200 mg by mouth nightly. Historical Provider, MD   lactulose (CHRONULAC) 10 GM/15ML solution Take 20 g by mouth daily as needed     Historical Provider, MD   montelukast (SINGULAIR) 10 MG tablet Take 10 mg by mouth nightly.     Historical Provider, MD      Scheduled Medications:    insulin lispro  0-6 Units SubCUTAneous TID WC    insulin lispro  0-3 Units SubCUTAneous Nightly    sodium chloride flush  5-40 mL IntraVENous 2 times per day    piperacillin-tazobactam  3,375 mg IntraVENous Q8H     Infusions:    norepinephrine 21 mcg/min (06/28/22 2238)    pantoprozole (PROTONIX) infusion 8 mg/hr (06/28/22 0615)    dextrose      sodium chloride      sodium chloride 100 mL/hr at 06/27/22 2215     PRN Medications: glucose, dextrose bolus **OR** dextrose bolus, glucagon (rDNA), dextrose, sodium chloride flush, sodium chloride, ondansetron **OR** ondansetron, acetaminophen **OR** acetaminophen, tiZANidine  Allergies:    Allergies   Allergen Reactions    Adhesive Tape Dermatitis    Ambien [Zolpidem]      Made me wild    Codeine      Can take a little just not a lot at a time    Flurosyn [Fluocinolone Acetonide]      \"flurosceen eye dye\" severe HA, fainting    Morphine      Went bezerk    Tegaderm Ag Mesh [Silver] Dermatitis     All types of tegaderm and adhesives       Past Medical History:   Diagnosis Date    Acid reflux     Acute renal failure ([facility] 1/2011    resolved    Anemia 2011    Arthritis     causing severe lower back pains    Asthma     Bronchitis     Bursitis     all joints    C. difficile colitis     Diabetes mellitus ([facility]     age 4   Ramirez Herpes     5    History of blood transfusion     Hypercholesteremia     Hypertension     Migraine     Neuropathy     hips to legs    Obesity     Psychiatric problem     anxiety and depression    Recurrent sinus infections     Renal failure     in past with 8 dialysis treatments    Retinopathy of both eyes     Sleep apnea     can't use a cpap    Ulcer, stomach peptic     x 3    Unspecified cerebral artery occlusion with cerebral infarction     \"silent stroke\"    Unspecified diseases of blood and blood-forming organs     bled for 1 year straight at age 4     Past Surgical History:   Procedure Laterality Date    ABDOMINAL EXPLORATION SURGERY  1/25/13    EXPLORATORY LAPAROTOMY LYSIS OF ADHESIONS    ABDOMINAL SURGERY      APPENDECTOMY      CARPAL TUNNEL RELEASE      both hands    CATARACT EXTRACTION W/  INTRAOCULAR LENS IMPLANT  11    right    CATARACT EXTRACTION W/  INTRAOCULAR LENS IMPLANT  2011    left eye     SECTION      COLONOSCOPY      polyps    COLONOSCOPY  2021    Colon complete; made it to anastomotic site    COLONOSCOPY N/A 3/17/2021    COLON W/ANES. [facility] PT, OWN POA. NEG.  COVID, RESULTS ON CHART performed by Jacques Doss MD at [place] [address], ESOPHAGUS      ENDOSCOPY, COLON, DIAGNOSTIC      EYE SURGERY      laser, vitrectomy ou, cataract ou    FRACTURE SURGERY      HYSTERECTOMY      partial    JOINT REPLACEMENT      both knees    OTHER SURGICAL HISTORY  8/15/11    Left Shoulder Decompression    OTHER SURGICAL HISTORY      PORT REMOVAL (N/A Chest    PORT SURGERY N/A 12/3/2020    PORT REMOVAL performed by Adam Quiroga MD at [address] [place] [address] Left 2020    PORT INSERTION performed by Adam [facility]iroga MD at [facility]      right     SUBTOTAL COLECTOMY  2000    TONSILLECTOMY      TUNNELED CENTRAL VENOUS CATHETER W/ SUBCUTANEOUS PORT Left 2020    TUNNELED VENOUS PORT PLACEMENT  11    TUNNELED VENOUS PORT PLACEMENT  3/1/13    [facility]       Social:   Social History     Tobacco Use    Smoking status: Never Smoker    Smokeless tobacco: Never Used   Substance Use Topics    Alcohol use: No     Family:   Family History   Problem Relation Age of Onset    Diabetes Father     Heart Disease Father         mi    High Blood Pressure Father     High Cholesterol Father     Diabetes Paternal Aunt     Heart Disease Paternal Aunt     High Blood Pressure Paternal Aunt     High Cholesterol Paternal Aunt     Cancer Paternal Aunt         ovarian    Mult Sclerosis Paternal Aunt     Dementia Mother     High Blood Pressure Mother     Heart Disease Maternal Grandmother     Heart Disease Paternal Grandmother     Diabetes Paternal Uncle     Heart Disease Paternal Uncle     High Blood Pressure Paternal Uncle     High Cholesterol Paternal Uncle     Cancer Paternal Uncle     Cancer Maternal Grandfather         lung       ROS: Pertinent items are noted in HPI.     Objective:   Vital Signs:  Temp (24hrs), Av.2 °F (37.9 °C), Min:99.2 °F (37.3 °C), Max:100.8 °F (54.6 °C)     Systolic (28VZL), RJF:20 , Min:48 , YSQ:582      Diastolic (85MHL), VLB:54, Min:20, Max:100     Pulse  Av.5  Min: 86  Max: 113  BP (!) 79/45   Pulse 99   Temp (!) 100.8 °F (38.2 °C) (Bladder)   Resp 21   Ht 5' 4.02\" (1.626 m)   Wt 267 lb 6.7 oz (121.3 kg)   SpO2 98%   BMI 45.88 kg/m²      Physical Exam:   BP (!) 79/45   Pulse 99   Temp (!) 100.8 °F (38.2 °C) (Bladder)   Resp 21   Ht 5' 4.02\" (1.626 m)   Wt 267 lb 6.7 oz (121.3 kg)   SpO2 98%   BMI 45.88 kg/m²   General appearance: alert, appears stated age and cooperative  Lungs: clear to auscultation bilaterally  Chest wall: no tenderness  Heart: regular rate and rhythm, S1, S2 normal, no murmur, click, rub or gallop  Abdomen: abnormal findings:  tenderness marked in the entire abdomen  Extremities: extremities normal, atraumatic, no cyanosis or edema  Skin: Skin color, texture, turgor normal. No rashes or lesions  Neurologic: Grossly normal    Lab and Imaging Review   Recent Labs     22  1542 22  1752 22  0146   WBC 8.8  --   --    HGB 13.9 12.6 12.9   .4*  --   --      --   --    INR 1.13  --   --      --   --    K 5.2*  --   --      --   --    CO2 12*  --   --    BUN 55*  --   --    CREATININE 3.2*  --   --    GLUCOSE 264*  --   --    CALCIUM 9.8  --   --    PROT 6.6  --   --    LABALBU 3.0*  --   --    AST 55*  --   --    ALT 39  --   --    ALKPHOS 65  --   --    BILITOT 0.3  --   --    LIPASE 21.0  --   --        Assessment:     Patient Active Problem List    Diagnosis Date Noted    Diabetic ketoacidosis with coma ([facility]     Non-traumatic rhabdomyolysis     NSTEMI (non-ST elevated myocardial infarction) ([address] 07/05/2017    Shock ([address] 06/27/2022    Acidosis 06/27/2022    Ileus ([address] 06/27/2022    Acute metabolic encephalopathy 82/77/0031    Morbid obesity due to excess calories ([address]     Vomiting 02/01/2011    Diabetes mellitus ([address]     Essential hypertension     Difficult intravenous access    [facility] or reservoir infection     Frequent falls 08/07/2020    Closed avulsion fracture of lateral malleolus of left fibula 11/20/2018    Pain of right thumb 11/20/2018    Acute left ankle pain 11/20/2018    Gamekeeper's thumb of right hand 11/20/2018    Cervical pain 10/31/2017    DKA (diabetic ketoacidoses) 07/04/2017    THEODORE (acute kidney injury) ([address] 07/04/2017    Acute pain of right shoulder 12/13/2016    Pain in scapula 11/15/2016    Acute pain of left shoulder 10/18/2016    Right wrist pain 10/18/2016    Right wrist sprain 10/18/2016    Rotator cuff strain 10/18/2016    Contusion of left shoulder 10/18/2016    DDD (degenerative disc disease), lumbar 08/11/2015    Bilateral chronic knee pain 08/03/2015    Back pain 08/03/2015    Lumbar sprain 08/03/2015    Knee MCL sprain 08/03/2015    Heel pain, chronic 10/21/2014    Pain in joint, lower leg 10/21/2014    Knee joint replacement status 10/21/2014    Contusion of knee 10/21/2014    Plantar fasciitis, bilateral 10/21/2014    Degenerative arthritis of thumb 05/13/2014    Tendinitis of left wrist 05/13/2014    Hand pain 05/13/2014    Contusion of toe, right 11/14/2013    SBO (small bowel obstruction) ([address] 06/22/0623    Metabolic encephalopathy 19/82/1305    Chronic kidney disease, stage 3, mod decreased GFR ([address] 01/22/2013    HLD (hyperlipidemia) 01/22/2013    Chronic pain associated with significant psychosocial dysfunction 04/16/2011     An established patient of [facility] (Dr Tim Wang) whom I was consulted on by the ER physician who called me on this case this evening  She has chronic abd pain and diarrhea that worsened over the past few months, w N/V. Labs are OK. CT revealed ?enterocolitis vs ileus. She has mildly less abd pain nad tenderness, and abd is soft. Had fever. Patient only wants comfort measures    Plan:   1. Supportive care  2. Management per primary team (IV PPI and Abx's)  3. Consider repeating CT  4.  Will follow    Chuy Brown MD       (O) [phone]

## 2022-06-28 NOTE — PROGRESS NOTES
Spoke with patient at the bedside with Charge RN. Patient has DNR-CC paperwork at the bedside, and does not wish to have any lifesaving measures done for her and does not wish to be on Levophed gtt either. Perfect served Dr Lesley Shepherd.     Electronically signed by Misa Manning RN on 6/27/2022 at 9:47 PM

## 2022-06-29 NOTE — FLOWSHEET NOTE
Patient with absence of vital signs        06/29/22 1913   Vitals   Heart Rate (!) 0   Resp (!) 0       No heart tones or breath sounds on assessment per 2 RN     Time of death: 0    Notified night shift hospitalist Dr Helen Abdalla, RN    Electronically signed by Shelley Dee, RN on 6/29/2022 at 7:21 PM

## 2022-06-29 NOTE — DISCHARGE SUMMARY
Hospitalist Discharge Summary/Hospice Admit H&P    Patient ID:  Adrianna Schaefer  [id number]  57 y.o.  1960    Admit date: 6/27/2022    Discharge date: 6/29/2022 from inpatient service    Admit date:  6/29/2022 for inpatient hospice    Disposition: inpatient hospice    Admission Diagnoses:   Patient Active Problem List   Diagnosis    Diabetes mellitus ([address]    Essential hypertension    Vomiting    Morbid obesity due to excess calories ([address]    Chronic pain associated with significant psychosocial dysfunction    [facility]    Metabolic encephalopathy    Chronic kidney disease, stage 3, mod decreased GFR ([facility])    HLD (hyperlipidemia)    Contusion of toe, right    Degenerative arthritis of thumb    Tendinitis of left wrist    Hand pain    Heel pain, chronic    Pain in joint, lower leg    Knee joint replacement status    Contusion of knee    Plantar fasciitis, bilateral    Bilateral chronic knee pain    Back pain    Lumbar sprain    Knee MCL sprain    DDD (degenerative disc disease), lumbar    Acute pain of left shoulder    Right wrist pain    Right wrist sprain    Rotator cuff strain    Contusion of left shoulder    Pain in scapula    Acute pain of right shoulder    DKA (diabetic ketoacidoses)    THEODORE (acute kidney injury) ([address]    NSTEMI (non-ST elevated myocardial infarction) ([address]    Diabetic ketoacidosis with coma ([address]    Non-traumatic rhabdomyolysis    Cervical pain    Closed avulsion fracture of lateral malleolus of left fibula    Pain of right thumb    Acute left ankle pain    Gamekeeper's thumb of right hand    Frequent falls   [employer] or reservoir infection    Difficult intravenous access    Shock ([address]    Acidosis    Ileus ([address]    Acute metabolic encephalopathy       Discharge Diagnoses: Principal Problem:    Shock ([address]  Active Problems:    Diabetes mellitus ([address]    Acidosis    Ileus ([address]    Acute metabolic encephalopathy    HLD (hyperlipidemia)    THEODORE (acute kidney injury) ([facility]  Resolved Problems:    * No resolved hospital problems. *      Code Status:  DNR-CC    Condition:  Terminal    Discharge/Readmit Diet:  Diet NPO Exceptions are: Ice Chips    PCP to do list:  Admitting to inpatient hospice with [facility] Course: 64yo WF with PMHX sig for peptic ulcer disease, hx of C diff colitis (currently NO diarrhea), DMII, hx of subtotal colectomy, morbid obesity with BMI of 45.8 presents with vomiting and pt found in pool of coffee-ground emesis and was minimally responsive.  Pt was hypotensive and tachycardic on arrival to ED. Roxane Common was tachy at 110 and initial SBP in the 70s and minimally responsive to pain.  Pt was give 2u pRBC, 5L fluid bolus total and R IJ central line placed.  Pt does have L chest port as well. CT abd shows likely blood in gastric antrum.  Pt did have subtotal colectomy in 2011 and 2013 had abd surgery for lysis of adhesions.  PT did become more alert after fluid resuscitation and was clear and alert and oriented x 3 at time of my conversation which was witnessed by her brother.  She desires no further interventions. Daughter states she is ready to die and has suffered most of her life. Pt doing poorly at present time.  Will cancel remaining consults and have GI to sign off. Pt did meet criteria for sepsis POA based on lactic acidosis, acute renal failure, elevated procalcitonin, hypotension, tachycardia, fever, tachypnea. Source:  [facility].     Discharge Medications:   Current Discharge Medication List        Current Discharge Medication List        Current Discharge Medication List      CONTINUE these medications which have NOT CHANGED    Details   dicyclomine (BENTYL) 10 MG capsule Take 10 mg by mouth every 8 hours as needed      polycarbophil (FIBERCON) 625 MG tablet Take 625 mg by mouth daily      gabapentin (NEURONTIN) 100 MG capsule Take 200 mg by mouth 2 times daily.      ketorolac (ACULAR) 0.5 % ophthalmic solution Place 1 drop into both eyes daily      ketotifen (ZADITOR) 0.025 % ophthalmic solution Place 1 drop into both eyes 2 times daily      insulin aspart (NOVOLOG PENFILL) 100 UNIT/ML injection cartridge Inject 1 Units into the skin as needed for High Blood Sugar Insulin Aspart Sliding Scale:  160-199 give 2 units  200-249 give 4 units  250-299 give 6 units  300-349 give 8 units  350-399 give 12 units  400+ call MD      miconazole (MICOTIN) 2 % powder Apply 1 Act topically 2 times daily Apply to under bilat breast every day and night shift for redness. Before applying, cleanse with NS and pat dry      tiZANidine (ZANAFLEX) 4 MG tablet Take 4 mg by mouth every 6 hours as needed      ondansetron (ZOFRAN ODT) 4 MG disintegrating tablet Take 1-2 tablets by mouth every 12 hours as needed for Nausea May Sub regular tablet (non-ODT) if insurance does not cover ODT. Qty: 12 tablet, Refills: 0      !! insulin glargine (BASAGLAR KWIKPEN) 100 UNIT/ML injection pen Inject 28 Units into the skin 2 times daily Before breakfast and dinner  Qty: 5 pen, Refills: 3      !! insulin glargine (BASAGLAR KWIKPEN) 100 UNIT/ML injection pen Inject into the skin nightly      diazepam (VALIUM) 10 MG tablet take 1 tablet by oral route  every bedtime      atorvastatin (LIPITOR) 40 MG tablet Take 40 mg by mouth      Misc. Devices (WALKER) MISC 1 each by Does not apply route daily  Qty: 1 each, Refills: 0      Cholecalciferol (VITAMIN D3) 2000 UNITS CAPS Take  by mouth daily. Cyanocobalamin (VITAMIN B 12 PO) Take 500 mcg by mouth daily       sertraline (ZOLOFT) 100 MG tablet Take 200 mg by mouth nightly. lactulose (CHRONULAC) 10 GM/15ML solution Take 20 g by mouth daily as needed       montelukast (SINGULAIR) 10 MG tablet Take 10 mg by mouth nightly. !! - Potential duplicate medications found. Please discuss with provider.         Current Discharge Medication List Procedures: central line placement    Assessment on Discharge: Poor, terminal    ROS:  Unable to fully assess due to lethargy - says she has some pain    Physicial Exam:  BP (!) 82/46   Pulse (!) 103   Temp 99.8 °F (37.7 °C) (Bladder)   Resp 13   Ht 5' 4.02\" (1.626 m)   Wt 267 lb 6.7 oz (121.3 kg)   SpO2 100%   BMI 45.88 kg/m²     Gen: pale, obese  HEENT: NC/AT, moist mucous membranes, no oropharyngeal erythema or exudate  Neck: supple, trachea midline, no anterior cervical or SC LAD  Heart:  Normal s1/s2, RRR, no murmurs, gallops, or rubs. 3+ leg edema  Lungs:  diminished bilaterally, no wheeze,no rales or rhonchi, no use of accessory muscles  Abd: bowel sounds present, soft, nontender, nondistended, no masses  Extrem:  No clubbing, cyanosis,  3+ edema  Skin: no lesion or masses  Psych:  Awakens, but encephalopathic  Neuro: moves extremities, but weak    Pertinent Studies During Hospital Stay:  Radiology:  CT ABDOMEN PELVIS WO CONTRAST Additional Contrast? None    Result Date: 6/27/2022  EXAMINATION: CT OF THE ABDOMEN AND PELVIS WITHOUT CONTRAST 6/27/2022 5:17 pm TECHNIQUE: CT of the abdomen and pelvis was performed without the administration of intravenous contrast. Multiplanar reformatted images are provided for review. Automated exposure control, iterative reconstruction, and/or weight based adjustment of the mA/kV was utilized to reduce the radiation dose to as low as reasonably achievable. COMPARISON: None. HISTORY: ORDERING SYSTEM PROVIDED HISTORY: GI bleed TECHNOLOGIST PROVIDED HISTORY: Reason for exam:->GI bleed Additional Contrast?->None Reason for Exam: Hematemesis  poss gi bleed    . sudden onset abdominal pain and bloation. nausea Relevant Medical/Surgical History: appe  hysterectomy FINDINGS: Lower Chest:  Visualized portion of the lower chest demonstrates no acute abnormality. Organs:  Liver is normal in size and CT attenuation.  Layering biliary sludge within the gallbladder, exam:->Central line placement confirmation Reason for Exam: Central line placement confirmation FINDINGS: Left jugular central venous catheter terminates in the superior right atrium. Right jugular central venous catheter terminates in the SVC. The lungs are without acute focal process. There is no effusion or pneumothorax. The cardiomediastinal silhouette is stable. The osseous structures are stable. No acute process. Right jugular central venous catheter terminates in the SVC Left central venous catheter terminates in the superior right atrium No acute cardiopulmonary process       Blood cx - one cx pos for coag neg staph - likely contaminant    Last Labs on Discharge:     No results found for this or any previous visit (from the past 24 hour(s)). Follow up: with Cami Prasad MD    Note that 35 minutes was spent in preparing discharge papers, discussing discharge with patient, medication review, etc.    Thank you Cami Prasad MD for the opportunity to be involved in this patient's care. If you have any questions or concerns please feel free to contact me at [phone].       Electronically signed by Leona Morales MD on 6/29/2022 at 9:14 AM

## 2022-06-29 NOTE — PROGRESS NOTES
Patient's family members at the bedside, family requested not to turn patient and just to do comfort care. Will continue to monitor and follow family's requests.      Electronically signed by Pura Nieves RN on 6/28/2022 at 9:07 PM

## 2022-06-29 NOTE — PROGRESS NOTES
Shift: 7p-7a    Admitting diagnosis: shock    Presentation to hospital: AMS, shock, coffee ground emesis, from [facility]     Surgery: no     Nursing assessment at handoff  SPECIALISTS [facility], [facility]    Emergency Contact/POA: Jaspal Maradiagamoi) [place]  Family updated: yes     Most recent vitals: BP (!) 93/43   Pulse (!) 107   Temp 99.8 °F (37.7 °C) (Bladder)   Resp 12   Ht 5' 4.02\" (1.626 m)   Wt 267 lb 6.7 oz (121.3 kg)   SpO2 96%   BMI 45.88 kg/m²      Rhythm: Normal Sinus Rhythm     NC/HFNC- room air  Respiratory support: - No ventilator support    Vent days: Day N/A    Increased O2 requirements: no    Admission weight Weight: 300 lb (136.1 kg)  Today's weight   Wt Readings from Last 1 Encounters:   06/27/22 267 lb 6.7 oz (121.3 kg)         UOP >30ml/hr: yes - dr. Dhruv Estrada need assessed each shift: yes    Restraints: no  Order current and documentation up to date? Lines/Drains  LDA Insertion Date Discontinued Date Dressing Changes   PIV       TLC       Arterial       Estrada       Vas Cath      ETT       Surgical drains        Night Shift Hospitalist Interventions    Problem(Brief) Date Time Intervention Physician contacted                                               Drip rates at handoff:    norepinephrine Stopped (06/28/22 1838)    pantoprozole (PROTONIX) infusion Stopped (06/28/22 1838)    dextrose      sodium chloride      sodium chloride 100 mL/hr at 06/27/22 2123       Hospital Course Daily Updates:  Admit Day# 1- Pt now a DNR-CC. Awaiting family to visit and then pt will let us know when she is ready to have levo gtt turned off and start PRN comfort measures. Admit Night #2 - patient remained in the ICU with comfort care, family at the bedside, ST, SBP in 90s, PRN pain meds for comfort.      Lab Data:   CBC:   Recent Labs     06/27/22  1542 06/27/22  1542 06/27/22  1752 06/28/22  0146   WBC 8.8  --   --   --    HGB 13.9   < > 12.6 12.9   HCT 42.6   < > 38.6 40.1   .4*  --   -- --      --   --   --     < > = values in this interval not displayed. BMP:    Recent Labs     06/27/22  1542      K 5.2*   CO2 12*   BUN 55*   CREATININE 3.2*     LIVR:   Recent Labs     06/27/22  1542   AST 55*   ALT 39     PT/INR:   Recent Labs     06/27/22  1542   PROT 6.6   INR 1.13     APTT: No results for input(s): APTT in the last 72 hours. ABG: No results for input(s): PHART, WHV2UGT, PO2ART in the last 72 hours.   Consults (if GI or Nephrology- which group?)- no    Electronically signed by Earlene Rodrigues RN on 6/29/2022 at 6:45 AM

## 2022-06-30 LAB
BLOOD BANK DISPENSE STATUS: NORMAL
BLOOD BANK PRODUCT CODE: NORMAL
BPU ID: NORMAL
DESCRIPTION BLOOD BANK: NORMAL

## 2022-06-30 NOTE — DISCHARGE SUMMARY
[facility] Medicine  Death/Discharge Summary    Mayra Kumar       :  1960              MRN:  [id number]    Admit date:  2022    Discharge date:  2022    Admitting Physician:  Shane Dodge MD  Discharge Physician: Michelle Mendoza. Eileen Diego MD          Admission Condition:  critical    Discharged Condition:      History of Present Illness: 62yo WF with PMHX sig for peptic ulcer disease, hx of C diff colitis (currently NO diarrhea), DMII, hx of subtotal colectomy, morbid obesity with BMI of 45.8 presents with vomiting and pt found in pool of coffee-ground emesis and was minimally responsive.  Pt was hypotensive and tachycardic on arrival to ED. Brooke Camara was tachy at 110 and initial SBP in the 70s and minimally responsive to pain.  Pt was give 2u pRBC, 5L fluid bolus total and R IJ central line placed.  Pt does have L chest port as well. CT abd shows likely blood in gastric antrum.  Pt did have subtotal colectomy in  and  had abd surgery for lysis of adhesions.  PT did become more alert after fluid resuscitation and was clear and alert and oriented x 3 at time of my conversation which was witnessed by her brother.  She desires no further interventions. Daughter states she is ready to die and has suffered most of her life. Pt did meet criteria for sepsis POA based on lactic acidosis, acute renal failure, elevated procalcitonin, hypotension, tachycardia, fever, tachypnea. Source:  [facility]. Pt  at . Discharge Physical Exam:    Nurse saw that patient had become asystole on telemetry. Patient seen and examined. No palpable pulse. Pupils fixed and dilated. No cardiac or pulmonary activity. Patient pronounced dead.      Time of Death:     Cause of Death: sepsis, shock, GI bleed        Disposition:   [facility]    Time spent on Discharged < 30 minutes      Signed:  Javier Minaya MD, MD  2022, 7:15 AM

## 2022-06-30 NOTE — PROGRESS NOTES
[facility] notified,  notified. Family left the bedside. Post modern care completed. Security notified.

## 2022-06-30 NOTE — DISCHARGE INSTR - COC
Continuity of Care Form    Patient Name: Aleida Alvarado   :  1960  MRN:  [id number]    Admit date:  2022  Discharge date:  ***    Code Status Order: Prior   Advance Directives:      Admitting Physician:  Jeimy Ma MD  PCP: Fran Polo MD    Discharging Nurse: [facility] Unit/Room#: 6849/9420-50  Discharging Unit Phone Number: ***    Emergency Contact:   Extended Emergency Contact Information  Primary Emergency Contact: Carlos Garcia  Address: [place]           [phone]   [address] Phone: [phone]  Relation: Brother/Sister  Secondary Emergency Contact: [facility] Phone: [phone]  Relation: Brother/Sister    Past Surgical History:  Past Surgical History:   Procedure Laterality Date    ABDOMINAL EXPLORATION SURGERY  12    EXPLORATORY LAPAROTOMY LYSIS OF ADHESIONS    ABDOMINAL SURGERY      APPENDECTOMY      CARPAL TUNNEL RELEASE      both hands    CATARACT EXTRACTION W/  INTRAOCULAR LENS IMPLANT  11    right    CATARACT EXTRACTION W/  INTRAOCULAR LENS IMPLANT  2011    left eye     SECTION      COLONOSCOPY      polyps    COLONOSCOPY  2021    Colon complete; made it to anastomotic site    COLONOSCOPY N/A 3/17/2021    COLON W/ANES. [facility] PT, OWN POA. NEG.  COVID, RESULTS ON CHART performed by Lizandro Riggs MD at [address] [place] [address], [facility]      ENDOSCOPY, COLON, DIAGNOSTIC      EYE SURGERY      laser, vitrectomy ou, cataract ou    FRACTURE SURGERY      HYSTERECTOMY      partial    JOINT REPLACEMENT      both knees    OTHER SURGICAL HISTORY  8/15/11    Left Shoulder Decompression    OTHER SURGICAL HISTORY      PORT REMOVAL (N/A Chest    PORT SURGERY N/A 12/3/2020    PORT REMOVAL performed by Graciela Phelan MD at [facility] Left 2020    PORT INSERTION performed by Graciela Phelan MD at [facility]      right     SUBTOTAL COLECTOMY  6/14/2000    TONSILLECTOMY      TUNNELED CENTRAL VENOUS CATHETER W/ SUBCUTANEOUS PORT Left 12/31/2020    TUNNELED VENOUS PORT PLACEMENT  8/12/11    TUNNELED VENOUS PORT PLACEMENT  3/1/13    Devonte Murillo History: There is no immunization history for the selected administration types on file for this patient. Active Problems:  Patient Active Problem List   Diagnosis Code    Diabetes mellitus ([facility] E11.9    Essential hypertension I10    Vomiting R11.10    Morbid obesity due to excess calories ([facility]) E66.01    Chronic pain associated with significant psychosocial dysfunction G89.4    SBO (small bowel obstruction) ([facility] T64.812    Metabolic encephalopathy Y81.61    Chronic kidney disease, stage 3, mod decreased GFR ([facility]) N18.30    HLD (hyperlipidemia) E78.5    Contusion of toe, right S90.121A    Degenerative arthritis of thumb M18.10    Tendinitis of left wrist M77.8    Hand pain M79.643    Heel pain, chronic M79.673, G89.29    Pain in joint, lower leg M25.569    Knee joint replacement status Z96.659    Contusion of knee S80.00XA    Plantar fasciitis, bilateral M72.2    Bilateral chronic knee pain M25.561, M25.562, G89.29    Back pain M54.9    Lumbar sprain S33. 5XXA    Knee MCL sprain S83.419A    DDD (degenerative disc disease), lumbar M51.36    Acute pain of left shoulder M25.512    Right wrist pain M25.531    Right wrist sprain S63.501A    Rotator cuff strain S46.019A    Contusion of left shoulder S40.012A    Pain in scapula M89.8X1    Acute pain of right shoulder M25.511    DKA (diabetic ketoacidoses) E11.10    THEODORE (acute kidney injury) ([facility] N17.9    NSTEMI (non-ST elevated myocardial infarction) ([facility] I21.4    Diabetic ketoacidosis with coma ([facility]) E11.11    Non-traumatic rhabdomyolysis M62.82    Cervical pain M54.2    Closed avulsion fracture of lateral malleolus of left fibula S82. 62XA    Pain of right thumb M79.644    Acute left ankle pain M25.572    Gamekeeper's thumb of in the 24 hours ending 22 0822  No intake/output data recorded.     Safety Concerns:     [address] [facility] Safety Concerns:434423431}    Impairments/Disabilities:      [address] [facility] Impairments/Disabilities:390092120}    Nutrition Therapy:  Current Nutrition Therapy:   [address] Diet List:748956615}    Routes of Feeding: {CHP DME Other Feedings:287861820}  Liquids: {Slp liquid thickness:26610}  Daily Fluid Restriction: {CHP DME Yes amt example:606072886}  Last Modified Barium Swallow with Video (Video Swallowing Test): {Done Not Done GLAF:905541956}    Treatments at the Time of Hospital Discharge:   Respiratory Treatments: ***  Oxygen Therapy:  {Therapy; copd oxygen:37355}  Ventilator:    {MH CC Vent GKGI:628430436}    Rehab Therapies: {THERAPEUTIC INTERVENTION:4368132436}  Weight Bearing Status/Restrictions: [facility]  Weight Bearin}  Other Medical Equipment (for information only, NOT a DME order):  {EQUIPMENT:328334503}  Other Treatments: ***    Patient's personal belongings (please select all that are sent with patient):  {CHP DME Belongings:729715922}    RN SIGNATURE:  {Esignature:257590485}    CASE MANAGEMENT/SOCIAL WORK SECTION    Inpatient Status Date: ***    Readmission Risk Assessment Score:  Readmission Risk              Risk of Unplanned Readmission:  0           Discharging to Facility/ Agency   Name:   Address:  Phone:  Fax:    Dialysis Facility (if applicable)   Name:  Address:  Dialysis Schedule:  Phone:  Fax:    / signature: {Esignature:726715476}    PHYSICIAN SECTION    Prognosis: {Prognosis:0457854087}    Condition at Discharge: [address] Patient Condition:158786046}    Rehab Potential (if transferring to Rehab): {Prognosis:6336948744}    Recommended Labs or Other Treatments After Discharge: ***    Physician Certification: I certify the above information and transfer of Leandro Camargo  is necessary for the continuing treatment of the diagnosis listed and that she requires {Admit to Appropriate Level of Care:71499} for {GREATER/LESS:290664461} 30 days.      Update Admission H&P: {CHP DME Changes in IMYMI:157372687}    PHYSICIAN SIGNATURE:  {Esignature:291409454}

## 2022-07-01 LAB
BLOOD CULTURE, ROUTINE: NORMAL
CULTURE, BLOOD 2: ABNORMAL
CULTURE, BLOOD 2: ABNORMAL
ORGANISM: ABNORMAL
ORGANISM: ABNORMAL

## 2023-12-01 NOTE — ED NOTES
1 unit Emergent trauma blood infusing via accessed port at 500 ml/hr per Dr. Greg Woo verbal order.      Asia Conde RN  06/27/22 7143 

## (undated) DEVICE — AGENT HEMSTAT W2XL4IN OXIDIZED REGENERATED CELOS ABSRB

## (undated) DEVICE — MAJOR SET UP PK

## (undated) DEVICE — APPLICATOR PREP 26ML 0.7% IOD POVACRYLEX 74% ISO ALC ST

## (undated) DEVICE — GOWN SIRUS NONREIN LG W/TWL: Brand: MEDLINE INDUSTRIES, INC.

## (undated) DEVICE — SURGICAL PROCEDURE PACK IV U-BAR

## (undated) DEVICE — TIP SUCT DIA12FR W STYL CTRL VENT DISPOSABLE FRAZ

## (undated) DEVICE — ELECTRODE,RADIOTRANSLUCENT,FOAM,3PK: Brand: MEDLINE

## (undated) DEVICE — DRAPE C ARM UNIV W41XL74IN CLR PLAS XR VELC CLSR POLY STRP

## (undated) DEVICE — 3M™ STERI-STRIP™ COMPOUND BENZOIN TINCTURE 40 BAGS/CARTON 4 CARTONS/CASE C1544: Brand: 3M™ STERI-STRIP™

## (undated) DEVICE — NEEDLE HYPO 25GA L1.5IN BLU POLYPR HUB S STL REG BVL STR

## (undated) DEVICE — GLOVE ORANGE PI 7 1/2   MSG9075

## (undated) DEVICE — SUTURE VCRL SZ 4-0 L18IN ABSRB UD L19MM PS-2 3/8 CIR PRIM J496H

## (undated) DEVICE — ELECTRODE PT RET AD L9FT HI MOIST COND ADH HYDRGEL CORDED

## (undated) DEVICE — GAUZE,SPONGE,4"X4",8PLY,STRL,LF,10/TRAY: Brand: MEDLINE

## (undated) DEVICE — CANNULA NSL O2 AD 7 FT TBNG SFT TCH STD CONN N FLARED PRNG

## (undated) DEVICE — SUTURE VCRL SZ 3-0 L18IN ABSRB UD L26MM SH 1/2 CIR J864D

## (undated) DEVICE — PACK,UNIVERSAL,SPLIT,II,AURORA: Brand: MEDLINE

## (undated) DEVICE — SOLUTION IV IRRIG 500ML 0.9% SODIUM CHL 2F7123

## (undated) DEVICE — CANNULA NSL 13FT TUBE AD ETCO2 DIV SAMP M

## (undated) DEVICE — SYRINGE MED 10ML LUERLOCK TIP W/O SFTY DISP

## (undated) DEVICE — 3M™ STERI-STRIP™ REINFORCED ADHESIVE SKIN CLOSURES, R1540, 1/8 IN X 3 IN (3 MM X 75 MM), 5 STRIPS/ENVELOPE: Brand: 3M™ STERI-STRIP™

## (undated) DEVICE — 3M™ TEGADERM™ TRANSPARENT FILM DRESSING FRAME STYLE, 1626W, 4 IN X 4-3/4 IN (10 CM X 12 CM), 50/CT 4CT/CASE: Brand: 3M™ TEGADERM™

## (undated) DEVICE — ENDO CARRY-ON PROCEDURE KIT INCLUDES SUCTION TUBING, LUBRICANT, GAUZE, BIOHAZARD STICKER, TRANSPORT PAD AND INTERCEPT BEDSIDE KIT.: Brand: ENDO CARRY-ON PROCEDURE KIT